# Patient Record
Sex: MALE | Race: WHITE | NOT HISPANIC OR LATINO | Employment: UNEMPLOYED | URBAN - METROPOLITAN AREA
[De-identification: names, ages, dates, MRNs, and addresses within clinical notes are randomized per-mention and may not be internally consistent; named-entity substitution may affect disease eponyms.]

---

## 2018-11-09 ENCOUNTER — TRANSCRIBE ORDERS (OUTPATIENT)
Dept: LAB | Facility: HOSPITAL | Age: 43
End: 2018-11-09

## 2018-11-09 ENCOUNTER — APPOINTMENT (OUTPATIENT)
Dept: LAB | Facility: HOSPITAL | Age: 43
End: 2018-11-09
Payer: MEDICARE

## 2018-11-09 DIAGNOSIS — F63.81 INTERMITTENT EXPLOSIVE DISORDER: ICD-10-CM

## 2018-11-09 DIAGNOSIS — F41.1 GENERALIZED ANXIETY DISORDER: ICD-10-CM

## 2018-11-09 DIAGNOSIS — F70 MILD INTELLECTUAL DISABILITIES: ICD-10-CM

## 2018-11-09 DIAGNOSIS — E66.9 OBESITY, UNSPECIFIED CLASSIFICATION, UNSPECIFIED OBESITY TYPE, UNSPECIFIED WHETHER SERIOUS COMORBIDITY PRESENT: ICD-10-CM

## 2018-11-09 DIAGNOSIS — Z79.899 NEED FOR PROPHYLACTIC CHEMOTHERAPY: ICD-10-CM

## 2018-11-09 DIAGNOSIS — Z79.899 NEED FOR PROPHYLACTIC CHEMOTHERAPY: Primary | ICD-10-CM

## 2018-11-09 LAB
25(OH)D3 SERPL-MCNC: 18.3 NG/ML (ref 30–100)
ALBUMIN SERPL BCP-MCNC: 4 G/DL (ref 3.5–5)
ALP SERPL-CCNC: 96 U/L (ref 46–116)
ALT SERPL W P-5'-P-CCNC: 59 U/L (ref 12–78)
ANION GAP SERPL CALCULATED.3IONS-SCNC: 5 MMOL/L (ref 4–13)
AST SERPL W P-5'-P-CCNC: 35 U/L (ref 5–45)
BASOPHILS # BLD AUTO: 0.05 THOUSANDS/ΜL (ref 0–0.1)
BASOPHILS NFR BLD AUTO: 1 % (ref 0–1)
BILIRUB SERPL-MCNC: 0.47 MG/DL (ref 0.2–1)
BUN SERPL-MCNC: 15 MG/DL (ref 5–25)
CALCIUM SERPL-MCNC: 9.4 MG/DL (ref 8.3–10.1)
CHLORIDE SERPL-SCNC: 106 MMOL/L (ref 100–108)
CHOLEST SERPL-MCNC: 262 MG/DL (ref 50–200)
CO2 SERPL-SCNC: 27 MMOL/L (ref 21–32)
CREAT SERPL-MCNC: 1.11 MG/DL (ref 0.6–1.3)
EOSINOPHIL # BLD AUTO: 0.66 THOUSAND/ΜL (ref 0–0.61)
EOSINOPHIL NFR BLD AUTO: 11 % (ref 0–6)
ERYTHROCYTE [DISTWIDTH] IN BLOOD BY AUTOMATED COUNT: 12.7 % (ref 11.6–15.1)
EST. AVERAGE GLUCOSE BLD GHB EST-MCNC: 131 MG/DL
GFR SERPL CREATININE-BSD FRML MDRD: 81 ML/MIN/1.73SQ M
GLUCOSE P FAST SERPL-MCNC: 117 MG/DL (ref 65–99)
HBA1C MFR BLD: 6.2 % (ref 4.2–6.3)
HCT VFR BLD AUTO: 47 % (ref 36.5–49.3)
HDLC SERPL-MCNC: 31 MG/DL (ref 40–60)
HGB BLD-MCNC: 15.5 G/DL (ref 12–17)
IMM GRANULOCYTES # BLD AUTO: 0.01 THOUSAND/UL (ref 0–0.2)
IMM GRANULOCYTES NFR BLD AUTO: 0 % (ref 0–2)
LDLC SERPL CALC-MCNC: 191 MG/DL (ref 0–100)
LYMPHOCYTES # BLD AUTO: 1.32 THOUSANDS/ΜL (ref 0.6–4.47)
LYMPHOCYTES NFR BLD AUTO: 21 % (ref 14–44)
MCH RBC QN AUTO: 29.6 PG (ref 26.8–34.3)
MCHC RBC AUTO-ENTMCNC: 33 G/DL (ref 31.4–37.4)
MCV RBC AUTO: 90 FL (ref 82–98)
MONOCYTES # BLD AUTO: 0.5 THOUSAND/ΜL (ref 0.17–1.22)
MONOCYTES NFR BLD AUTO: 8 % (ref 4–12)
NEUTROPHILS # BLD AUTO: 3.74 THOUSANDS/ΜL (ref 1.85–7.62)
NEUTS SEG NFR BLD AUTO: 59 % (ref 43–75)
NONHDLC SERPL-MCNC: 231 MG/DL
NRBC BLD AUTO-RTO: 0 /100 WBCS
PLATELET # BLD AUTO: 178 THOUSANDS/UL (ref 149–390)
PMV BLD AUTO: 11 FL (ref 8.9–12.7)
POTASSIUM SERPL-SCNC: 4.6 MMOL/L (ref 3.5–5.3)
PROT SERPL-MCNC: 7.8 G/DL (ref 6.4–8.2)
RBC # BLD AUTO: 5.24 MILLION/UL (ref 3.88–5.62)
SODIUM SERPL-SCNC: 138 MMOL/L (ref 136–145)
TRIGL SERPL-MCNC: 201 MG/DL
TSH SERPL DL<=0.05 MIU/L-ACNC: 2.95 UIU/ML (ref 0.36–3.74)
WBC # BLD AUTO: 6.28 THOUSAND/UL (ref 4.31–10.16)

## 2018-11-09 PROCEDURE — 80307 DRUG TEST PRSMV CHEM ANLYZR: CPT

## 2018-11-09 PROCEDURE — 83036 HEMOGLOBIN GLYCOSYLATED A1C: CPT

## 2018-11-09 PROCEDURE — 80053 COMPREHEN METABOLIC PANEL: CPT

## 2018-11-09 PROCEDURE — 80061 LIPID PANEL: CPT

## 2018-11-09 PROCEDURE — 85025 COMPLETE CBC W/AUTO DIFF WBC: CPT

## 2018-11-09 PROCEDURE — 82306 VITAMIN D 25 HYDROXY: CPT

## 2018-11-09 PROCEDURE — 36415 COLL VENOUS BLD VENIPUNCTURE: CPT

## 2018-11-09 PROCEDURE — 84443 ASSAY THYROID STIM HORMONE: CPT

## 2018-11-12 LAB
AMPHETAMINES UR QL SCN: NEGATIVE NG/ML
BARBITURATES UR QL SCN: NEGATIVE NG/ML
BENZODIAZ UR QL SCN: NEGATIVE NG/ML
BZE UR QL: NEGATIVE NG/ML
CANNABINOIDS UR QL SCN: NEGATIVE NG/ML
METHADONE UR QL SCN: NEGATIVE NG/ML
OPIATES UR QL: NEGATIVE NG/ML
PCP UR QL: NEGATIVE NG/ML
PROPOXYPH UR QL: NEGATIVE NG/ML

## 2019-12-18 ENCOUNTER — TRANSCRIBE ORDERS (OUTPATIENT)
Dept: LAB | Facility: HOSPITAL | Age: 44
End: 2019-12-18

## 2019-12-18 ENCOUNTER — LAB (OUTPATIENT)
Dept: LAB | Facility: HOSPITAL | Age: 44
End: 2019-12-18
Payer: MEDICARE

## 2019-12-18 DIAGNOSIS — E78.5 HYPERLIPIDEMIA, UNSPECIFIED HYPERLIPIDEMIA TYPE: ICD-10-CM

## 2019-12-18 DIAGNOSIS — Z79.899 ENCOUNTER FOR LONG-TERM (CURRENT) USE OF OTHER MEDICATIONS: ICD-10-CM

## 2019-12-18 DIAGNOSIS — R73.09 IMPAIRED GLUCOSE TOLERANCE TEST: ICD-10-CM

## 2019-12-18 DIAGNOSIS — E55.9 AVITAMINOSIS D: ICD-10-CM

## 2019-12-18 DIAGNOSIS — E66.9 OBESITY, UNSPECIFIED CLASSIFICATION, UNSPECIFIED OBESITY TYPE, UNSPECIFIED WHETHER SERIOUS COMORBIDITY PRESENT: ICD-10-CM

## 2019-12-18 DIAGNOSIS — E55.9 AVITAMINOSIS D: Primary | ICD-10-CM

## 2019-12-18 LAB
25(OH)D3 SERPL-MCNC: 21.3 NG/ML (ref 30–100)
ALBUMIN SERPL BCP-MCNC: 3.9 G/DL (ref 3.5–5)
ALP SERPL-CCNC: 85 U/L (ref 46–116)
ALT SERPL W P-5'-P-CCNC: 74 U/L (ref 12–78)
ANION GAP SERPL CALCULATED.3IONS-SCNC: 5 MMOL/L (ref 4–13)
AST SERPL W P-5'-P-CCNC: 42 U/L (ref 5–45)
BASOPHILS # BLD AUTO: 0.06 THOUSANDS/ΜL (ref 0–0.1)
BASOPHILS NFR BLD AUTO: 1 % (ref 0–1)
BILIRUB SERPL-MCNC: 0.56 MG/DL (ref 0.2–1)
BUN SERPL-MCNC: 16 MG/DL (ref 5–25)
CALCIUM SERPL-MCNC: 9.4 MG/DL (ref 8.3–10.1)
CHLORIDE SERPL-SCNC: 109 MMOL/L (ref 100–108)
CHOLEST SERPL-MCNC: 246 MG/DL (ref 50–200)
CO2 SERPL-SCNC: 27 MMOL/L (ref 21–32)
CREAT SERPL-MCNC: 1.08 MG/DL (ref 0.6–1.3)
EOSINOPHIL # BLD AUTO: 0.44 THOUSAND/ΜL (ref 0–0.61)
EOSINOPHIL NFR BLD AUTO: 6 % (ref 0–6)
ERYTHROCYTE [DISTWIDTH] IN BLOOD BY AUTOMATED COUNT: 12.9 % (ref 11.6–15.1)
EST. AVERAGE GLUCOSE BLD GHB EST-MCNC: 126 MG/DL
GFR SERPL CREATININE-BSD FRML MDRD: 84 ML/MIN/1.73SQ M
GLUCOSE P FAST SERPL-MCNC: 119 MG/DL (ref 65–99)
HBA1C MFR BLD: 6 % (ref 4.2–6.3)
HCT VFR BLD AUTO: 49.5 % (ref 36.5–49.3)
HDLC SERPL-MCNC: 33 MG/DL
HGB BLD-MCNC: 16.2 G/DL (ref 12–17)
IMM GRANULOCYTES # BLD AUTO: 0.03 THOUSAND/UL (ref 0–0.2)
IMM GRANULOCYTES NFR BLD AUTO: 0 % (ref 0–2)
LDLC SERPL CALC-MCNC: 172 MG/DL (ref 0–100)
LYMPHOCYTES # BLD AUTO: 1.35 THOUSANDS/ΜL (ref 0.6–4.47)
LYMPHOCYTES NFR BLD AUTO: 18 % (ref 14–44)
MCH RBC QN AUTO: 29.5 PG (ref 26.8–34.3)
MCHC RBC AUTO-ENTMCNC: 32.7 G/DL (ref 31.4–37.4)
MCV RBC AUTO: 90 FL (ref 82–98)
MONOCYTES # BLD AUTO: 0.67 THOUSAND/ΜL (ref 0.17–1.22)
MONOCYTES NFR BLD AUTO: 9 % (ref 4–12)
NEUTROPHILS # BLD AUTO: 4.86 THOUSANDS/ΜL (ref 1.85–7.62)
NEUTS SEG NFR BLD AUTO: 66 % (ref 43–75)
NONHDLC SERPL-MCNC: 213 MG/DL
NRBC BLD AUTO-RTO: 0 /100 WBCS
PLATELET # BLD AUTO: 198 THOUSANDS/UL (ref 149–390)
PMV BLD AUTO: 11 FL (ref 8.9–12.7)
POTASSIUM SERPL-SCNC: 4.4 MMOL/L (ref 3.5–5.3)
PROT SERPL-MCNC: 7.7 G/DL (ref 6.4–8.2)
RBC # BLD AUTO: 5.5 MILLION/UL (ref 3.88–5.62)
SODIUM SERPL-SCNC: 141 MMOL/L (ref 136–145)
TRIGL SERPL-MCNC: 207 MG/DL
TSH SERPL DL<=0.05 MIU/L-ACNC: 3.96 UIU/ML (ref 0.36–3.74)
WBC # BLD AUTO: 7.41 THOUSAND/UL (ref 4.31–10.16)

## 2019-12-18 PROCEDURE — 85025 COMPLETE CBC W/AUTO DIFF WBC: CPT

## 2019-12-18 PROCEDURE — 36415 COLL VENOUS BLD VENIPUNCTURE: CPT

## 2019-12-18 PROCEDURE — 84443 ASSAY THYROID STIM HORMONE: CPT

## 2019-12-18 PROCEDURE — 80053 COMPREHEN METABOLIC PANEL: CPT

## 2019-12-18 PROCEDURE — 80061 LIPID PANEL: CPT

## 2019-12-18 PROCEDURE — 83036 HEMOGLOBIN GLYCOSYLATED A1C: CPT

## 2019-12-18 PROCEDURE — 82306 VITAMIN D 25 HYDROXY: CPT

## 2022-02-24 ENCOUNTER — HOSPITAL ENCOUNTER (INPATIENT)
Facility: HOSPITAL | Age: 47
LOS: 1 days | Discharge: HOME | DRG: 309 | End: 2022-02-25
Attending: EMERGENCY MEDICINE | Admitting: INTERNAL MEDICINE
Payer: MEDICARE

## 2022-02-24 ENCOUNTER — APPOINTMENT (EMERGENCY)
Dept: RADIOLOGY | Facility: HOSPITAL | Age: 47
DRG: 309 | End: 2022-02-24
Payer: MEDICARE

## 2022-02-24 DIAGNOSIS — I48.91 A-FIB (HCC): Primary | ICD-10-CM

## 2022-02-24 DIAGNOSIS — J45.909 ASTHMA: ICD-10-CM

## 2022-02-24 DIAGNOSIS — E11.9 TYPE 2 DIABETES MELLITUS, WITHOUT LONG-TERM CURRENT USE OF INSULIN (HCC): ICD-10-CM

## 2022-02-24 LAB
2HR DELTA HS TROPONIN: 0 NG/L
ALBUMIN SERPL BCP-MCNC: 4 G/DL (ref 3.5–5)
ALP SERPL-CCNC: 108 U/L (ref 46–116)
ALT SERPL W P-5'-P-CCNC: 29 U/L (ref 12–78)
ANION GAP SERPL CALCULATED.3IONS-SCNC: 5 MMOL/L (ref 4–13)
AST SERPL W P-5'-P-CCNC: 49 U/L (ref 5–45)
BASOPHILS # BLD AUTO: 0.04 THOUSANDS/ΜL (ref 0–0.1)
BASOPHILS NFR BLD AUTO: 0 % (ref 0–1)
BILIRUB SERPL-MCNC: 1.13 MG/DL (ref 0.2–1)
BUN SERPL-MCNC: 22 MG/DL (ref 5–25)
CALCIUM SERPL-MCNC: 9.3 MG/DL (ref 8.3–10.1)
CARDIAC TROPONIN I PNL SERPL HS: 5 NG/L
CARDIAC TROPONIN I PNL SERPL HS: 5 NG/L
CHLORIDE SERPL-SCNC: 107 MMOL/L (ref 100–108)
CO2 SERPL-SCNC: 27 MMOL/L (ref 21–32)
CREAT SERPL-MCNC: 1.2 MG/DL (ref 0.6–1.3)
D DIMER PPP FEU-MCNC: 2.36 UG/ML FEU
EOSINOPHIL # BLD AUTO: 0.21 THOUSAND/ΜL (ref 0–0.61)
EOSINOPHIL NFR BLD AUTO: 2 % (ref 0–6)
ERYTHROCYTE [DISTWIDTH] IN BLOOD BY AUTOMATED COUNT: 15.5 % (ref 11.6–15.1)
GFR SERPL CREATININE-BSD FRML MDRD: 72 ML/MIN/1.73SQ M
GLUCOSE SERPL-MCNC: 124 MG/DL (ref 65–140)
HCT VFR BLD AUTO: 45.4 % (ref 36.5–49.3)
HGB BLD-MCNC: 14 G/DL (ref 12–17)
IMM GRANULOCYTES # BLD AUTO: 0.03 THOUSAND/UL (ref 0–0.2)
IMM GRANULOCYTES NFR BLD AUTO: 0 % (ref 0–2)
LYMPHOCYTES # BLD AUTO: 1.22 THOUSANDS/ΜL (ref 0.6–4.47)
LYMPHOCYTES NFR BLD AUTO: 14 % (ref 14–44)
MCH RBC QN AUTO: 27.2 PG (ref 26.8–34.3)
MCHC RBC AUTO-ENTMCNC: 30.8 G/DL (ref 31.4–37.4)
MCV RBC AUTO: 88 FL (ref 82–98)
MONOCYTES # BLD AUTO: 0.69 THOUSAND/ΜL (ref 0.17–1.22)
MONOCYTES NFR BLD AUTO: 8 % (ref 4–12)
NEUTROPHILS # BLD AUTO: 6.81 THOUSANDS/ΜL (ref 1.85–7.62)
NEUTS SEG NFR BLD AUTO: 76 % (ref 43–75)
NRBC BLD AUTO-RTO: 0 /100 WBCS
PLATELET # BLD AUTO: 217 THOUSANDS/UL (ref 149–390)
PMV BLD AUTO: 11.7 FL (ref 8.9–12.7)
POTASSIUM SERPL-SCNC: 4.6 MMOL/L (ref 3.5–5.3)
PROT SERPL-MCNC: 8.4 G/DL (ref 6.4–8.2)
RBC # BLD AUTO: 5.15 MILLION/UL (ref 3.88–5.62)
SODIUM SERPL-SCNC: 139 MMOL/L (ref 136–145)
WBC # BLD AUTO: 9 THOUSAND/UL (ref 4.31–10.16)

## 2022-02-24 PROCEDURE — 85025 COMPLETE CBC W/AUTO DIFF WBC: CPT

## 2022-02-24 PROCEDURE — 71275 CT ANGIOGRAPHY CHEST: CPT

## 2022-02-24 PROCEDURE — 36415 COLL VENOUS BLD VENIPUNCTURE: CPT

## 2022-02-24 PROCEDURE — 99291 CRITICAL CARE FIRST HOUR: CPT | Performed by: EMERGENCY MEDICINE

## 2022-02-24 PROCEDURE — 85379 FIBRIN DEGRADATION QUANT: CPT

## 2022-02-24 PROCEDURE — 74177 CT ABD & PELVIS W/CONTRAST: CPT

## 2022-02-24 PROCEDURE — 93005 ELECTROCARDIOGRAM TRACING: CPT

## 2022-02-24 PROCEDURE — 83036 HEMOGLOBIN GLYCOSYLATED A1C: CPT

## 2022-02-24 PROCEDURE — 99285 EMERGENCY DEPT VISIT HI MDM: CPT

## 2022-02-24 PROCEDURE — 80053 COMPREHEN METABOLIC PANEL: CPT

## 2022-02-24 PROCEDURE — 96376 TX/PRO/DX INJ SAME DRUG ADON: CPT

## 2022-02-24 PROCEDURE — 96374 THER/PROPH/DIAG INJ IV PUSH: CPT

## 2022-02-24 PROCEDURE — G1004 CDSM NDSC: HCPCS

## 2022-02-24 PROCEDURE — 84484 ASSAY OF TROPONIN QUANT: CPT

## 2022-02-24 RX ORDER — METOPROLOL TARTRATE 5 MG/5ML
10 INJECTION INTRAVENOUS ONCE
Status: COMPLETED | OUTPATIENT
Start: 2022-02-24 | End: 2022-02-24

## 2022-02-24 RX ORDER — METOPROLOL TARTRATE 5 MG/5ML
5 INJECTION INTRAVENOUS ONCE
Status: COMPLETED | OUTPATIENT
Start: 2022-02-24 | End: 2022-02-24

## 2022-02-24 RX ORDER — METOPROLOL TARTRATE 5 MG/5ML
5 INJECTION INTRAVENOUS ONCE
Status: CANCELLED | OUTPATIENT
Start: 2022-02-24 | End: 2022-02-24

## 2022-02-24 RX ADMIN — METOROPROLOL TARTRATE 10 MG: 5 INJECTION, SOLUTION INTRAVENOUS at 22:49

## 2022-02-24 RX ADMIN — METOROPROLOL TARTRATE 5 MG: 5 INJECTION, SOLUTION INTRAVENOUS at 20:37

## 2022-02-24 RX ADMIN — METOROPROLOL TARTRATE 5 MG: 5 INJECTION, SOLUTION INTRAVENOUS at 21:22

## 2022-02-24 RX ADMIN — IOHEXOL 100 ML: 350 INJECTION, SOLUTION INTRAVENOUS at 22:25

## 2022-02-25 ENCOUNTER — APPOINTMENT (OUTPATIENT)
Dept: NON INVASIVE DIAGNOSTICS | Facility: HOSPITAL | Age: 47
DRG: 309 | End: 2022-02-25
Payer: MEDICARE

## 2022-02-25 ENCOUNTER — EPISODE CHANGES (OUTPATIENT)
Dept: CASE MANAGEMENT | Facility: OTHER | Age: 47
End: 2022-02-25

## 2022-02-25 VITALS
TEMPERATURE: 97.8 F | OXYGEN SATURATION: 92 % | HEART RATE: 102 BPM | BODY MASS INDEX: 36.51 KG/M2 | HEIGHT: 70 IN | SYSTOLIC BLOOD PRESSURE: 127 MMHG | RESPIRATION RATE: 22 BRPM | DIASTOLIC BLOOD PRESSURE: 76 MMHG | WEIGHT: 255 LBS

## 2022-02-25 PROBLEM — R41.89 COGNITIVE DEFICITS: Status: ACTIVE | Noted: 2022-02-25

## 2022-02-25 PROBLEM — K57.30 DIVERTICULOSIS OF COLON WITHOUT DIVERTICULITIS: Status: ACTIVE | Noted: 2022-02-25

## 2022-02-25 PROBLEM — I48.91 ATRIAL FIBRILLATION WITH RAPID VENTRICULAR RESPONSE (HCC): Status: ACTIVE | Noted: 2022-02-25

## 2022-02-25 PROBLEM — R17 TOTAL BILIRUBIN, ELEVATED: Status: ACTIVE | Noted: 2022-02-25

## 2022-02-25 PROBLEM — R06.00 DYSPNEA ON MINIMAL EXERTION: Status: ACTIVE | Noted: 2022-02-25

## 2022-02-25 PROBLEM — R79.89 ELEVATED D-DIMER: Status: ACTIVE | Noted: 2022-02-25

## 2022-02-25 PROBLEM — I10 HYPERTENSION: Status: ACTIVE | Noted: 2022-02-25

## 2022-02-25 PROBLEM — J90 BILATERAL PLEURAL EFFUSION: Status: ACTIVE | Noted: 2022-02-25

## 2022-02-25 PROBLEM — K44.9 HIATAL HERNIA: Status: ACTIVE | Noted: 2022-02-25

## 2022-02-25 PROBLEM — E66.9 OBESITY: Status: ACTIVE | Noted: 2022-02-25

## 2022-02-25 PROBLEM — E11.9 TYPE 2 DIABETES MELLITUS, WITHOUT LONG-TERM CURRENT USE OF INSULIN (HCC): Status: ACTIVE | Noted: 2022-02-25

## 2022-02-25 PROBLEM — R74.8 ELEVATED LIVER ENZYMES: Status: ACTIVE | Noted: 2022-02-25

## 2022-02-25 PROBLEM — J45.909 ASTHMA: Status: ACTIVE | Noted: 2022-02-25

## 2022-02-25 PROBLEM — R06.09 DYSPNEA ON MINIMAL EXERTION: Status: ACTIVE | Noted: 2022-02-25

## 2022-02-25 PROBLEM — I51.7 CARDIOMEGALY: Status: ACTIVE | Noted: 2022-02-25

## 2022-02-25 PROBLEM — T79.2XXA SEROMA DUE TO TRAUMA (HCC): Status: ACTIVE | Noted: 2022-02-25

## 2022-02-25 LAB
ALBUMIN SERPL BCP-MCNC: 4 G/DL (ref 3.5–5)
ALP SERPL-CCNC: 101 U/L (ref 46–116)
ALT SERPL W P-5'-P-CCNC: 24 U/L (ref 12–78)
ANION GAP SERPL CALCULATED.3IONS-SCNC: 7 MMOL/L (ref 4–13)
AORTIC ROOT: 3.5 CM
APICAL FOUR CHAMBER EJECTION FRACTION: 57 %
APTT PPP: 30 SECONDS (ref 23–37)
APTT PPP: 31 SECONDS (ref 23–37)
ASCENDING AORTA: 3.1 CM (ref 2.23–3.35)
AST SERPL W P-5'-P-CCNC: 24 U/L (ref 5–45)
ATRIAL RATE: 441 BPM
BASOPHILS # BLD AUTO: 0.05 THOUSANDS/ΜL (ref 0–0.1)
BASOPHILS NFR BLD AUTO: 1 % (ref 0–1)
BILIRUB DIRECT SERPL-MCNC: 0.33 MG/DL (ref 0–0.2)
BILIRUB SERPL-MCNC: 1.33 MG/DL (ref 0.2–1)
BUN SERPL-MCNC: 19 MG/DL (ref 5–25)
CALCIUM SERPL-MCNC: 9.1 MG/DL (ref 8.3–10.1)
CHLORIDE SERPL-SCNC: 108 MMOL/L (ref 100–108)
CHOLEST SERPL-MCNC: 169 MG/DL
CHOLEST SERPL-MCNC: 175 MG/DL
CO2 SERPL-SCNC: 23 MMOL/L (ref 21–32)
CREAT SERPL-MCNC: 1.17 MG/DL (ref 0.6–1.3)
EOSINOPHIL # BLD AUTO: 0.24 THOUSAND/ΜL (ref 0–0.61)
EOSINOPHIL NFR BLD AUTO: 2 % (ref 0–6)
ERYTHROCYTE [DISTWIDTH] IN BLOOD BY AUTOMATED COUNT: 15.6 % (ref 11.6–15.1)
EST. AVERAGE GLUCOSE BLD GHB EST-MCNC: 143 MG/DL
EST. AVERAGE GLUCOSE BLD GHB EST-MCNC: 146 MG/DL
FLUAV RNA RESP QL NAA+PROBE: NEGATIVE
FLUBV RNA RESP QL NAA+PROBE: NEGATIVE
FRACTIONAL SHORTENING: 33 % (ref 28–44)
GFR SERPL CREATININE-BSD FRML MDRD: 74 ML/MIN/1.73SQ M
GLUCOSE SERPL-MCNC: 147 MG/DL (ref 65–140)
HBA1C MFR BLD: 6.6 %
HBA1C MFR BLD: 6.7 %
HCT VFR BLD AUTO: 44.6 % (ref 36.5–49.3)
HDLC SERPL-MCNC: 21 MG/DL
HDLC SERPL-MCNC: 23 MG/DL
HGB BLD-MCNC: 13.8 G/DL (ref 12–17)
IMM GRANULOCYTES # BLD AUTO: 0.04 THOUSAND/UL (ref 0–0.2)
IMM GRANULOCYTES NFR BLD AUTO: 0 % (ref 0–2)
INR PPP: 1.14 (ref 0.84–1.19)
INTERVENTRICULAR SEPTUM IN DIASTOLE (PARASTERNAL SHORT AXIS VIEW): 0.9 CM (ref 0.58–1.08)
INTERVENTRICULAR SEPTUM: 0.9 CM (ref 0.6–1.1)
LDLC SERPL CALC-MCNC: 123 MG/DL (ref 0–100)
LDLC SERPL CALC-MCNC: 137 MG/DL (ref 0–100)
LEFT ATRIUM SIZE: 3 CM
LEFT INTERNAL DIMENSION IN SYSTOLE: 2.8 CM (ref 5.68–8.61)
LEFT VENTRICULAR INTERNAL DIMENSION IN DIASTOLE: 4.2 CM (ref 9.59–14.3)
LEFT VENTRICULAR POSTERIOR WALL IN END DIASTOLE: 0.9 CM (ref 0.56–1.06)
LEFT VENTRICULAR STROKE VOLUME: 51 ML
LVSV (TEICH): 51 ML
LYMPHOCYTES # BLD AUTO: 1.82 THOUSANDS/ΜL (ref 0.6–4.47)
LYMPHOCYTES NFR BLD AUTO: 18 % (ref 14–44)
MAGNESIUM SERPL-MCNC: 2.3 MG/DL (ref 1.6–2.6)
MCH RBC QN AUTO: 26.8 PG (ref 26.8–34.3)
MCHC RBC AUTO-ENTMCNC: 30.9 G/DL (ref 31.4–37.4)
MCV RBC AUTO: 87 FL (ref 82–98)
MONOCYTES # BLD AUTO: 1.14 THOUSAND/ΜL (ref 0.17–1.22)
MONOCYTES NFR BLD AUTO: 11 % (ref 4–12)
NEUTROPHILS # BLD AUTO: 6.88 THOUSANDS/ΜL (ref 1.85–7.62)
NEUTS SEG NFR BLD AUTO: 68 % (ref 43–75)
NONHDLC SERPL-MCNC: 146 MG/DL
NRBC BLD AUTO-RTO: 0 /100 WBCS
NT-PROBNP SERPL-MCNC: 957 PG/ML
PHOSPHATE SERPL-MCNC: 3.3 MG/DL (ref 2.7–4.5)
PLATELET # BLD AUTO: 207 THOUSANDS/UL (ref 149–390)
PMV BLD AUTO: 11.5 FL (ref 8.9–12.7)
POTASSIUM SERPL-SCNC: 4.5 MMOL/L (ref 3.5–5.3)
PROT SERPL-MCNC: 8.3 G/DL (ref 6.4–8.2)
PROTHROMBIN TIME: 14.2 SECONDS (ref 11.6–14.5)
QRS AXIS: 70 DEGREES
QRSD INTERVAL: 90 MS
QT INTERVAL: 260 MS
QTC INTERVAL: 401 MS
RBC # BLD AUTO: 5.15 MILLION/UL (ref 3.88–5.62)
RSV RNA RESP QL NAA+PROBE: NEGATIVE
SARS-COV-2 RNA RESP QL NAA+PROBE: NEGATIVE
SL CV LV EF: 60
SL CV PED ECHO LEFT VENTRICLE DIASTOLIC VOLUME (MOD BIPLANE) 2D: 79 ML
SL CV PED ECHO LEFT VENTRICLE SYSTOLIC VOLUME (MOD BIPLANE) 2D: 28 ML
SODIUM SERPL-SCNC: 138 MMOL/L (ref 136–145)
T WAVE AXIS: -39 DEGREES
TRIGL SERPL-MCNC: 113 MG/DL
TRIGL SERPL-MCNC: 87 MG/DL
TSH SERPL DL<=0.05 MIU/L-ACNC: 2.8 UIU/ML (ref 0.36–3.74)
VENTRICULAR RATE: 143 BPM
WBC # BLD AUTO: 10.17 THOUSAND/UL (ref 4.31–10.16)
Z-SCORE OF ASCENDING AORTA: 1.1
Z-SCORE OF INTERVENTRICULAR SEPTUM IN END DIASTOLE: 0.57
Z-SCORE OF LEFT VENTRICULAR DIMENSION IN END DIASTOLE: -10.25
Z-SCORE OF LEFT VENTRICULAR DIMENSION IN END SYSTOLE: -7.24
Z-SCORE OF LEFT VENTRICULAR POSTERIOR WALL IN END DIASTOLE: 0.68

## 2022-02-25 PROCEDURE — 80061 LIPID PANEL: CPT

## 2022-02-25 PROCEDURE — 84100 ASSAY OF PHOSPHORUS: CPT

## 2022-02-25 PROCEDURE — 99448 NTRPROF PH1/NTRNET/EHR 21-30: CPT | Performed by: PHYSICIAN ASSISTANT

## 2022-02-25 PROCEDURE — 85730 THROMBOPLASTIN TIME PARTIAL: CPT | Performed by: STUDENT IN AN ORGANIZED HEALTH CARE EDUCATION/TRAINING PROGRAM

## 2022-02-25 PROCEDURE — 80061 LIPID PANEL: CPT | Performed by: INTERNAL MEDICINE

## 2022-02-25 PROCEDURE — 85025 COMPLETE CBC W/AUTO DIFF WBC: CPT

## 2022-02-25 PROCEDURE — 83735 ASSAY OF MAGNESIUM: CPT

## 2022-02-25 PROCEDURE — 0241U HB NFCT DS VIR RESP RNA 4 TRGT: CPT

## 2022-02-25 PROCEDURE — NC001 PR NO CHARGE: Performed by: INTERNAL MEDICINE

## 2022-02-25 PROCEDURE — 99219 PR INITIAL OBSERVATION CARE/DAY 50 MINUTES: CPT | Performed by: INTERNAL MEDICINE

## 2022-02-25 PROCEDURE — 36415 COLL VENOUS BLD VENIPUNCTURE: CPT

## 2022-02-25 PROCEDURE — 84443 ASSAY THYROID STIM HORMONE: CPT

## 2022-02-25 PROCEDURE — C8929 TTE W OR WO FOL WCON,DOPPLER: HCPCS

## 2022-02-25 PROCEDURE — 99284 EMERGENCY DEPT VISIT MOD MDM: CPT | Performed by: INTERNAL MEDICINE

## 2022-02-25 PROCEDURE — 85610 PROTHROMBIN TIME: CPT | Performed by: STUDENT IN AN ORGANIZED HEALTH CARE EDUCATION/TRAINING PROGRAM

## 2022-02-25 PROCEDURE — 93306 TTE W/DOPPLER COMPLETE: CPT | Performed by: INTERNAL MEDICINE

## 2022-02-25 PROCEDURE — 80053 COMPREHEN METABOLIC PANEL: CPT | Performed by: STUDENT IN AN ORGANIZED HEALTH CARE EDUCATION/TRAINING PROGRAM

## 2022-02-25 PROCEDURE — 83880 ASSAY OF NATRIURETIC PEPTIDE: CPT

## 2022-02-25 PROCEDURE — 82248 BILIRUBIN DIRECT: CPT | Performed by: STUDENT IN AN ORGANIZED HEALTH CARE EDUCATION/TRAINING PROGRAM

## 2022-02-25 PROCEDURE — 93010 ELECTROCARDIOGRAM REPORT: CPT | Performed by: INTERNAL MEDICINE

## 2022-02-25 PROCEDURE — 83036 HEMOGLOBIN GLYCOSYLATED A1C: CPT | Performed by: INTERNAL MEDICINE

## 2022-02-25 RX ORDER — DILTIAZEM HYDROCHLORIDE 5 MG/ML
10 INJECTION INTRAVENOUS ONCE
Status: COMPLETED | OUTPATIENT
Start: 2022-02-25 | End: 2022-02-25

## 2022-02-25 RX ORDER — FUROSEMIDE 10 MG/ML
20 INJECTION INTRAMUSCULAR; INTRAVENOUS ONCE
Status: DISCONTINUED | OUTPATIENT
Start: 2022-02-25 | End: 2022-02-25

## 2022-02-25 RX ORDER — METOPROLOL TARTRATE 5 MG/5ML
5 INJECTION INTRAVENOUS EVERY 6 HOURS PRN
Status: DISCONTINUED | OUTPATIENT
Start: 2022-02-25 | End: 2022-02-25 | Stop reason: HOSPADM

## 2022-02-25 RX ORDER — ROSUVASTATIN CALCIUM 10 MG/1
10 TABLET, COATED ORAL DAILY
Qty: 30 TABLET | Refills: 0 | Status: SHIPPED | OUTPATIENT
Start: 2022-02-25 | End: 2022-07-13

## 2022-02-25 RX ORDER — ALBUTEROL SULFATE 1.25 MG/3ML
1.25 SOLUTION RESPIRATORY (INHALATION) EVERY 6 HOURS PRN
COMMUNITY
End: 2022-02-25 | Stop reason: CLARIF

## 2022-02-25 RX ORDER — ACETAMINOPHEN 325 MG/1
650 TABLET ORAL EVERY 6 HOURS PRN
Status: DISCONTINUED | OUTPATIENT
Start: 2022-02-25 | End: 2022-02-25 | Stop reason: HOSPADM

## 2022-02-25 RX ORDER — DILTIAZEM HYDROCHLORIDE 180 MG/1
180 CAPSULE, COATED, EXTENDED RELEASE ORAL DAILY
Status: DISCONTINUED | OUTPATIENT
Start: 2022-02-25 | End: 2022-02-25

## 2022-02-25 RX ORDER — ALBUTEROL SULFATE 90 UG/1
2 AEROSOL, METERED RESPIRATORY (INHALATION) EVERY 4 HOURS PRN
Qty: 8 G | Refills: 0 | Status: SHIPPED | OUTPATIENT
Start: 2022-02-25 | End: 2022-06-15 | Stop reason: SDUPTHER

## 2022-02-25 RX ORDER — METFORMIN HYDROCHLORIDE 500 MG/1
500 TABLET, EXTENDED RELEASE ORAL
Qty: 30 TABLET | Refills: 0 | Status: SHIPPED | OUTPATIENT
Start: 2022-02-25 | End: 2022-06-15 | Stop reason: SDUPTHER

## 2022-02-25 RX ORDER — MAGNESIUM SULFATE HEPTAHYDRATE 40 MG/ML
2 INJECTION, SOLUTION INTRAVENOUS ONCE
Status: COMPLETED | OUTPATIENT
Start: 2022-02-25 | End: 2022-02-25

## 2022-02-25 RX ORDER — ALBUTEROL SULFATE 90 UG/1
2 AEROSOL, METERED RESPIRATORY (INHALATION) EVERY 4 HOURS PRN
Status: DISCONTINUED | OUTPATIENT
Start: 2022-02-25 | End: 2022-02-25 | Stop reason: HOSPADM

## 2022-02-25 RX ORDER — FUROSEMIDE 10 MG/ML
20 INJECTION INTRAMUSCULAR; INTRAVENOUS ONCE
Status: COMPLETED | OUTPATIENT
Start: 2022-02-25 | End: 2022-02-25

## 2022-02-25 RX ORDER — METOPROLOL TARTRATE 50 MG/1
50 TABLET, FILM COATED ORAL EVERY 12 HOURS SCHEDULED
Qty: 60 TABLET | Refills: 0 | Status: SHIPPED | OUTPATIENT
Start: 2022-02-25 | End: 2022-05-18

## 2022-02-25 RX ADMIN — DILTIAZEM HYDROCHLORIDE 10 MG: 5 INJECTION INTRAVENOUS at 01:19

## 2022-02-25 RX ADMIN — METOPROLOL TARTRATE 25 MG: 25 TABLET, FILM COATED ORAL at 13:54

## 2022-02-25 RX ADMIN — DILTIAZEM HYDROCHLORIDE 12.5 MG/HR: 5 INJECTION INTRAVENOUS at 02:25

## 2022-02-25 RX ADMIN — PERFLUTREN 1.2 ML/MIN: 6.52 INJECTION, SUSPENSION INTRAVENOUS at 09:30

## 2022-02-25 RX ADMIN — DILTIAZEM HYDROCHLORIDE 2.5 MG/HR: 5 INJECTION INTRAVENOUS at 01:25

## 2022-02-25 RX ADMIN — FUROSEMIDE 20 MG: 10 INJECTION, SOLUTION INTRAMUSCULAR; INTRAVENOUS at 13:54

## 2022-02-25 RX ADMIN — HEPARIN SODIUM 11.1 UNITS/KG/HR: 10000 INJECTION, SOLUTION INTRAVENOUS; SUBCUTANEOUS at 03:42

## 2022-02-25 RX ADMIN — MAGNESIUM SULFATE HEPTAHYDRATE 2 G: 2 INJECTION, SOLUTION INTRAVENOUS at 01:30

## 2022-02-25 NOTE — ED ATTENDING ATTESTATION
2/24/2022  Juan Bryson DO, saw and evaluated the patient  I have discussed the patient with the resident/non-physician practitioner and agree with the resident's/non-physician practitioner's findings, Plan of Care, and MDM as documented in the resident's/non-physician practitioner's note, except where noted  All available labs and Radiology studies were reviewed  I was present for key portions of any procedure(s) performed by the resident/non-physician practitioner and I was immediately available to provide assistance  At this point I agree with the current assessment done in the Emergency Department  I have conducted an independent evaluation of this patient a history and physical is as follows:    57 yo male presents for evaluation of dyspnea with exertion for some time now  No other a/e factors  Denies chest pain, leg pain or swelling, back pain, abd pain, focal weakness/numbness/tingling  No other c/o at this time  Imp: dyspnea, ddx broad plan: cardiac eval, reassess         ED Course         Critical Care Time  CriticalCare Time  Performed by: Carole Juarez DO  Authorized by: Carole Juarez DO     Critical care provider statement:     Critical care time (minutes):  32    Critical care time was exclusive of:  Separately billable procedures and treating other patients and teaching time    Critical care was necessary to treat or prevent imminent or life-threatening deterioration of the following conditions:  Cardiac failure    Critical care was time spent personally by me on the following activities:  Blood draw for specimens, obtaining history from patient or surrogate, development of treatment plan with patient or surrogate, evaluation of patient's response to treatment, examination of patient, re-evaluation of patient's condition, ordering and review of radiographic studies, ordering and review of laboratory studies and ordering and performing treatments and interventions

## 2022-02-25 NOTE — ASSESSMENT & PLAN NOTE
Patient reports history of asthma   Utilizes albuterol inhaler as needed at home    Plan:  · Continue PRN albuterol inhaler
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
## 2022-02-25 NOTE — ASSESSMENT & PLAN NOTE
Patient found to have diverticulosis without evidence of diverticulitis on CT  Patient is asymptomatic
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
## 2022-02-25 NOTE — ASSESSMENT & PLAN NOTE
AST of 49 and total bilirubin of 1 13 on admission      Plan:  · f/u direct bilirubin   · repeat CMP in AM

## 2022-02-25 NOTE — ASSESSMENT & PLAN NOTE
DDimer noted to be 2 36 on presentation  PE study in the ED was negative      Plan:  · heparin gtt for now until Eliquis is price checked

## 2022-02-25 NOTE — ASSESSMENT & PLAN NOTE
BMI 36 2  Diet consists mainly of burgers  Exercise includes walking 1 5 to 2 miles daily       -  patient on heart healthy diet   - f/u Lipid panel   - f/u HgA1C

## 2022-02-25 NOTE — ASSESSMENT & PLAN NOTE
On CT patient found to have cardiomegaly with outpouching of the apex of the heart  Plan:  · Per radiology, further evaluation with echocardiogram is recommended to evaluate for ventricular aneurysm     · Echo ordered

## 2022-02-25 NOTE — ASSESSMENT & PLAN NOTE
Patient presents with worsening SOB/SCHERER the past 2 days  Albuterol inhaler helps improve SOB  Found to be in afib with RVR on admission  D-dimer 2 36  PE study with CT abd and pelvis with contrast showed no evidence of PE, however did reveal small bilateral pleural effusions with compressive atelectasis, mild pulmonary edema, and cardiomegaly   No signs of volume overload on physical exam      Plan:   · Echo ordered   · F/u COVID/flu/RSV  · Continue home albuterol inhaler prn   · Monitor I/Os  · Daily weights  · F/u BNP  · Consider diuresis with IV lasix
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
## 2022-02-25 NOTE — CONSULTS
Consultation - General Cardiology Team 2  [place] 56 y o  male MRN: [id number]  Unit/Bed#: ED 15 Encounter: [id number]      Inpatient consult to Cardiology  Consult performed by: Rogelio Hicks DO  Consult ordered by: Gorge Sanchez MD        PCP: Quin Ford MD (Inactive)   Outpatient Cardiologist: PRISCA    History of Present Illness   Physician Requesting Consult: Jose Antonio Vernon DO  Reason for Consult / Principal Problem: Afib with RVR    HPI: Heather is a 56y o  year old male with a history of hypertension, asthma, and cognitive deficit who presents 2/25/22 for dyspnea on exertion x2 days  Patient notices the shortness of breath only with exertion, and it resolves within a couple minutes of rest  Patient started to become concerned after discussing with his brother, and presented to the ED  Patient does not follow with a doctor regularly  Denies symptoms of CRUZITO  Denies lightheadedness, syncope, vision changes, chest pain with or without exertion, palpitations, orthopnea, PND, nausea/vomiting, edema  Family history:  - Brother (59 y/o) with afib  - Maternal grandfather passed away from MI  Social history:   - Caffeine- minimal  - Alcohol- none   - Tobacco- no smoking or vaping   - Drug use (Amphetamines/cocaine/methamphetamines)- none   - Physical activity- reports 15-30 minutes of physical activity most days    ED course:   - Vitals on arrival: HR 150s, BP 160s/100s, RR 30s, normal ox on room air  - Electrolytes and thyroid function normal  - D-dimer elevated 2 36  - CTA PE study negative for PE, did show small bilateral pleural effusions and mild pulmonary edema, with concern ventricular aneurysm with cardiomegaly and outpouching of the apex  - s/p Lopressor 5 mg IV x2, Lopressor 10 mg IV x1, Cardizem 10 mg IV x1  - Placed on Cardizem gtt  @ 10/hr and Heparin gtt        Cardiac workup:   - EKG shows AFib with rate 143 (prior NSR in 2013)  - negative troponins  - BNP elevated 957   - TTE showed EF 60%, normal echo    Review of Systems  A full review of systems was conducted and is otherwise negative except as otherwise stated above  Historical Information   Past Medical History:   Diagnosis Date    Hypertension      History reviewed  No pertinent surgical history  Social History     Substance and Sexual Activity   Alcohol Use Never     Social History     Substance and Sexual Activity   Drug Use Not Currently     Social History     Tobacco Use   Smoking Status Never Smoker   Smokeless Tobacco Never Used     Family History: See above    Meds/Allergies   Hospital Medications:   Current Facility-Administered Medications   Medication Dose Route Frequency    acetaminophen (TYLENOL) tablet 650 mg  650 mg Oral Q6H PRN    albuterol (PROVENTIL HFA,VENTOLIN HFA) inhaler 2 puff  2 puff Inhalation Q4H PRN    diltiazem (CARDIZEM) 125 mg in sodium chloride 0 9 % 125 mL infusion  1-15 mg/hr Intravenous Titrated    furosemide (LASIX) injection 20 mg  20 mg Intravenous Once    heparin (porcine) 25,000 Units in sodium chloride 0 45 % 250 mL infusion  3-20 Units/kg/hr (Order-Specific) Intravenous Titrated    metoprolol tartrate (LOPRESSOR) tablet 25 mg  25 mg Oral Q12H [address]     Home Medications: (Not in a hospital admission)      Allergies   Allergen Reactions    Penicillins Other (See Comments)     unknown       Objective   Vitals: Blood pressure 128/85, pulse 104, temperature 97 8 °F (36 6 °C), temperature source Oral, resp  rate (!) 30, height 5' 10" (1 778 m), weight 116 kg (255 lb), SpO2 93 %    Orthostatic Blood Pressures      Most Recent Value   Blood Pressure 128/85 filed at 02/25/2022 1100   Patient Position - Orthostatic VS Lying filed at 02/25/2022 0500            Invasive Devices  Report    Peripheral Intravenous Line            Peripheral IV 02/24/22 Right Antecubital <1 day    Peripheral IV 02/25/22 Left Forearm <1 day                Physical Exam  GEN: [employer] appears well, pleasant and cooperative   HEENT:  Normocephalic, atraumatic, anicteric, moist mucous membranes  NECK: No JVD or carotid bruits   HEART: Irregular rhythm, regular rate, normal S1 and S2, no murmurs, clicks, gallops or rubs   LUNGS: Clear to auscultation bilaterally; no wheezes, rales, or rhonchi; respiration nonlabored   ABDOMEN:  Normoactive bowel sounds, soft, no tenderness, no distention  EXTREMITIES: Lower extremity edema, peripheral pulses palpable  NEURO: no gross focal findings; cranial nerves grossly intact   SKIN:  Dry, intact, warm to touch    Lab Results: I have personally reviewed pertinent lab results  Results from last 7 days   Lab Units 02/25/22  0119   NT-PRO BNP pg/mL 957*     Results from last 7 days   Lab Units 02/25/22  0547 02/24/22 2015   POTASSIUM mmol/L 4 5 4 6   CO2 mmol/L 23 27   CHLORIDE mmol/L 108 107   BUN mg/dL 19 22   CREATININE mg/dL 1 17 1 20     Results from last 7 days   Lab Units 02/25/22  0547 02/24/22 2015   HEMOGLOBIN g/dL 13 8 14 0   HEMATOCRIT % 44 6 45 4   PLATELETS Thousands/uL 207 217     Results from last 7 days   Lab Units 02/25/22  0948 02/25/22  0230   PTT seconds 31 30     Results from last 7 days   Lab Units 02/25/22  0119   HDL mg/dL 23*   LDL CALC mg/dL 123*   TRIGLYCERIDES mg/dL 113         Imaging: I have personally reviewed pertinent reports  Holter/Telemetry:   Reviewed    ECHO: No results found for this or any previous visit  CATH/STRESS TEST:   None to review    EKG: Reviewed, see above      VTE Prophylaxis: Heparin gtt  Assessment/Plan     Assessment:  Atrial fibrillation with RVR      Plan:    1  New onset atrial fibrillation with RVR:  Evidenced on EKG and telemetry with initial heart rates 140-150  Status post multiple pushes of IV Lopressor and subsequently started Cardizem bolus to drip  Currently on heparin gtt  Rates now more controlled 's  CHADS-VASc is 2 (hypertension and diabetes)  Normal electrolytes and thyroid function    No PE on CTA  Normal TTE  No clear acute trigger for AFib, however risk factors include hypertension, diabetes, and obesity    - Given patient's relatively young age and symptomatic afib, discussed options for cardioversion including electrical/pharmacolgoic and need for a JET prior due to onset of AFib > 48 hrs  Patient is not interested in cardioversion at this time and would like to be treated with rate controlling medications at this time  - Discontinue Cardizem gtt  - Increase Lopressor to 50 mg b i d   - AC: Discussed the risks and benefits of starting anticoagulation, patient verbalizes understanding  Plan to transition from heparin gtt  to Eliquis 5 mg b i d    - consider outpatient sleep study to evaluate for CRUZITO  - control risk factors for AFib including diabetes, obesity and hypertension  - follow-up with [facility] office in 1 week    Case discussed and reviewed with Dr Dony Issa who agrees with my assessment and plan  Thank you for involving us in the care of your patient  Christine Benites, DO PGY-1  [facility]       ==========================================================================================    Counseling / Coordination of Care  Total floor / unit time spent today 30 minutes minutes  Greater than 50% of total time was spent with the patient and / or family counseling and / or coordination of care  A description of the counseling / coordination of care  [facility]/Care Everywhere records reviewed  ** Please Note: Fluency DirectDictation voice to text software may have been used in the creation of this document   **

## 2022-02-25 NOTE — ASSESSMENT & PLAN NOTE
Patient reports history of HTN but has not seen a doctor in years and does not take any medications       Plan:  · s/p 20 mg IV metoprolol given in ED  · Given Cardizem 10mg bolus followed by Cardizem gtt to target HR <100 with SBP >100  · Monitor BP
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
## 2022-02-25 NOTE — ED PROVIDER NOTES
History  Chief Complaint   Patient presents with    Weakness - Generalized     x3 days when walking increasing SOB, exhaustion, dizziness; does not see dr regularly; brother is w pt states that pt 'downplays symptoms because he's worried about coming to the drs' and has concerns RE cholesterol, heart failure     69-year-old male patient with history of hypertension, asthma presenting with shortness of breath worsening on exertion x2 days  Patient states that when he walks more than usual, he gets short of breath  Patient states that he has uses albuterol inhaler and improves his symptoms  He denies chest pain, fever, cough, abdominal pain, nausea vomiting diarrhea, urinary symptoms  Patient's brother who brought patient to ED states is concerned as patient does not follow up with any doctors and has a family history of cardiac issues  Denies recent travel history, history of cancer, history of clots  None       Past Medical History:   Diagnosis Date    Hypertension        History reviewed  No pertinent surgical history  History reviewed  No pertinent family history  I have reviewed and agree with the history as documented  E-Cigarette/Vaping     E-Cigarette/Vaping Substances     Social History     Tobacco Use    Smoking status: Never Smoker    Smokeless tobacco: Never Used   Substance Use Topics    Alcohol use: Never    Drug use: Not Currently        Review of Systems   Constitutional: Negative for chills and fever  HENT: Negative for congestion, rhinorrhea and sore throat  Eyes: Negative for pain and visual disturbance  Respiratory: Positive for shortness of breath  Negative for cough and chest tightness  Cardiovascular: Negative for chest pain and leg swelling  Gastrointestinal: Negative for abdominal pain, diarrhea, nausea and vomiting  Genitourinary: Negative for difficulty urinating and dysuria  Musculoskeletal: Negative for arthralgias, back pain and myalgias     Skin: Negative for rash and wound  Neurological: Negative for dizziness and headaches  Psychiatric/Behavioral: The patient is nervous/anxious  All other systems reviewed and are negative  Physical Exam  ED Triage Vitals   Temperature Pulse Respirations Blood Pressure SpO2   02/24/22 1859 02/24/22 1859 02/24/22 1859 02/24/22 1859 02/24/22 1859   97 8 °F (36 6 °C) 86 20 (!) 166/117 94 %      Temp Source Heart Rate Source Patient Position - Orthostatic VS BP Location FiO2 (%)   02/24/22 1859 02/24/22 2240 02/24/22 1859 02/24/22 1859 --   Oral Monitor Sitting Right arm       Pain Score       --                    Orthostatic Vital Signs  Vitals:    02/25/22 1354 02/25/22 1445 02/25/22 1449 02/25/22 1600   BP: 129/73 147/83 147/83 127/76   Pulse: (!) 113 (!) 116 105 102   Patient Position - Orthostatic VS:   Lying        Physical Exam  Vitals reviewed  Constitutional:       Appearance: He is obese  HENT:      Head: Normocephalic and atraumatic  Nose: Nose normal       Mouth/Throat:      Mouth: Mucous membranes are moist       Pharynx: Oropharynx is clear  Eyes:      Extraocular Movements: Extraocular movements intact  Conjunctiva/sclera: Conjunctivae normal    Cardiovascular:      Rate and Rhythm: Tachycardia present  Rhythm irregular  Pulses: Normal pulses  Heart sounds: Normal heart sounds  Pulmonary:      Effort: Pulmonary effort is normal       Breath sounds: Normal breath sounds  Abdominal:      General: Bowel sounds are normal       Palpations: Abdomen is soft  Tenderness: There is no abdominal tenderness  Musculoskeletal:         General: Normal range of motion  Cervical back: Normal range of motion  Skin:     General: Skin is warm and dry  Neurological:      General: No focal deficit present  Mental Status: He is alert and oriented to person, place, and time  Mental status is at baseline           ED Medications  Medications   albuterol (PROVENTIL HFA,VENTOLIN HFA) inhaler 2 puff (has no administration in time range)   acetaminophen (TYLENOL) tablet 650 mg (has no administration in time range)   metoprolol (LOPRESSOR) injection 5 mg (has no administration in time range)   apixaban (ELIQUIS) tablet 5 mg (has no administration in time range)   metoprolol tartrate (LOPRESSOR) tablet 50 mg (has no administration in time range)   metoprolol tartrate (LOPRESSOR) tablet 25 mg (has no administration in time range)   metoprolol (LOPRESSOR) injection 5 mg (5 mg Intravenous Given 2/24/22 2037)   metoprolol (LOPRESSOR) injection 5 mg (5 mg Intravenous Given 2/24/22 2122)   iohexol (OMNIPAQUE) 350 MG/ML injection (SINGLE-DOSE) 100 mL (100 mL Intravenous Given 2/24/22 2225)   metoprolol (LOPRESSOR) injection 10 mg (10 mg Intravenous Given 2/24/22 2249)   diltiazem (CARDIZEM) injection 10 mg (10 mg Intravenous Given 2/25/22 0119)   magnesium sulfate 2 g/50 mL IVPB (premix) 2 g (0 g Intravenous Stopped 2/25/22 0150)   perflutren lipid microsphere (DEFINITY) injection (1 2 mL/min Intravenous Given 2/25/22 0930)   furosemide (LASIX) injection 20 mg (20 mg Intravenous Given 2/25/22 1354)       Diagnostic Studies  Results Reviewed     Procedure Component Value Units Date/Time    APTT six (6) hours after Heparin bolus/drip initiation or dosing change [[id number]]     Lab Status: No result Specimen: Blood     Hemoglobin A1C [[id number]]  (Abnormal) Collected: 02/25/22 0547    Lab Status: Final result Specimen: Blood from Arm, Right Updated: 02/25/22 1320     Hemoglobin A1C 6 7 %       mg/dl     Lipid Panel with Direct LDL reflex [[id number]]  (Abnormal) Collected: 02/25/22 0547    Lab Status: Final result Specimen: Blood from Arm, Right Updated: 02/25/22 1313     Cholesterol 175 mg/dL      Triglycerides 87 mg/dL      HDL, Direct 21 mg/dL      LDL Calculated 137 mg/dL     APTT six (6) hours after Heparin bolus/drip initiation or dosing change [[id number]]  (Normal) Collected: 02/25/22 0948    Lab Status: Final result Specimen: Blood from Arm, Left Updated: 02/25/22 1027     PTT 31 seconds     COVID/FLU/RSV [[id number]]  (Normal) Collected: 02/25/22 0800    Lab Status: Final result Specimen: Nares from Nose Updated: 02/25/22 0905     SARS-CoV-2 Negative     INFLUENZA A PCR Negative     INFLUENZA B PCR Negative     RSV PCR Negative    Narrative:      FOR PEDIATRIC PATIENTS - copy/paste COVID Guidelines URL to browser: [email or web address]    SARS-CoV-2 assay is a Nucleic Acid Amplification assay intended for the  qualitative detection of nucleic acid from SARS-CoV-2 in nasopharyngeal  swabs  Results are for the presumptive identification of SARS-CoV-2 RNA  Positive results are indicative of infection with SARS-CoV-2, the virus  causing COVID-19, but do not rule out bacterial infection or co-infection  with other viruses  Laboratories within the [place] and its  territories are required to report all positive results to the appropriate  public health authorities  Negative results do not preclude SARS-CoV-2  infection and should not be used as the sole basis for treatment or other  patient management decisions  Negative results must be combined with  clinical observations, patient history, and epidemiological information  This test has not been FDA cleared or approved  This test has been authorized by FDA under an Emergency Use Authorization  (EUA)  This test is only authorized for the duration of time the  declaration that circumstances exist justifying the authorization of the  emergency use of an in vitro diagnostic tests for detection of SARS-CoV-2  virus and/or diagnosis of COVID-19 infection under section 564(b)(1) of  the Act, 21 U  S C  604GES-5(X)(1), unless the authorization is terminated  or revoked sooner  The test has been validated but independent review by FDA  and CLIA is pending      Test performed using [employer] GeneXpert: This RT-PCR assay targets N2,  a region unique to SARS-CoV-2  A conserved region in the E-gene was chosen  for pan-Sarbecovirus detection which includes SARS-CoV-2      APTT six (6) hours after Heparin bolus/drip initiation or dosing change [[id number]]     Lab Status: No result Specimen: Blood     Comprehensive metabolic panel [[id number]]  (Abnormal) Collected: 02/25/22 0547    Lab Status: Final result Specimen: Blood from Arm, Right Updated: 02/25/22 0624     Sodium 138 mmol/L      Potassium 4 5 mmol/L      Chloride 108 mmol/L      CO2 23 mmol/L      ANION GAP 7 mmol/L      BUN 19 mg/dL      Creatinine 1 17 mg/dL      Glucose 147 mg/dL      Calcium 9 1 mg/dL      AST 24 U/L      ALT 24 U/L      Alkaline Phosphatase 101 U/L      Total Protein 8 3 g/dL      Albumin 4 0 g/dL      Total Bilirubin 1 33 mg/dL      eGFR 74 ml/min/1 73sq m     Narrative:      [facility] guidelines for Chronic Kidney Disease (CKD):     Stage 1 with normal or high GFR (GFR > 90 mL/min/1 73 square meters)    Stage 2 Mild CKD (GFR = 60-89 mL/min/1 73 square meters)    Stage 3A Moderate CKD (GFR = 45-59 mL/min/1 73 square meters)    Stage 3B Moderate CKD (GFR = 30-44 mL/min/1 73 square meters)    Stage 4 Severe CKD (GFR = 15-29 mL/min/1 73 square meters)    Stage 5 End Stage CKD (GFR <15 mL/min/1 73 square meters)  Note: GFR calculation is accurate only with a steady state creatinine    CBC and differential [[id number]]  (Abnormal) Collected: 02/25/22 0547    Lab Status: Final result Specimen: Blood from Arm, Right Updated: 02/25/22 0602     WBC 10 17 Thousand/uL      RBC 5 15 Million/uL      Hemoglobin 13 8 g/dL      Hematocrit 44 6 %      MCV 87 fL      MCH 26 8 pg      MCHC 30 9 g/dL      RDW 15 6 %      MPV 11 5 fL      Platelets 662 Thousands/uL      nRBC 0 /100 WBCs      Neutrophils Relative 68 %      Immat GRANS % 0 %      Lymphocytes Relative 18 %      Monocytes Relative 11 %      Eosinophils Relative 2 %      Basophils Relative 1 %      Neutrophils Absolute 6 88 Thousands/µL      Immature Grans Absolute 0 04 Thousand/uL      Lymphocytes Absolute 1 82 Thousands/µL      Monocytes Absolute 1 14 Thousand/µL      Eosinophils Absolute 0 24 Thousand/µL      Basophils Absolute 0 05 Thousands/µL     Magnesium [[id number]]  (Normal) Collected: 02/25/22 0119    Lab Status: Final result Specimen: Blood from Arm, Right Updated: 02/25/22 0534     Magnesium 2 3 mg/dL     Phosphorus [[id number]]  (Normal) Collected: 02/25/22 0119    Lab Status: Final result Specimen: Blood from Arm, Right Updated: 02/25/22 0534     Phosphorus 3 3 mg/dL     Bilirubin, direct [[id number]]  (Abnormal) Collected: 02/25/22 0119    Lab Status: Final result Specimen: Blood from Arm, Right Updated: 02/25/22 0534     Bilirubin, Direct 0 33 mg/dL     Protime-INR [[id number]]  (Normal) Collected: 02/25/22 0230    Lab Status: Final result Specimen: Blood from Arm, Right Updated: 02/25/22 0259     Protime 14 2 seconds      INR 1 14    APTT [[id number]]  (Normal) Collected: 02/25/22 0230    Lab Status: Final result Specimen: Blood from Arm, Right Updated: 02/25/22 0259     PTT 30 seconds     Hemoglobin A1C [[id number]]  (Abnormal) Collected: 02/24/22 2015    Lab Status: Final result Specimen: Blood from Arm, Left Updated: 02/25/22 0240     Hemoglobin A1C 6 6 %       mg/dl     NT-BNP PRO [[id number]]  (Abnormal) Collected: 02/25/22 0119    Lab Status: Final result Specimen: Blood from Arm, Right Updated: 02/25/22 0152     NT-proBNP 957 pg/mL     Lipid panel [[id number]]  (Abnormal) Collected: 02/25/22 0119    Lab Status: Final result Specimen: Blood from Arm, Right Updated: 02/25/22 0152     Cholesterol 169 mg/dL      Triglycerides 113 mg/dL      HDL, Direct 23 mg/dL      LDL Calculated 123 mg/dL      Non-HDL-Chol (CHOL-HDL) 146 mg/dl     TSH, 3rd generation with Free T4 reflex [[id number]]  (Normal) Collected: 02/25/22 0119    Lab Status: Final result Specimen: Blood from Arm, Right Updated: 02/25/22 0152     TSH 3RD GENERATON 2 800 uIU/mL     Narrative:      Patients undergoing fluorescein dye angiography may retain small amounts of fluorescein in the body for 48-72 hours post procedure  Samples containing fluorescein can produce falsely depressed TSH values  If the patient had this procedure,a specimen should be resubmitted post fluorescein clearance        HS Troponin I 2hr [[id number]]  (Normal) Collected: 02/24/22 2240    Lab Status: Final result Specimen: Blood from Arm, Right Updated: 02/24/22 2324     hs TnI 2hr 5 ng/L      Delta 2hr hsTnI 0 ng/L     HS Troponin 0hr (reflex protocol) [[id number]]  (Normal) Collected: 02/24/22 2015    Lab Status: Final result Specimen: Blood from Arm, Left Updated: 02/24/22 2144     hs TnI 0hr 5 ng/L     D-dimer, quantitative [[id number]]  (Abnormal) Collected: 02/24/22 2036    Lab Status: Final result Specimen: Blood from Arm, Right Updated: 02/24/22 2143     D-Dimer, Quant 2 36 ug/ml FEU     Comprehensive metabolic panel [[id number]]  (Abnormal) Collected: 02/24/22 2015    Lab Status: Final result Specimen: Blood from Arm, Left Updated: 02/24/22 2136     Sodium 139 mmol/L      Potassium 4 6 mmol/L      Chloride 107 mmol/L      CO2 27 mmol/L      ANION GAP 5 mmol/L      BUN 22 mg/dL      Creatinine 1 20 mg/dL      Glucose 124 mg/dL      Calcium 9 3 mg/dL      AST 49 U/L      ALT 29 U/L      Alkaline Phosphatase 108 U/L      Total Protein 8 4 g/dL      Albumin 4 0 g/dL      Total Bilirubin 1 13 mg/dL      eGFR 72 ml/min/1 73sq m     Narrative:      [facility] guidelines for Chronic Kidney Disease (CKD):     Stage 1 with normal or high GFR (GFR > 90 mL/min/1 73 square meters)    Stage 2 Mild CKD (GFR = 60-89 mL/min/1 73 square meters)    Stage 3A Moderate CKD (GFR = 45-59 mL/min/1 73 square meters)    Stage 3B Moderate CKD (GFR = 30-44 mL/min/1 73 square meters)    Stage 4 Severe CKD (GFR = 15-29 mL/min/1 73 square meters)    Stage 5 End Stage CKD (GFR <15 mL/min/1 73 square meters)  Note: GFR calculation is accurate only with a steady state creatinine    CBC and differential [[id number]]  (Abnormal) Collected: 02/24/22 2015    Lab Status: Final result Specimen: Blood from Arm, Left Updated: 02/24/22 2113     WBC 9 00 Thousand/uL      RBC 5 15 Million/uL      Hemoglobin 14 0 g/dL      Hematocrit 45 4 %      MCV 88 fL      MCH 27 2 pg      MCHC 30 8 g/dL      RDW 15 5 %      MPV 11 7 fL      Platelets 190 Thousands/uL      nRBC 0 /100 WBCs      Neutrophils Relative 76 %      Immat GRANS % 0 %      Lymphocytes Relative 14 %      Monocytes Relative 8 %      Eosinophils Relative 2 %      Basophils Relative 0 %      Neutrophils Absolute 6 81 Thousands/µL      Immature Grans Absolute 0 03 Thousand/uL      Lymphocytes Absolute 1 22 Thousands/µL      Monocytes Absolute 0 69 Thousand/µL      Eosinophils Absolute 0 21 Thousand/µL      Basophils Absolute 0 04 Thousands/µL                  PE Study with CT Abdomen and Pelvis with contrast   Final Result by Silvia Bloom MD (02/24 2317)      1  No evidence of pulmonary embolism  2   Small bilateral pleural effusions with adjacent compressive atelectasis  Mild pulmonary edema  Small focal opacity in the left lower lobe which may be due to pneumonia in the appropriate clinical setting  3   Cardiomegaly with outpouching of the apex of the heart  Further evaluation with echocardiogram is recommended to evaluate for ventricular aneurysm  Scattered pericardial calcifications, correlate for prior pericarditis  4   Scattered pleural calcifications, correlate for prior asbestos exposure  5   Small hiatal hernia  Diverticulosis without evidence of diverticulitis  6   Deep subcutaneous fluid collection along the right gluteal fascia measuring up to 8 3 x 2 3 x 19 1 cm with mildly thickened rim    Findings may be due to seroma however superimposed infection cannot be excluded  Workstation performed: [id number]               Procedures  Procedures      ED Course  ED Course as of 02/25/22 1828   Thu Feb 24, 2022 2158 EKG: Afib w/   Twave inversion in III, aVF  2210 HR in 120s  [id number] SCORE      Most Recent Value   [id number]    Age 0 Filed at: 02/24/2022 2152   Sex 0 Filed at: 02/24/2022 2152   CHF History 0 Filed at: 02/24/2022 2152   HTN History 1 Filed at: 02/24/2022 2152   Stroke or TIA Symptoms Previously 0 Filed at: 02/24/2022 2152   Vascular Disease History 0 Filed at: 02/24/2022 2152   Diabetes History 0 Filed at: 02/24/2022 2152   [id number] Score 1 Filed at: 02/24/2022 2152                                Karen Muñoz Criteria for PE      Most Recent Value   Wells' Criteria for PE    Clinical signs and symptoms of DVT 0 Filed at: 02/24/2022 2152   PE is primary diagnosis or equally likely 0 Filed at: 02/24/2022 2152   HR >100 1 5 Filed at: 02/24/2022 2152   Immobilization at least 3 days or Surgery in the previous 4 weeks 0 Filed at: 02/24/2022 2152   Previous, objectively diagnosed PE or DVT 0 Filed at: 02/24/2022 2152   Hemoptysis 0 Filed at: 02/24/2022 2152   Malignancy with treatment within 6 months or palliative 0 Filed at: 02/24/2022 2152   Karen Muñoz Criteria Total 1 5 Filed at: 02/24/2022 2152            MDM  Number of Diagnoses or Management Options  A-fib [facility])  Diagnosis management comments: 57 y/o male patient with hx of asthma presenting with Shortness of breath  Patient found to be in afib w/ RVR  On exam, Patient CTA, tachycardic and irregular  Denies chest pain  Labs positive for elevated ddimer  CT PE study negative for PE  Patient was rate controlled with lopressor  Will admit to medicine for AFIB w/ RVR          Amount and/or Complexity of Data Reviewed  Clinical lab tests: ordered and reviewed        Disposition  Final diagnoses:   A-fib ([address] )     Time reflects when diagnosis was documented in both MDM as applicable and the Disposition within this note     Time User Action Codes Description Comment    2/25/2022 12:08 AM Tomeka GHOTRA [facility] Add [I48 91] A-fib ([address] )     2/25/2022  4:11 PM Shani Woodard Add [E11 9] Type 2 diabetes mellitus, without long-term current use of insulin ([address] )     2/25/2022  4:13 PM Marijason Ramirez Add [K16 947] Asthma       ED Disposition     ED Disposition Condition Date/Time Comment    Admit Stable Fri Feb 25, 2022 12:08 AM Case was discussed with Dr Enzo Davenport and the patient's admission status was agreed to be Admission Status: inpatient status to the service of Dr Amanda Pendleton          Follow-up Information     Follow up With Specialties Details Why Contact Info Additional Information    [address] [place] [address] Cardiology Follow up on 3/3/2022 11:20am with Dev Mcnair  [address], [address] Internal Medicine Follow up  [address] [address]  [place] [address], [address], [address] [phone]    [facility]  Follow up  [phone]             Discharge Medication List as of 2/25/2022  5:25 PM      START taking these medications    Details   albuterol (PROVENTIL HFA,VENTOLIN HFA) 90 mcg/act inhaler Inhale 2 puffs every 4 (four) hours as needed for wheezing or shortness of breath, Starting Fri 2/25/2022, Normal      metFORMIN (GLUCOPHAGE-XR) 500 mg 24 hr tablet Take 1 tablet (500 mg total) by mouth daily with dinner, Starting Fri 2/25/2022, Until Sun 3/27/2022, Normal      metoprolol tartrate (LOPRESSOR) 50 mg tablet Take 1 tablet (50 mg total) by mouth every 12 (twelve) hours, Starting Fri 2/25/2022, Until Sun 3/27/2022, Normal      rosuvastatin (CRESTOR) 10 MG tablet Take 1 tablet (10 mg total) by mouth daily, Starting Fri 2/25/2022, Until Sun 3/27/2022, Normal         CONTINUE these medications which have CHANGED    Details   apixaban (Eliquis) 5 mg Take 1 tablet (5 mg total) by mouth 2 (two) times a day, Starting Fri 2/25/2022, Until Sun 3/27/2022, Normal           Outpatient Discharge Orders   Ambulatory referral to Cardiology   Standing Status: Future Standing Exp  Date: 02/25/23      Activity as tolerated       PDMP Review     None           ED Provider  Attending physically available and evaluated [employer]  I managed the patient along with the ED Attending      Electronically Signed by         Faye Earl MD  02/25/22 0106

## 2022-02-25 NOTE — CASE MANAGEMENT
Case Management Assessment & Discharge Planning Note    Patient name Joao Painter  Location ED 15/ED 15 MRN [id number]  : 1975 Date 2022       Current Admission Date: 2022  Current Admission Diagnosis:Atrial fibrillation with rapid ventricular response [facility])   Patient Active Problem List    Diagnosis Date Noted    Hypertension 2022    Asthma 2022    Atrial fibrillation with rapid ventricular response ([facility] ) 2022    Elevated liver enzymes 2022    Elevated d-dimer 2022    [facility] to [facility] 20[facility]    Bilateral pleural effusion 2022    Hiatal hernia 2022    Dyspnea on minimal exertion 2022    Obesity 2022    Cognitive deficits 2022    Cardiomegaly 2022    Diverticulosis of colon without diverticulitis 2022    Type 2 diabetes mellitus, without long-term current use of insulin ([facility] ) 2022      LOS (days): 0  Geometric Mean LOS (GMLOS) (days):   Days to GMLOS:     OBJECTIVE:        Current admission status: Observation  Referral Reason: Other (dispo planning)    Preferred Pharmacy:   [address] [place] [address] [id number] [place] [id number] [place] [address]  Phone: [phone] Fax: [phone]    [facility] [address][id number], [address] [facility], [address], [address] [place] [address]  Phone: [phone] Fax: [phone]    Primary Care Provider: Harjinder Landis MD (Inactive)    Primary Insurance: MEDICARE  Secondary Insurance: Carlos Cowan [address]:  [place], [address] [facility] Phone: [phone] (Mobile)                  Readmission Root Cause  30 Day Readmission: No    Patient Information  Admitted from[de-identified] Home  Mental Status: Alert  During Assessment patient was accompanied by: Not accompanied during assessment  Assessment information provided by[de-identified] Patient  Primary Caregiver: Self  Support Systems: Self,Parent,Family members  [place] of Residence: [address] [facility] do you live in?: [facility] entry access options   Select all that apply : No steps to enter home  Type of Current Residence: Apartment  Floor Level: 2  Upon entering residence, is there a bedroom on the main floor (no further steps)?: Yes  Upon entering residence, is there a bathroom on the main floor (no further steps)?: Yes  In the last 12 months, was there a time when you were not able to pay the mortgage or rent on time?: No  In the last 12 months, how many places have you lived?: 1  In the last 12 months, was there a time when you did not have a steady place to sleep or slept in a shelter (including now)?: No  Homeless/housing insecurity resource given?: No  Living Arrangements: Other (Comment) (has roommates)    Activities of Daily Living Prior to Admission  Functional Status: Independent  Completes ADLs independently?: Yes  Ambulates independently?: Yes  Does patient use assisted devices?: No  Does patient currently own DME?: No  Does patient have a history of Outpatient Therapy (PT/OT)?: No  Does the patient have a history of Short-Term Rehab?: No  Does patient have a history of HHC?: No  Does patient currently have EXPOu ?: No    Patient Information Continued  Income Source: SSI/SSD  Does patient have prescription coverage?: Yes  Within the past 12 months, you worried that your food would run out before you got the money to buy more : Never true  Within the past 12 months, the food you bought just didnt last and you didnt have money to get more : Never true  Food insecurity resource given?: No  Does patient receive dialysis treatments?: No  Does patient have a history of substance abuse?: No  Does patient have a history of Mental Health Diagnosis?: No    Means of Transportation  Means of Transport to [employer][de-identified] [employer]  In the past 12 months, has lack of transportation kept you from medical appointments or from getting medications?: No  In the past 12 months, has lack of transportation kept you from meetings, work, or from getting things needed for daily living?: No  Was application for public transport provided?: N/A    DISCHARGE DETAILS:    Discharge planning discussed with[de-identified] patient  Freedom of Choice: Yes     CM contacted family/caregiver?: No- see comments (CM spoke to pt about CM reaching out to family  Pt statest that his parents know he is at the hospital and he does not need CM to call them)     Treatment Team Recommendation: Home  Discharge Destination Plan[de-identified] Home  Transport at Discharge : Family      Additional Comments: CM met [facility] pt at bedside to complete Open  Pt was able to complete Open with CM without difficulty  Pt stats that he lives in an appartment and has roommates  Pt states that he is independent and takes the bus to get to appointments  Pt states that he has support from his parents and his brother, pt's father will be able to transport pt home at d /c

## 2022-02-25 NOTE — ASSESSMENT & PLAN NOTE
Patient presents with worsening SCHERER and found to be in afib with RVR on admission  Has not seen a doctor in years and is not taking any medication      Plan:  · S/p 20 mg IV metoprolol given in ED  · Given Cardizem 10mg bolus followed by Cardizem gtt to target HR <100 with SBP >100  · Heparin gtt until Eliquis is price checked   · Telemetry   · Echo ordered

## 2022-02-25 NOTE — DISCHARGE INSTR - AVS FIRST PAGE
Please follow up with PCP - if you do not have a PCP, call infolink to obtain one  You are also welcome to follow up with our clinic [facility]  Follow up with a PCP in 1-2 weeks   Please follow up with cardiology as well

## 2022-02-25 NOTE — ASSESSMENT & PLAN NOTE
Lab Results   Component Value Date    HGBA1C 6 6 (H) 02/24/2022       No results for input(s): POCGLU in the last 72 hours  Blood Sugar Average: Last 72 hrs:     Newly diagnosed with HgbA1c on admission of 6 6  Insulin naive    · SSI while here in hospital  · Consider metformin on discharge if patient amenable  · PCP outpatient follow up  · Initiate Atorvastatin 40 mg qd

## 2022-02-25 NOTE — ASSESSMENT & PLAN NOTE
Patient presents with worsening SCHERER  Small bilateral effusions with compressive atelectasis found on CT along with mild pulmonary edema      Plan:  · Will check BNP  · Currently patient maintaining saturations on room air  · Consider diuresis with IV lasix

## 2022-02-25 NOTE — ED NOTES
Per Dr Zhang Letters, it is okay to keep pt at 10 mL/hr for Cardizem infusion due to current stable vitals        Kris Castro RN  02/25/22 1021

## 2022-02-25 NOTE — ASSESSMENT & PLAN NOTE
Small hiatal hernia discovered on CT abd pelvis  Patient is asymptomatic and denies any chest pain/hearburn or abdominal discomfort       Plan:  · Monitor for any associated symptoms   · PPI if needed
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
## 2022-03-01 ENCOUNTER — PATIENT OUTREACH (OUTPATIENT)
Dept: CASE MANAGEMENT | Facility: OTHER | Age: 47
End: 2022-03-01

## 2022-03-01 NOTE — PROGRESS NOTES
In basket message received with ED discharge  Chart reviewed  I left a message on patient's voicemail with my contact information requesting a return call

## 2022-03-02 ENCOUNTER — PATIENT OUTREACH (OUTPATIENT)
Dept: CASE MANAGEMENT | Facility: OTHER | Age: 47
End: 2022-03-02

## 2022-03-02 NOTE — PROGRESS NOTES
I spoke with patient's father and caregiver  He reports patient started his medications Metoprolol and Eliquis as directed on his discharge instructions  Patient has been asymptomatic since discharge per father  Patient depends on public transportation to go to his appointments  I advised patient's father he needs to schedule a follow up with cardiology and patient's PCP  He understands and will help Keirra with scheduling  He has my contact information and I advised him to call me if I can be of assistance  I explained if Kierra had symptoms return palpitations, shortness of breath or chest pain it would require a return to the ED  He did consent to another call

## 2022-03-10 ENCOUNTER — PATIENT OUTREACH (OUTPATIENT)
Dept: CASE MANAGEMENT | Facility: OTHER | Age: 47
End: 2022-03-10

## 2022-03-10 NOTE — PHYSICIAN ADVISOR
Current patient class: Inpatient  The patient is currently on Hospital Day: 2 at [address] [place] [address]      The patient was admitted to the hospital  on 2/25/22 at  2:24 PM for the following diagnosis:  Weakness [R53 1]     After review of the relevant documentation, labs, vital signs and test results, this is a PROVIDER LIABLE case  In this particular case the patient was admitted to the hospital as an inpatient  The patient however failed to satisfy the 2 midnight benchmark and closer scrutiny of the case was warranted  After review of the patient presentation and relevant labs the patient was most appropriate for observation or outpatient class at the time of admission  Given that this patient has already been discharged prior to this review they become a provider liable case  Rationale is as follows: The patient presented to the hospital late on February 24, 2022  He is a 71-year-old male who complained of shortness of breath and dyspnea on exertion for two days  He was found to be in atrial fibrillation with RVR  He was admitted as an inpatient  The patient's heart rates improved with an AV zachary blocker and he was then asymptomatic  He was evaluated by Cardiology and found to be appropriate for discharge on metoprolol and Eliquis  He was hospitalized for one midnight, 22 hours  Based on the diagnosis and length of stay, observation class would have been appropriate      The patients vitals on arrival were   ED Triage Vitals   Temperature Pulse Respirations Blood Pressure SpO2   02/24/22 1859 02/24/22 1859 02/24/22 1859 02/24/22 1859 02/24/22 1859   97 8 °F (36 6 °C) 86 20 (!) 166/117 94 %      Temp Source Heart Rate Source Patient Position - Orthostatic VS BP Location FiO2 (%)   02/24/22 1859 02/24/22 2240 02/24/22 1859 02/24/22 1859 --   Oral Monitor Sitting Right arm       Pain Score       --                  Past Medical History:   Diagnosis Date    Hypertension      History reviewed  No pertinent surgical history  Consults have been placed to:   IP CONSULT TO CASE MANAGEMENT  INPATIENT CONSULT TO IR  IP CONSULT TO CARDIOLOGY  IP CONSULT TO CASE MANAGEMENT    Vitals:    02/25/22 1354 02/25/22 1445 02/25/22 1449 02/25/22 1600   BP: 129/73 147/83 147/83 127/76   BP Location:   Left arm    Pulse: (!) 113 (!) 116 105 102   Resp:  (!) 24 (!) 26 22   Temp:       TempSrc:       SpO2:  93% 93% 92%   Weight:       Height:           Most recent labs:    No results for input(s): WBC, HGB, HCT, PLT, K, NA, CALCIUM, BUN, CREATININE, LIPASE, AMYLASE, INR, TROPONINI, CKTOTAL, AST, ALT, ALKPHOS, BILITOT in the last 72 hours  Scheduled Meds:  Continuous Infusions:No current facility-administered medications for this encounter  PRN Meds:      Surgical procedures (if appropriate):

## 2022-03-10 NOTE — PROGRESS NOTES
I called and left a message  on father's voicemail with my contact information requesting a return call

## 2022-03-16 ENCOUNTER — PATIENT OUTREACH (OUTPATIENT)
Dept: CASE MANAGEMENT | Facility: OTHER | Age: 47
End: 2022-03-16

## 2022-03-16 NOTE — PROGRESS NOTES
I spoke with Gonzales Angelatasia patient's father  He reports patient is taking his medication  There is no phone number for Kierra in demographics  Patient's phone number is his father's  I stressed the importance of  a follow up with cardiologist  I explained he cannot run out of medications and needs to be evaluated  Per ED note patient does not follow up as recommended and has a cognitive deficit  I advised patient's father that cardiology mailed a letter to Kierra on 3/15 and it has all the information necessary to schedule an appointment  He stated he will advise Keirra to respond to the letter

## 2022-03-24 ENCOUNTER — PATIENT OUTREACH (OUTPATIENT)
Dept: CASE MANAGEMENT | Facility: OTHER | Age: 47
End: 2022-03-24

## 2022-03-24 NOTE — PROGRESS NOTES
I made multiple attempts to engage patient but was not able to  Patient has not scheduled a hospital follow up  I will follow the episode with chart review

## 2022-04-25 ENCOUNTER — PATIENT OUTREACH (OUTPATIENT)
Dept: CASE MANAGEMENT | Facility: OTHER | Age: 47
End: 2022-04-25

## 2022-05-10 ENCOUNTER — APPOINTMENT (INPATIENT)
Dept: NON INVASIVE DIAGNOSTICS | Facility: HOSPITAL | Age: 47
DRG: 308 | End: 2022-05-10
Payer: MEDICARE

## 2022-05-10 ENCOUNTER — APPOINTMENT (EMERGENCY)
Dept: RADIOLOGY | Facility: HOSPITAL | Age: 47
DRG: 308 | End: 2022-05-10
Payer: MEDICARE

## 2022-05-10 ENCOUNTER — HOSPITAL ENCOUNTER (INPATIENT)
Facility: HOSPITAL | Age: 47
LOS: 8 days | Discharge: HOME HEALTH | DRG: 308 | End: 2022-05-18
Attending: EMERGENCY MEDICINE
Payer: MEDICARE

## 2022-05-10 DIAGNOSIS — R00.2 PALPITATIONS: ICD-10-CM

## 2022-05-10 DIAGNOSIS — G47.33 OBSTRUCTIVE SLEEP APNEA SYNDROME: ICD-10-CM

## 2022-05-10 DIAGNOSIS — Z91.89 AT RISK FOR OBSTRUCTIVE SLEEP APNEA: ICD-10-CM

## 2022-05-10 DIAGNOSIS — R29.818 SUSPECTED SLEEP APNEA: ICD-10-CM

## 2022-05-10 DIAGNOSIS — I50.21 ACUTE HFREF (HEART FAILURE WITH REDUCED EJECTION FRACTION) (HCC): ICD-10-CM

## 2022-05-10 DIAGNOSIS — E11.9 TYPE 2 DIABETES MELLITUS WITHOUT COMPLICATION, WITHOUT LONG-TERM CURRENT USE OF INSULIN (HCC): ICD-10-CM

## 2022-05-10 DIAGNOSIS — I10 HYPERTENSION, UNSPECIFIED TYPE: ICD-10-CM

## 2022-05-10 DIAGNOSIS — N17.9 AKI (ACUTE KIDNEY INJURY) (HCC): ICD-10-CM

## 2022-05-10 DIAGNOSIS — R41.89 COGNITIVE DEFICITS: ICD-10-CM

## 2022-05-10 DIAGNOSIS — E87.1 HYPONATREMIA: ICD-10-CM

## 2022-05-10 DIAGNOSIS — I48.91 A-FIB (HCC): ICD-10-CM

## 2022-05-10 DIAGNOSIS — I48.91 ATRIAL FIBRILLATION WITH RAPID VENTRICULAR RESPONSE (HCC): Primary | ICD-10-CM

## 2022-05-10 PROBLEM — M79.89 LEG SWELLING: Status: ACTIVE | Noted: 2022-05-10

## 2022-05-10 PROBLEM — E78.5 HYPERLIPIDEMIA: Status: ACTIVE | Noted: 2022-05-10

## 2022-05-10 LAB
4HR DELTA HS TROPONIN: -1 NG/L
ALBUMIN SERPL BCP-MCNC: 3.7 G/DL (ref 3.5–5)
ALP SERPL-CCNC: 94 U/L (ref 46–116)
ALT SERPL W P-5'-P-CCNC: 27 U/L (ref 12–78)
ANION GAP SERPL CALCULATED.3IONS-SCNC: 8 MMOL/L (ref 4–13)
APTT PPP: 35 SECONDS (ref 23–37)
APTT PPP: 68 SECONDS (ref 23–37)
AST SERPL W P-5'-P-CCNC: 29 U/L (ref 5–45)
ATRIAL RATE: 187 BPM
BASOPHILS # BLD AUTO: 0.04 THOUSANDS/ΜL (ref 0–0.1)
BASOPHILS NFR BLD AUTO: 1 % (ref 0–1)
BILIRUB SERPL-MCNC: 2.52 MG/DL (ref 0.2–1)
BUN SERPL-MCNC: 17 MG/DL (ref 5–25)
CALCIUM SERPL-MCNC: 9.3 MG/DL (ref 8.3–10.1)
CARDIAC TROPONIN I PNL SERPL HS: 5 NG/L
CARDIAC TROPONIN I PNL SERPL HS: 6 NG/L
CHLORIDE SERPL-SCNC: 103 MMOL/L (ref 100–108)
CO2 SERPL-SCNC: 25 MMOL/L (ref 21–32)
CREAT SERPL-MCNC: 1.2 MG/DL (ref 0.6–1.3)
EOSINOPHIL # BLD AUTO: 0.34 THOUSAND/ΜL (ref 0–0.61)
EOSINOPHIL NFR BLD AUTO: 4 % (ref 0–6)
ERYTHROCYTE [DISTWIDTH] IN BLOOD BY AUTOMATED COUNT: 17.5 % (ref 11.6–15.1)
GFR SERPL CREATININE-BSD FRML MDRD: 72 ML/MIN/1.73SQ M
GLUCOSE SERPL-MCNC: 119 MG/DL (ref 65–140)
GLUCOSE SERPL-MCNC: 129 MG/DL (ref 65–140)
GLUCOSE SERPL-MCNC: 135 MG/DL (ref 65–140)
HCT VFR BLD AUTO: 45.9 % (ref 36.5–49.3)
HGB BLD-MCNC: 13.4 G/DL (ref 12–17)
IMM GRANULOCYTES # BLD AUTO: 0.03 THOUSAND/UL (ref 0–0.2)
IMM GRANULOCYTES NFR BLD AUTO: 0 % (ref 0–2)
INR PPP: 1.39 (ref 0.84–1.19)
LYMPHOCYTES # BLD AUTO: 1.25 THOUSANDS/ΜL (ref 0.6–4.47)
LYMPHOCYTES NFR BLD AUTO: 15 % (ref 14–44)
MCH RBC QN AUTO: 23.5 PG (ref 26.8–34.3)
MCHC RBC AUTO-ENTMCNC: 29.2 G/DL (ref 31.4–37.4)
MCV RBC AUTO: 80 FL (ref 82–98)
MONOCYTES # BLD AUTO: 0.98 THOUSAND/ΜL (ref 0.17–1.22)
MONOCYTES NFR BLD AUTO: 12 % (ref 4–12)
NEUTROPHILS # BLD AUTO: 5.76 THOUSANDS/ΜL (ref 1.85–7.62)
NEUTS SEG NFR BLD AUTO: 68 % (ref 43–75)
NRBC BLD AUTO-RTO: 0 /100 WBCS
PLATELET # BLD AUTO: 220 THOUSANDS/UL (ref 149–390)
PMV BLD AUTO: 11.6 FL (ref 8.9–12.7)
POTASSIUM SERPL-SCNC: 3.7 MMOL/L (ref 3.5–5.3)
PROT SERPL-MCNC: 7.9 G/DL (ref 6.4–8.2)
PROTHROMBIN TIME: 16.5 SECONDS (ref 11.6–14.5)
QRS AXIS: 64 DEGREES
QRSD INTERVAL: 76 MS
QT INTERVAL: 246 MS
QTC INTERVAL: 418 MS
RBC # BLD AUTO: 5.71 MILLION/UL (ref 3.88–5.62)
SODIUM SERPL-SCNC: 136 MMOL/L (ref 136–145)
T WAVE AXIS: -64 DEGREES
VENTRICULAR RATE: 174 BPM
WBC # BLD AUTO: 8.4 THOUSAND/UL (ref 4.31–10.16)

## 2022-05-10 PROCEDURE — 93970 EXTREMITY STUDY: CPT

## 2022-05-10 PROCEDURE — 96365 THER/PROPH/DIAG IV INF INIT: CPT

## 2022-05-10 PROCEDURE — 93970 EXTREMITY STUDY: CPT | Performed by: SURGERY

## 2022-05-10 PROCEDURE — 85730 THROMBOPLASTIN TIME PARTIAL: CPT | Performed by: INTERNAL MEDICINE

## 2022-05-10 PROCEDURE — 96375 TX/PRO/DX INJ NEW DRUG ADDON: CPT

## 2022-05-10 PROCEDURE — 80053 COMPREHEN METABOLIC PANEL: CPT

## 2022-05-10 PROCEDURE — 99285 EMERGENCY DEPT VISIT HI MDM: CPT

## 2022-05-10 PROCEDURE — C8924 2D TTE W OR W/O FOL W/CON,FU: HCPCS

## 2022-05-10 PROCEDURE — 85730 THROMBOPLASTIN TIME PARTIAL: CPT

## 2022-05-10 PROCEDURE — 99285 EMERGENCY DEPT VISIT HI MDM: CPT | Performed by: EMERGENCY MEDICINE

## 2022-05-10 PROCEDURE — 84484 ASSAY OF TROPONIN QUANT: CPT

## 2022-05-10 PROCEDURE — 36415 COLL VENOUS BLD VENIPUNCTURE: CPT

## 2022-05-10 PROCEDURE — 99223 1ST HOSP IP/OBS HIGH 75: CPT | Performed by: INTERNAL MEDICINE

## 2022-05-10 PROCEDURE — 82948 REAGENT STRIP/BLOOD GLUCOSE: CPT

## 2022-05-10 PROCEDURE — 71045 X-RAY EXAM CHEST 1 VIEW: CPT

## 2022-05-10 PROCEDURE — 93005 ELECTROCARDIOGRAM TRACING: CPT

## 2022-05-10 PROCEDURE — 85610 PROTHROMBIN TIME: CPT

## 2022-05-10 PROCEDURE — 93010 ELECTROCARDIOGRAM REPORT: CPT | Performed by: INTERNAL MEDICINE

## 2022-05-10 PROCEDURE — 85025 COMPLETE CBC W/AUTO DIFF WBC: CPT

## 2022-05-10 RX ORDER — INSULIN LISPRO 100 [IU]/ML
2-12 INJECTION, SOLUTION INTRAVENOUS; SUBCUTANEOUS
Status: DISCONTINUED | OUTPATIENT
Start: 2022-05-10 | End: 2022-05-11

## 2022-05-10 RX ORDER — DILTIAZEM HYDROCHLORIDE 5 MG/ML
10 INJECTION INTRAVENOUS ONCE
Status: COMPLETED | OUTPATIENT
Start: 2022-05-10 | End: 2022-05-10

## 2022-05-10 RX ORDER — METOPROLOL TARTRATE 50 MG/1
50 TABLET, FILM COATED ORAL EVERY 12 HOURS SCHEDULED
Status: DISCONTINUED | OUTPATIENT
Start: 2022-05-10 | End: 2022-05-11

## 2022-05-10 RX ORDER — PRAVASTATIN SODIUM 80 MG/1
80 TABLET ORAL
Status: DISCONTINUED | OUTPATIENT
Start: 2022-05-10 | End: 2022-05-18 | Stop reason: HOSPADM

## 2022-05-10 RX ORDER — DILTIAZEM HYDROCHLORIDE 5 MG/ML
INJECTION INTRAVENOUS
Status: COMPLETED
Start: 2022-05-10 | End: 2022-05-10

## 2022-05-10 RX ORDER — HEPARIN SODIUM 10000 [USP'U]/100ML
3-20 INJECTION, SOLUTION INTRAVENOUS
Status: DISCONTINUED | OUTPATIENT
Start: 2022-05-10 | End: 2022-05-12

## 2022-05-10 RX ORDER — DILTIAZEM HYDROCHLORIDE 5 MG/ML
15 INJECTION INTRAVENOUS ONCE
Status: COMPLETED | OUTPATIENT
Start: 2022-05-10 | End: 2022-05-10

## 2022-05-10 RX ORDER — HEPARIN SODIUM 1000 [USP'U]/ML
4000 INJECTION, SOLUTION INTRAVENOUS; SUBCUTANEOUS ONCE
Status: COMPLETED | OUTPATIENT
Start: 2022-05-10 | End: 2022-05-10

## 2022-05-10 RX ORDER — ALBUTEROL SULFATE 90 UG/1
2 AEROSOL, METERED RESPIRATORY (INHALATION) EVERY 4 HOURS PRN
Status: DISCONTINUED | OUTPATIENT
Start: 2022-05-10 | End: 2022-05-18 | Stop reason: HOSPADM

## 2022-05-10 RX ORDER — DILTIAZEM HYDROCHLORIDE 5 MG/ML
30 INJECTION INTRAVENOUS ONCE
Status: COMPLETED | OUTPATIENT
Start: 2022-05-10 | End: 2022-05-10

## 2022-05-10 RX ORDER — HEPARIN SODIUM 1000 [USP'U]/ML
4000 INJECTION, SOLUTION INTRAVENOUS; SUBCUTANEOUS
Status: DISCONTINUED | OUTPATIENT
Start: 2022-05-10 | End: 2022-05-12

## 2022-05-10 RX ORDER — HEPARIN SODIUM 1000 [USP'U]/ML
2000 INJECTION, SOLUTION INTRAVENOUS; SUBCUTANEOUS
Status: DISCONTINUED | OUTPATIENT
Start: 2022-05-10 | End: 2022-05-12

## 2022-05-10 RX ORDER — ACETAMINOPHEN 325 MG/1
650 TABLET ORAL EVERY 6 HOURS PRN
Status: DISCONTINUED | OUTPATIENT
Start: 2022-05-10 | End: 2022-05-18 | Stop reason: HOSPADM

## 2022-05-10 RX ADMIN — PERFLUTREN 0.4 ML/MIN: 6.52 INJECTION, SUSPENSION INTRAVENOUS at 17:56

## 2022-05-10 RX ADMIN — DILTIAZEM HYDROCHLORIDE 25 MG: 5 INJECTION INTRAVENOUS at 13:14

## 2022-05-10 RX ADMIN — DILTIAZEM HYDROCHLORIDE 12.5 MG/HR: 5 INJECTION, SOLUTION INTRAVENOUS at 13:48

## 2022-05-10 RX ADMIN — DILTIAZEM HYDROCHLORIDE 10 MG: 5 INJECTION INTRAVENOUS at 13:35

## 2022-05-10 RX ADMIN — DILTIAZEM HYDROCHLORIDE 15 MG/HR: 5 INJECTION, SOLUTION INTRAVENOUS at 22:52

## 2022-05-10 RX ADMIN — PRAVASTATIN SODIUM 80 MG: 80 TABLET ORAL at 18:04

## 2022-05-10 RX ADMIN — HEPARIN SODIUM 4000 UNITS: 1000 INJECTION INTRAVENOUS; SUBCUTANEOUS at 13:58

## 2022-05-10 RX ADMIN — VANCOMYCIN HYDROCHLORIDE 1500 MG: 1 INJECTION, POWDER, LYOPHILIZED, FOR SOLUTION INTRAVENOUS at 17:25

## 2022-05-10 RX ADMIN — HEPARIN SODIUM 11.1 UNITS/KG/HR: 10000 INJECTION, SOLUTION INTRAVENOUS at 13:59

## 2022-05-10 RX ADMIN — DILTIAZEM HYDROCHLORIDE 15 MG: 5 INJECTION INTRAVENOUS at 14:11

## 2022-05-10 RX ADMIN — METOPROLOL TARTRATE 50 MG: 50 TABLET, FILM COATED ORAL at 21:05

## 2022-05-10 NOTE — ASSESSMENT & PLAN NOTE
· Patient is a 45-year-old male a past medical history of cognitive deficit, atrial fibrillation diagnosed in February 2022, hypertension, hyperlipidemia who presented to the ED with palpitations  He was not taking his medications for about a month, unable to refill it    · AFib with RVR secondary to medication noncompliance  · EKG showed AFib with RVR on admission, started on Cardizem drip and heparin drip  · Restart metoprolol tartrate 50 mg b i d   · Continue heparin drip and consider transitioning to Eliquis tomorrow if rate is under control  · Continue Cardizem drip for now  · Limited echo to evaluate ejection fraction

## 2022-05-10 NOTE — ASSESSMENT & PLAN NOTE
Lab Results   Component Value Date    HGBA1C 6 7 (H) 02/25/2022       No results for input(s): POCGLU in the last 72 hours      Blood Sugar Average: Last 72 hrs:  ·  patient on metformin 500 mg daily   · Start sliding scale  · Hypoglycemia protocol  · Diabetic diet

## 2022-05-10 NOTE — ED NOTES
Cardizem, 5mg/hr, 50mg/50mL, initiated now, Dr Destini Winston bedside     Shin Munoz, MDAHU  05/10/22 8161

## 2022-05-10 NOTE — ASSESSMENT & PLAN NOTE
· Patient complaining of leg swelling L>R, left leg with redness  · On PE: Excoriations, erythema, warmth, edema, 1+ pitting edema on the right leg and 2+ pitting edema on the left leg  · Venous Doppler to evaluate for DVT  · Patient has penicillin allergy of unknown severity  Start vancomycin  · Patient could also have a tachy mediated mild heart failure  His last echo 02/25/2022 showed ejection fraction of 60%, unable to evaluate diastolic function  · Chest x-ray showed mild vascular congestion and small bilateral pleural effusions  Patient states since his heart rate is better, his breathing significantly improved and almost back at baseline    Consider Lasix (Cardizem drip was just increased to 15 mL per hour, monitor blood pressure)

## 2022-05-10 NOTE — PROGRESS NOTES
Vancomycin IV Pharmacy-to-Dose Consultation    Yesi Woods is a 56 y o  male who is currently receiving Vancomycin IV with management by the Pharmacy Consult service  Relevant clinical data and objective history reviewed:  Temp Readings from Last 3 Encounters:   05/10/22 97 5 °F (36 4 °C) (Oral)   02/24/22 97 8 °F (36 6 °C) (Oral)   02/26/15 (!) 95 9 °F (35 5 °C)     /95   Pulse 96   Temp 97 5 °F (36 4 °C) (Oral)   Resp 20   Wt 124 kg (272 lb 14 9 oz)   SpO2 96%   BMI 39 16 kg/m²     No intake/output data recorded  Lab Results   Component Value Date/Time    BUN 17 05/10/2022 01:23 PM    BUN 14 03/04/2015 03:16 PM    WBC 8 40 05/10/2022 01:23 PM    WBC 6 07 03/04/2015 03:16 PM    HGB 13 4 05/10/2022 01:23 PM    HGB 15 2 03/04/2015 03:16 PM    HCT 45 9 05/10/2022 01:23 PM    HCT 44 6 03/04/2015 03:16 PM    MCV 80 (L) 05/10/2022 01:23 PM    MCV 84 03/04/2015 03:16 PM     05/10/2022 01:23 PM     03/04/2015 03:16 PM     Creatinine   Date Value Ref Range Status   05/10/2022 1 20 0 60 - 1 30 mg/dL Final     Comment:     Standardized to IDMS reference method   02/25/2022 1 17 0 60 - 1 30 mg/dL Final     Comment:     Standardized to IDMS reference method   03/04/2015 1 00 0 60 - 1 30 mg/dL Final     Comment:     Standardized to IDMS reference method         Vancomycin Assessment:  Indication: skin-soft tissue infection   Renal Function: Scr 1 2; CrCl > 90 ml/min  Days of Therapy: 1  Goal Trough: 15-20 (appropriate for most indications)   Goal AUC(24h): 400-600      Vancomycin Plan:  Dosing: start 1500 mg q8h (15 mg/kg using adjusted body weight)  Next Level: Trough 5/11 @ 1530      Pharmacy will continue to follow closely for s/sx of nephrotoxicity, infusion reactions and appropriateness of therapy  BMP and CBC will be ordered per protocol  We will continue to follow the patient's culture results and clinical progress daily         Bam Andino, PharmD  Pharmacist

## 2022-05-10 NOTE — ED PROVIDER NOTES
History  Chief Complaint   Patient presents with    Atrial Fibrillation     Increased sob and leg swelling, hx afib     Patient is a 45-year-old male with past medical history significant for diabetes, AFib on Eliquis, hypertension presenting to the emergency department with chief complaint of rapid heart rate and increased shortness of breath  Patient states that he has not been taking his medications due to not being able to get a refill  Patient states that he woke up this morning and felt like his heart was racing  His father brought him to the hospital for further evaluation  Patient states he has not taken his Eliquis in over a month  Patient denies any headache, dizziness, nausea, vomiting, diarrhea, constipation, abdominal pain, chest pain, numbness or tingling in any of his extremities  Patient has not taken anything prior to arrival help with the symptoms  Prior to Admission Medications   Prescriptions Last Dose Informant Patient Reported? Taking? albuterol (PROVENTIL HFA,VENTOLIN HFA) 90 mcg/act inhaler   No No   Sig: Inhale 2 puffs every 4 (four) hours as needed for wheezing or shortness of breath   apixaban (Eliquis) 5 mg   No No   Sig: Take 1 tablet (5 mg total) by mouth 2 (two) times a day   metFORMIN (GLUCOPHAGE-XR) 500 mg 24 hr tablet   No No   Sig: Take 1 tablet (500 mg total) by mouth daily with dinner   metoprolol tartrate (LOPRESSOR) 50 mg tablet   No No   Sig: Take 1 tablet (50 mg total) by mouth every 12 (twelve) hours   rosuvastatin (CRESTOR) 10 MG tablet   No No   Sig: Take 1 tablet (10 mg total) by mouth daily      Facility-Administered Medications: None       Past Medical History:   Diagnosis Date    A-fib ([facility] [address] )     Hypertension        History reviewed  No pertinent surgical history  History reviewed  No pertinent family history  I have reviewed and agree with the history as documented      E-Cigarette/Vaping     E-Cigarette/Vaping Substances     Social History Tobacco Use    Smoking status: Never Smoker    Smokeless tobacco: Never Used   Substance Use Topics    Alcohol use: Never    Drug use: Not Currently        Review of Systems   Constitutional: Positive for fatigue  Negative for appetite change, chills and fever  HENT: Negative for ear pain and sore throat  Eyes: Negative for pain and visual disturbance  Respiratory: Positive for shortness of breath  Negative for cough and chest tightness  Cardiovascular: Positive for palpitations and leg swelling  Negative for chest pain  Gastrointestinal: Negative for abdominal pain, constipation, diarrhea, nausea and vomiting  Genitourinary: Negative for dysuria and hematuria  Musculoskeletal: Negative for arthralgias and back pain  Skin: Negative for color change and rash  Neurological: Negative for dizziness, seizures, syncope, weakness, light-headedness, numbness and headaches  Psychiatric/Behavioral: Negative for agitation and behavioral problems  All other systems reviewed and are negative  Physical Exam  ED Triage Vitals   Temperature Pulse Respirations Blood Pressure SpO2   05/10/22 1311 05/10/22 1311 05/10/22 1311 05/10/22 1311 05/10/22 1315   97 5 °F (36 4 °C) (!) 180 18 160/90 93 %      Temp Source Heart Rate Source Patient Position - Orthostatic VS BP Location FiO2 (%)   05/10/22 1311 05/10/22 1311 -- -- --   Oral Monitor         Pain Score       --                    Orthostatic Vital Signs  Vitals:    05/10/22 1318 05/10/22 1326 05/10/22 1400 05/10/22 1415   BP: 145/79 145/79 121/72    Pulse: (!) 133 (!) 118 (!) 120 (!) 126       Physical Exam  Vitals and nursing note reviewed  Constitutional:       Appearance: Normal appearance  He is well-developed  HENT:      Head: Normocephalic and atraumatic        Right Ear: External ear normal       Left Ear: External ear normal       Nose: Nose normal       Mouth/Throat:      Mouth: Mucous membranes are moist    Eyes:      Extraocular Movements: Extraocular movements intact  Conjunctiva/sclera: Conjunctivae normal       Pupils: Pupils are equal, round, and reactive to light  Cardiovascular:      Rate and Rhythm: Tachycardia present  Rhythm irregular  Heart sounds: No murmur heard  Pulmonary:      Effort: Pulmonary effort is normal  No respiratory distress  Breath sounds: Normal breath sounds  Abdominal:      General: Abdomen is flat  Palpations: Abdomen is soft  Tenderness: There is no abdominal tenderness  There is no guarding or rebound  Musculoskeletal:         General: Normal range of motion  Cervical back: Normal range of motion and neck supple  Right lower leg: Edema present  Left lower leg: Edema present  Skin:     General: Skin is warm and dry  Capillary Refill: Capillary refill takes 2 to 3 seconds  Neurological:      General: No focal deficit present  Mental Status: He is alert and oriented to person, place, and time     Psychiatric:         Mood and Affect: Mood normal          Behavior: Behavior normal          ED Medications  Medications   diltiazem (CARDIZEM) 125 mg in sodium chloride 0 9 % 125 mL infusion (15 mg/hr Intravenous Rate/Dose Change 5/10/22 1409)   heparin (porcine) 25,000 units in 0 45% NaCl 250 mL infusion (premix) (11 1 Units/kg/hr × 90 kg (Order-Specific) Intravenous New Bag 5/10/22 1359)   heparin (porcine) injection 4,000 Units (has no administration in time range)   heparin (porcine) injection 2,000 Units (has no administration in time range)   diltiazem (CARDIZEM) injection 30 mg (25 mg Intravenous Given 5/10/22 1314)   heparin (porcine) injection 4,000 Units (4,000 Units Intravenous Given 5/10/22 1358)   diltiazem (CARDIZEM) injection 10 mg (10 mg Intravenous Given 5/10/22 1335)   diltiazem (CARDIZEM) injection 15 mg (15 mg Intravenous Given 5/10/22 1411)       Diagnostic Studies  Results Reviewed     Procedure Component Value Units Date/Time APTT six (6) hours after Heparin bolus/drip initiation or dosing change [[id number]]  (Normal) Collected: 05/10/22 1325    Lab Status: Final result Specimen: Blood from Arm, Right Updated: 05/10/22 1419     PTT 35 seconds     Protime-INR [[id number]]  (Abnormal) Collected: 05/10/22 1325    Lab Status: Final result Specimen: Blood from Arm, Right Updated: 05/10/22 1419     Protime 16 5 seconds      INR 1 39    HS Troponin I 2hr [[id number]]     Lab Status: No result Specimen: Blood     HS Troponin 0hr (reflex protocol) [[id number]]  (Normal) Collected: 05/10/22 1323    Lab Status: Final result Specimen: Blood from Arm, Right Updated: 05/10/22 1356     hs TnI 0hr 6 ng/L     Comprehensive metabolic panel [[id number]]  (Abnormal) Collected: 05/10/22 1323    Lab Status: Final result Specimen: Blood from Arm, Right Updated: 05/10/22 1350     Sodium 136 mmol/L      Potassium 3 7 mmol/L      Chloride 103 mmol/L      CO2 25 mmol/L      ANION GAP 8 mmol/L      BUN 17 mg/dL      Creatinine 1 20 mg/dL      Glucose 129 mg/dL      Calcium 9 3 mg/dL      AST 29 U/L      ALT 27 U/L      Alkaline Phosphatase 94 U/L      Total Protein 7 9 g/dL      Albumin 3 7 g/dL      Total Bilirubin 2 52 mg/dL      eGFR 72 ml/min/1 73sq m     Narrative:      [place] guidelines for Chronic Kidney Disease (CKD):     Stage 1 with normal or high GFR (GFR > 90 mL/min/1 73 square meters)    Stage 2 Mild CKD (GFR = 60-89 mL/min/1 73 square meters)    Stage 3A Moderate CKD (GFR = 45-59 mL/min/1 73 square meters)    Stage 3B Moderate CKD (GFR = 30-44 mL/min/1 73 square meters)    Stage 4 Severe CKD (GFR = 15-29 mL/min/1 73 square meters)    Stage 5 End Stage CKD (GFR <15 mL/min/1 73 square meters)  Note: GFR calculation is accurate only with a steady state creatinine    CBC and differential [[id number]]  (Abnormal) Collected: 05/10/22 1323    Lab Status: Final result Specimen: Blood from Arm, Right Updated: 05/10/22 1331     WBC 8 40 Thousand/uL      RBC 5 71 Million/uL      Hemoglobin 13 4 g/dL      Hematocrit 45 9 %      MCV 80 fL      MCH 23 5 pg      MCHC 29 2 g/dL      RDW 17 5 %      MPV 11 6 fL      Platelets 842 Thousands/uL      nRBC 0 /100 WBCs      Neutrophils Relative 68 %      Immat GRANS % 0 %      Lymphocytes Relative 15 %      Monocytes Relative 12 %      Eosinophils Relative 4 %      Basophils Relative 1 %      Neutrophils Absolute 5 76 Thousands/µL      Immature Grans Absolute 0 03 Thousand/uL      Lymphocytes Absolute 1 25 Thousands/µL      Monocytes Absolute 0 98 Thousand/µL      Eosinophils Absolute 0 34 Thousand/µL      Basophils Absolute 0 04 Thousands/µL                  XR chest 1 view portable    (Results Pending)         Procedures  Procedures      ED Course  ED Course as of 05/10/22 1429   Tue May 10, 2022   1333 Hemoglobin: 13 4   1333 Blood Pressure: 145/79   1333 Pulse(!): 118   1333 Respirations: 20   1333 SpO2: 92 %   1336 Blood Pressure: 160/90   1336 Temperature: 97 5 °F (36 4 °C)   1336 Temp Source: Oral   1336 Pulse(!): 180   1336 Respirations: 18  Initial presentation to the room the patient was in AFib RVR with rates up to high of 190s  Patient was asymptomatic during the event and blood pressure was 145/76  Patient states that he has paroxysmal AFib  He woke up this morning and felt like his heart was racing  We placed him on cardiac pads and pulsed and 30 mg of Cardizem to try to break his rate  We started him on a Cardizem drip of 5 shortly after opted to 10 with a re-dose of 10 of Cardizem for continued elevated heart rate  Patient states he is feeling much better since getting the medications will admit patient for further workup  Patient has no questions or concerns at this time will frequently re-evaluate  1358 hs TnI 0hr: 6   1358 Comprehensive metabolic panel(!)   2184 Reached out to [facility] for admission  1409 Will Re bolus patient Cardizem     1427 PROTIME(!): 16 5   1427 POCT INR(!): 1 90 2019 Patient is feeling much better  Patient is admitted to [facility]  Patient has no questions or concerns at this time  SBIRT 20yo+      Most Recent Value   SBIRT (24 yo +)    In order to provide better care to our patients, we are screening all of our patients for alcohol and drug use  Would it be okay to ask you these screening questions? Yes Filed at: 05/10/2022 1323   Initial Alcohol Screen: US AUDIT-C     1  How often do you have a drink containing alcohol? 0 Filed at: 05/10/2022 1323   2  How many drinks containing alcohol do you have on a typical day you are drinking? 0 Filed at: 05/10/2022 1323   3a  Male UNDER 65: How often do you have five or more drinks on one occasion? 0 Filed at: 05/10/2022 1323   Audit-C Score 0 Filed at: 05/10/2022 1323   OLIMPIA: How many times in the past year have you    Used an illegal drug or used a prescription medication for non-medical reasons? Never Filed at: 05/10/2022 1323                MDM    Disposition  Final diagnoses:   Atrial fibrillation with rapid ventricular response ([address] )   Palpitations     Time reflects when diagnosis was documented in both MDM as applicable and the Disposition within this note     Time User Action Codes Description Comment    5/10/2022  2:27 PM Catalino Pringle Add [I48 91] Atrial fibrillation with rapid ventricular response ([address] )     5/10/2022  2:27 PM Catalino Pringle Add [R00 2] Palpitations       ED Disposition     ED Disposition Condition Date/Time Comment    Admit Stable Tue May 10, 2022  2:28 PM Case was discussed with Karishma Joiner  106 and the patient's admission status was agreed to be inpatient to the service of Dr Bhupinder Moctezuma    None         Patient's Medications   Discharge Prescriptions    No medications on file     No discharge procedures on file  PDMP Review     None           ED Provider  Attending physically available and evaluated [place]   I managed the patient along with the ED Attending      Electronically Signed by         Kassandra Ruffin DO  05/10/22 4516

## 2022-05-10 NOTE — PLAN OF CARE
Problem: Potential for Falls  Goal: Patient will remain free of falls  Description: INTERVENTIONS:  - Educate patient/family on patient safety including physical limitations  - Instruct patient to call for assistance with activity   - Consult OT/PT to assist with strengthening/mobility   - Keep Call bell within reach  - Keep bed low and locked with side rails adjusted as appropriate  - Keep care items and personal belongings within reach  - Initiate and maintain comfort rounds  - Make Fall Risk Sign visible to staff  - Apply yellow socks and bracelet for high fall risk patients  - Consider moving patient to room near nurses station  Outcome: Progressing     Problem: PAIN - ADULT  Goal: Verbalizes/displays adequate comfort level or baseline comfort level  Description: Interventions:  - Encourage patient to monitor pain and request assistance  - Assess pain using appropriate pain scale  - Administer analgesics based on type and severity of pain and evaluate response  - Implement non-pharmacological measures as appropriate and evaluate response  - Consider cultural and social influences on pain and pain management  - Notify physician/advanced practitioner if interventions unsuccessful or patient reports new pain  Outcome: Progressing     Problem: INFECTION - ADULT  Goal: Absence or prevention of progression during hospitalization  Description: INTERVENTIONS:  - Assess and monitor for signs and symptoms of infection  - Monitor lab/diagnostic results  - Monitor all insertion sites, i e  indwelling lines, tubes, and drains  - Monitor endotracheal if appropriate and nasal secretions for changes in amount and color  - [place] appropriate cooling/warming therapies per order  - Administer medications as ordered  - Instruct and encourage patient and family to use good hand hygiene technique  - Identify and instruct in appropriate isolation precautions for identified infection/condition  Outcome: Progressing  Goal: Absence of fever/infection during neutropenic period  Description: INTERVENTIONS:  - Monitor WBC    Outcome: Progressing     Problem: SAFETY ADULT  Goal: Patient will remain free of falls  Description: INTERVENTIONS:  - Educate patient/family on patient safety including physical limitations  - Instruct patient to call for assistance with activity   - Consult OT/PT to assist with strengthening/mobility   - Keep Call bell within reach  - Keep bed low and locked with side rails adjusted as appropriate  - Keep care items and personal belongings within reach  - Initiate and maintain comfort rounds  - Make Fall Risk Sign visible to staff  - Apply yellow socks and bracelet for high fall risk patients  - Consider moving patient to room near nurses station  Outcome: Progressing  Goal: Maintain or return to baseline ADL function  Description: INTERVENTIONS:  -  Assess patient's ability to carry out ADLs; assess patient's baseline for ADL function and identify physical deficits which impact ability to perform ADLs (bathing, care of mouth/teeth, toileting, grooming, dressing, etc )  - Assess/evaluate cause of self-care deficits   - Assess range of motion  - Assess patient's mobility; develop plan if impaired  - Assess patient's need for assistive devices and provide as appropriate  - Encourage maximum independence but intervene and supervise when necessary  - Involve family in performance of ADLs  - Assess for home care needs following discharge   - Consider OT consult to assist with ADL evaluation and planning for discharge  - Provide patient education as appropriate  Outcome: Progressing  Goal: Maintains/Returns to pre admission functional level  Description: INTERVENTIONS:  - Perform BMAT or MOVE assessment daily    - Set and communicate daily mobility goal to care team and patient/family/caregiver     - Collaborate with rehabilitation services on mobility goals if consulted  - Out of bed for toileting  - Record patient progress and toleration of activity level   Outcome: Progressing     Problem: DISCHARGE PLANNING  Goal: Discharge to home or other facility with appropriate resources  Description: INTERVENTIONS:  - Identify barriers to discharge w/patient and caregiver  - Arrange for needed discharge resources and transportation as appropriate  - Identify discharge learning needs (meds, wound care, etc )  - Arrange for interpretive services to assist at discharge as needed  - Refer to Case Management Department for coordinating discharge planning if the patient needs post-hospital services based on physician/advanced practitioner order or complex needs related to functional status, cognitive ability, or social support system  Outcome: Progressing     Problem: Knowledge Deficit  Goal: Patient/family/caregiver demonstrates understanding of disease process, treatment plan, medications, and discharge instructions  Description: Complete learning assessment and assess knowledge base    Interventions:  - Provide teaching at level of understanding  - Provide teaching via preferred learning methods  Outcome: Progressing

## 2022-05-10 NOTE — ED ATTENDING ATTESTATION
5/10/2022  IRamya MD, saw and evaluated the patient  I have discussed the patient with the resident/non-physician practitioner and agree with the resident's/non-physician practitioner's findings, Plan of Care, and MDM as documented in the resident's/non-physician practitioner's note, except where noted  All available labs and Radiology studies were reviewed  I was present for key portions of any procedure(s) performed by the resident/non-physician practitioner and I was immediately available to provide assistance  At this point I agree with the current assessment done in the Emergency Department  I have conducted an independent evaluation of this patient a history and physical is as follows:    Patient reports he has a history of paroxysmal atrial fibrillation  The patient states he was started on medications including Eliquis and that his symptoms were well controlled but he ran out of his medicines approximately 1 month ago and has not been taking any of his medicines in that time period  This morning at approximately 7:28 a m  The patient noticed that his heart was beating fast and he felt generalized weakness  When the symptoms persisted he decided to come to the emergency department  The patient denies any chest pain, pressure, or discomfort  The patient denies any back, arm, neck pain  The patient denies any syncope or near syncope  The patient denies nausea, vomiting, diaphoresis and shortness of breath  The only symptom the patient endorses right now is weakness when he tries to walk and fast heart rate sensation  The patient was able to ambulate without assistance  Physical exam demonstrates a male no acute distress  HEENT exam is normal   Lungs are clear with equal breath sounds  The heart was tachycardic and irregular  The chest was nontender  The abdomen is soft and nontender  Extremities are symmetric and nontender  There is no focal neurologic deficit    ED Course Critical Care Time  Procedures

## 2022-05-10 NOTE — H&P
Allan Davalos 1975, 56 y o  male MRN: [id number]  [facility] Encounter: [id number]  Primary Care Provider: Lottie Brown MD (Inactive)   Date and time admitted to hospital: 5/10/2022  1:04 PM    * Atrial fibrillation with rapid ventricular response [facility])  Assessment & Plan  · Patient is a 55-year-old male a past medical history of cognitive deficit, atrial fibrillation diagnosed in February 2022, hypertension, hyperlipidemia who presented to the ED with palpitations  He was not taking his medications for about a month, unable to refill it  · AFib with RVR secondary to medication noncompliance  · EKG showed AFib with RVR on admission, started on Cardizem drip and heparin drip  · Restart metoprolol tartrate 50 mg b i d   · Continue heparin drip and consider transitioning to Eliquis tomorrow if rate is under control  · Continue Cardizem drip for now  · Limited echo to evaluate ejection fraction    Leg swelling  Assessment & Plan  · Patient complaining of leg swelling L>R, left leg with redness  · On PE: Excoriations, erythema, warmth, edema, 1+ pitting edema on the right leg and 2+ pitting edema on the left leg  · Venous Doppler to evaluate for DVT  · Patient has penicillin allergy of unknown severity  Start vancomycin  · Patient could also have a tachy mediated mild heart failure  His last echo 02/25/2022 showed ejection fraction of 60%, unable to evaluate diastolic function  · Chest x-ray showed mild vascular congestion and small bilateral pleural effusions  Patient states since his heart rate is better, his breathing significantly improved and almost back at baseline    Consider Lasix (Cardizem drip was just increased to 15 mL per hour, monitor blood pressure)    Type 2 diabetes mellitus, without long-term current use of insulin [facility])  Assessment & Plan  Lab Results   Component Value Date    HGBA1C 6 7 (H) 02/25/2022       No results for input(s): POCGLU in the last 72 hours  Blood Sugar Average: Last 72 hrs:  ·  patient on metformin 500 mg daily   · Start sliding scale  · Hypoglycemia protocol  · Diabetic diet    Hyperlipidemia  Assessment & Plan  · Continue statin    Cognitive deficits  Assessment & Plan  · Supportive care    Asthma  Assessment & Plan  · Not in acute exacerbation  · Continue albuterol inhaler p r n  VTE Pharmacologic Prophylaxis: VTE Score: 5 High Risk (Score >/= 5) - Pharmacological DVT Prophylaxis Ordered: heparin drip  Sequential Compression Devices Ordered  Code Status: Level 1 - Full Code   Discussion with family: Updated  (father) via phone  Anticipated Length of Stay: Patient will be admitted on an inpatient basis with an anticipated length of stay of greater than 2 midnights secondary to AFib with RVR  Total Time for Visit, including Counseling / Coordination of Care: 60 minutes Greater than 50% of this total time spent on direct patient counseling and coordination of care  Chief Complaint:  Palpitation, left leg swelling    History of Present Illness:  Jose Elizabeth is a 56 y o  male with a PMH of type 2 diabetes, atrial fibrillation, hypertension, hyperlipidemia, cognitive deficit who presents with palpitation  Patient lives in [place] [address] due to cognitive deficit  Patient was diagnosed with AFib in February 2022, started on metoprolol tartrate 50 mg b i d  And Eliquis 5 mg b i d   For the past month patient did not refill his medications  He woke up this morning and he felt his heart was racing  He denies any headache, dizziness, lightheadedness, chest pain, nausea, vomiting diarrhea constipation  Denies any weakness or numbness in his arms or legs  He noticed swelling of his left leg  Review of Systems:  Review of Systems   Constitutional: Negative for activity change, appetite change, fatigue and fever  HENT: Negative  Eyes: Negative      Respiratory: Negative for chest tightness, shortness of breath and wheezing  Cardiovascular: Positive for palpitations and leg swelling  Negative for chest pain  Gastrointestinal: Negative for abdominal distention, abdominal pain, constipation, diarrhea, nausea and vomiting  Endocrine: Negative  Genitourinary: Negative  Musculoskeletal: Negative  Skin:        Left leg redness   Neurological: Negative  Hematological: Negative  Psychiatric/Behavioral: Negative  Past Medical and Surgical History:   Past Medical History:   Diagnosis Date    [facility]     Hypertension        History reviewed  No pertinent surgical history  Meds/Allergies:  Prior to Admission medications    Medication Sig Start Date End Date Taking? Authorizing Provider   albuterol (PROVENTIL HFA,VENTOLIN HFA) 90 mcg/act inhaler Inhale 2 puffs every 4 (four) hours as needed for wheezing or shortness of breath 2/25/22   Leone Dakin, MD   apixaban (Eliquis) 5 mg Take 1 tablet (5 mg total) by mouth 2 (two) times a day 2/25/22 3/27/22  Leone Dakin, MD   metFORMIN (GLUCOPHAGE-XR) 500 mg 24 hr tablet Take 1 tablet (500 mg total) by mouth daily with dinner 2/25/22 3/27/22  Leone Dakin, MD   metoprolol tartrate (LOPRESSOR) 50 mg tablet Take 1 tablet (50 mg total) by mouth every 12 (twelve) hours 2/25/22 3/27/22  Leone Dakin, MD   rosuvastatin (CRESTOR) 10 MG tablet Take 1 tablet (10 mg total) by mouth daily 2/25/22 3/27/22  Leone Dakin, MD     I have reviewed home medications with patient personally  Allergies: Allergies   Allergen Reactions    Penicillins Other (See Comments)     unknown       Social History:  Marital Status: Single   Substance Use History:   Social History     Substance and Sexual Activity   Alcohol Use Never     Social History     Tobacco Use   Smoking Status Never Smoker   Smokeless Tobacco Never Used     Social History     Substance and Sexual Activity   Drug Use Not Currently       Family History:  History reviewed  No pertinent family history  Physical Exam:     Vitals:   Blood Pressure: 129/95 (05/10/22 1545)  Pulse: 96 (05/10/22 1545)  Temperature: 97 5 °F (36 4 °C) (05/10/22 1311)  Temp Source: Oral (05/10/22 1311)  Respirations: 20 (05/10/22 1415)  Weight - Scale: 124 kg (272 lb 14 9 oz) (05/10/22 1311)  SpO2: 96 % (05/10/22 1545)    Physical Exam  Constitutional:       General: He is not in acute distress  Appearance: He is obese  HENT:      Head: Atraumatic  Cardiovascular:      Rate and Rhythm: Tachycardia present  Rhythm irregular  Heart sounds: No murmur heard  No friction rub  No gallop  Pulmonary:      Effort: Pulmonary effort is normal  No respiratory distress  Breath sounds: Normal breath sounds  No wheezing  Abdominal:      General: Bowel sounds are normal  There is no distension  Palpations: Abdomen is soft  Tenderness: There is no abdominal tenderness  Musculoskeletal:      Cervical back: Neck supple  Left lower leg: Edema present  Skin:     Comments: Left legs from knees down 2+ pitting edema, excoriations, redness, warmth   Neurological:      General: No focal deficit present  Mental Status: He is alert     Psychiatric:         Mood and Affect: Mood normal           Additional Data:     Lab Results:  Results from last 7 days   Lab Units 05/10/22  1323   WBC Thousand/uL 8 40   HEMOGLOBIN g/dL 13 4   HEMATOCRIT % 45 9   PLATELETS Thousands/uL 220   NEUTROS PCT % 68   LYMPHS PCT % 15   MONOS PCT % 12   EOS PCT % 4     Results from last 7 days   Lab Units 05/10/22  1323   SODIUM mmol/L 136   POTASSIUM mmol/L 3 7   CHLORIDE mmol/L 103   CO2 mmol/L 25   BUN mg/dL 17   CREATININE mg/dL 1 20   ANION GAP mmol/L 8   CALCIUM mg/dL 9 3   ALBUMIN g/dL 3 7   TOTAL BILIRUBIN mg/dL 2 52*   ALK PHOS U/L 94   ALT U/L 27   AST U/L 29   GLUCOSE RANDOM mg/dL 129     Results from last 7 days   Lab Units 05/10/22  1325   INR  1 39*                   Imaging:   XR chest 1 view portable   Final Result by Paul Aden MD (05/10 1526)      Borderline cardiomegaly  Mild vascular congestion  Bilateral pleural effusions  Workstation performed: [id number]         VAS lower limb venous duplex study, complete bilateral    (Results Pending)     XR chest 1 view portable   Final Result by Paul Aden MD (05/10 1526)      Borderline cardiomegaly  Mild vascular congestion  Bilateral pleural effusions  Workstation performed: [id number]         VAS lower limb venous duplex study, complete bilateral    (Results Pending)       EKG and Other Studies Reviewed on Admission:   · EKG: Atrial fibrillation  HR 170s  ** Please Note: This note has been constructed using a voice recognition system   **

## 2022-05-11 ENCOUNTER — PATIENT OUTREACH (OUTPATIENT)
Dept: CASE MANAGEMENT | Facility: OTHER | Age: 47
End: 2022-05-11

## 2022-05-11 LAB
ANION GAP SERPL CALCULATED.3IONS-SCNC: 10 MMOL/L (ref 4–13)
APTT PPP: 70 SECONDS (ref 23–37)
BUN SERPL-MCNC: 19 MG/DL (ref 5–25)
CALCIUM SERPL-MCNC: 8.7 MG/DL (ref 8.3–10.1)
CHLORIDE SERPL-SCNC: 104 MMOL/L (ref 100–108)
CO2 SERPL-SCNC: 20 MMOL/L (ref 21–32)
CREAT SERPL-MCNC: 1.23 MG/DL (ref 0.6–1.3)
GFR SERPL CREATININE-BSD FRML MDRD: 69 ML/MIN/1.73SQ M
GLUCOSE SERPL-MCNC: 155 MG/DL (ref 65–140)
GLUCOSE SERPL-MCNC: 89 MG/DL (ref 65–140)
MAGNESIUM SERPL-MCNC: 2.1 MG/DL (ref 1.6–2.6)
POTASSIUM SERPL-SCNC: 3.9 MMOL/L (ref 3.5–5.3)
SL CV LV EF: 40
SODIUM SERPL-SCNC: 134 MMOL/L (ref 136–145)
TSH SERPL DL<=0.05 MIU/L-ACNC: 5.96 UIU/ML (ref 0.45–4.5)

## 2022-05-11 PROCEDURE — 83735 ASSAY OF MAGNESIUM: CPT | Performed by: HOSPITALIST

## 2022-05-11 PROCEDURE — 80048 BASIC METABOLIC PNL TOTAL CA: CPT | Performed by: HOSPITALIST

## 2022-05-11 PROCEDURE — 99223 1ST HOSP IP/OBS HIGH 75: CPT | Performed by: INTERNAL MEDICINE

## 2022-05-11 PROCEDURE — 93321 DOPPLER ECHO F-UP/LMTD STD: CPT | Performed by: INTERNAL MEDICINE

## 2022-05-11 PROCEDURE — 82948 REAGENT STRIP/BLOOD GLUCOSE: CPT

## 2022-05-11 PROCEDURE — 84443 ASSAY THYROID STIM HORMONE: CPT | Performed by: HOSPITALIST

## 2022-05-11 PROCEDURE — 87081 CULTURE SCREEN ONLY: CPT | Performed by: INTERNAL MEDICINE

## 2022-05-11 PROCEDURE — 85730 THROMBOPLASTIN TIME PARTIAL: CPT | Performed by: HOSPITALIST

## 2022-05-11 PROCEDURE — 99233 SBSQ HOSP IP/OBS HIGH 50: CPT | Performed by: INTERNAL MEDICINE

## 2022-05-11 PROCEDURE — 93325 DOPPLER ECHO COLOR FLOW MAPG: CPT | Performed by: INTERNAL MEDICINE

## 2022-05-11 PROCEDURE — 93308 TTE F-UP OR LMTD: CPT | Performed by: INTERNAL MEDICINE

## 2022-05-11 RX ORDER — DIGOXIN 125 MCG
125 TABLET ORAL DAILY
Status: DISCONTINUED | OUTPATIENT
Start: 2022-05-12 | End: 2022-05-14

## 2022-05-11 RX ORDER — METOPROLOL TARTRATE 50 MG/1
50 TABLET, FILM COATED ORAL EVERY 8 HOURS SCHEDULED
Status: DISCONTINUED | OUTPATIENT
Start: 2022-05-11 | End: 2022-05-13

## 2022-05-11 RX ORDER — FUROSEMIDE 10 MG/ML
20 INJECTION INTRAMUSCULAR; INTRAVENOUS ONCE
Status: COMPLETED | OUTPATIENT
Start: 2022-05-11 | End: 2022-05-11

## 2022-05-11 RX ORDER — DIGOXIN 0.25 MG/ML
250 INJECTION INTRAMUSCULAR; INTRAVENOUS EVERY 6 HOURS
Status: COMPLETED | OUTPATIENT
Start: 2022-05-11 | End: 2022-05-12

## 2022-05-11 RX ORDER — METOPROLOL TARTRATE 5 MG/5ML
2.5 INJECTION INTRAVENOUS ONCE
Status: COMPLETED | OUTPATIENT
Start: 2022-05-11 | End: 2022-05-11

## 2022-05-11 RX ADMIN — DIGOXIN 250 MCG: 0.25 INJECTION INTRAMUSCULAR; INTRAVENOUS at 17:24

## 2022-05-11 RX ADMIN — DIGOXIN 250 MCG: 0.25 INJECTION INTRAMUSCULAR; INTRAVENOUS at 12:26

## 2022-05-11 RX ADMIN — VANCOMYCIN HYDROCHLORIDE 1500 MG: 1 INJECTION, POWDER, LYOPHILIZED, FOR SOLUTION INTRAVENOUS at 00:45

## 2022-05-11 RX ADMIN — DILTIAZEM HYDROCHLORIDE 15 MG/HR: 5 INJECTION, SOLUTION INTRAVENOUS at 08:33

## 2022-05-11 RX ADMIN — ALBUTEROL SULFATE 2 PUFF: 90 AEROSOL, METERED RESPIRATORY (INHALATION) at 02:40

## 2022-05-11 RX ADMIN — METOPROLOL TARTRATE 50 MG: 50 TABLET, FILM COATED ORAL at 22:08

## 2022-05-11 RX ADMIN — METOPROLOL TARTRATE 2.5 MG: 1 INJECTION, SOLUTION INTRAVENOUS at 12:13

## 2022-05-11 RX ADMIN — HEPARIN SODIUM 11.1 UNITS/KG/HR: 10000 INJECTION, SOLUTION INTRAVENOUS at 12:41

## 2022-05-11 RX ADMIN — METOPROLOL TARTRATE 50 MG: 50 TABLET, FILM COATED ORAL at 08:38

## 2022-05-11 RX ADMIN — METOPROLOL TARTRATE 50 MG: 50 TABLET, FILM COATED ORAL at 13:01

## 2022-05-11 RX ADMIN — PRAVASTATIN SODIUM 80 MG: 80 TABLET ORAL at 16:45

## 2022-05-11 RX ADMIN — DILTIAZEM HYDROCHLORIDE 15 MG/HR: 5 INJECTION, SOLUTION INTRAVENOUS at 16:46

## 2022-05-11 RX ADMIN — FUROSEMIDE 20 MG: 10 INJECTION, SOLUTION INTRAMUSCULAR; INTRAVENOUS at 12:12

## 2022-05-11 NOTE — CASE MANAGEMENT
Case Management Assessment    Patient name Manjeet Ernst  [facility] [address] [facility] [id number]  : 1975 Date 2022       Current Admission Date: 5/10/2022  Current Admission Diagnosis:Atrial fibrillation with rapid ventricular response [facility])   Patient Active Problem List    Diagnosis Date Noted    Hyperlipidemia 05/10/2022    Leg swelling 05/10/2022    Hypertension 2022    Asthma 2022    Atrial fibrillation with rapid ventricular response ([facility] ) 2022    Elevated liver enzymes 2022    Elevated d-dimer 2022    [facility] to [facility] 2022    Bilateral pleural effusion 2022    Hiatal hernia 2022    Dyspnea on minimal exertion 2022    Obesity 2022    Cognitive deficits 2022    Cardiomegaly 2022    Diverticulosis of colon without diverticulitis 2022    Type 2 diabetes mellitus, without long-term current use of insulin ([facility] ) 2022      LOS (days): 1  Geometric Mean LOS (GMLOS) (days): 2 40  Days to GMLOS:1 4     OBJECTIVE:    Risk of Unplanned Readmission Score: 10 79         Current admission status: Inpatient       Preferred Pharmacy:   [address], [address] [place] [address]  Phone: [phone] Fax: [phone]    [id number], [address] [place] [address], [address], [address] [place] [address]  Phone: [phone] Fax: [address], [address] [facility] 94 [facility] [address] [facility]  Phone: [phone] Fax: [phone]    Primary Care Provider: Alis Otero MD (Inactive)    Primary Insurance: MEDICARE  Secondary Insurance: [address]:  [facility], [address] [place] [address]   Primary Phone: [phone] (Mobile)               Advance Directives  Does patient have a Health Care POA?: No  Does patient currently have a Health Care decision maker?: Yes, please see Health Care Proxy section  Does patient have Advance Directives?: No  Primary Contact: Alonso Cobb    Readmission Root Cause  30 Day Readmission: No    Patient Information  Admitted from[de-identified] Home  Mental Status: Alert  During Assessment patient was accompanied by: Not accompanied during assessment  Assessment information provided by[de-identified] Patient  Primary Caregiver: Self  Support Systems: Parent, Psychiatrist, [address] [place] [address] of Residence: [address] [place] [address] do you live in?: [facility] entry access options   Select all that apply : No steps to enter home  Type of Current Residence: Apartment  Floor Level: 1  Upon entering residence, is there a bedroom on the main floor (no further steps)?: Yes  Upon entering residence, is there a bathroom on the main floor (no further steps)?: Yes  In the last 12 months, was there a time when you were not able to pay the mortgage or rent on time?: No  In the last 12 months, how many places have you lived?: 1  In the last 12 months, was there a time when you did not have a steady place to sleep or slept in a shelter (including now)?: No  Homeless/housing insecurity resource given?: N/A  Living Arrangements: Other (Comment) (has roommates at [employer])  Is patient a ?: No    Activities of Daily Living Prior to Admission  Functional Status: Independent  Completes ADLs independently?: Yes  Ambulates independently?: Yes  Does patient use assisted devices?: No  Does patient currently own DME?: No  Does patient have a history of Outpatient Therapy (PT/OT)?: No  Does the patient have a history of Short-Term Rehab?: No  Does patient have a history of HHC?: No  Does patient currently have [address]?: No    Patient Information Continued  Income Source: SSI/SSD  Within the past 12 months, you worried that your food would run out before you got the money to buy more : Never true  Within the past 12 months, the food you bought just didn't last and you didn't have money to get more : Never true  Food insecurity resource given?: N/A  Does patient receive dialysis treatments?: No  Does patient have a history of substance abuse?: No  Does patient have a history of Mental Health Diagnosis? :  (pt unsure of diagnosis but says he has psychiatrist and therapist at [facility])      Means of [facility] Insurance of Transport to [facility][de-identified] [employer]  In the past 12 months, has lack of transportation kept you from medical appointments or from getting medications?: No  In the past 12 months, has lack of transportation kept you from meetings, work, or from getting things needed for daily living?: No  Was application for public transport provided?: N/A

## 2022-05-11 NOTE — PLAN OF CARE
Problem: Potential for Falls  Goal: Patient will remain free of falls  Description: INTERVENTIONS:  - Educate patient/family on patient safety including physical limitations  - Instruct patient to call for assistance with activity   - Consult OT/PT to assist with strengthening/mobility   - Keep Call bell within reach  - Keep bed low and locked with side rails adjusted as appropriate  - Keep care items and personal belongings within reach  - Initiate and maintain comfort rounds  - Make Fall Risk Sign visible to staff  - Apply yellow socks and bracelet for high fall risk patients  - Consider moving patient to room near nurses station  Outcome: Progressing     Problem: PAIN - ADULT  Goal: Verbalizes/displays adequate comfort level or baseline comfort level  Description: Interventions:  - Encourage patient to monitor pain and request assistance  - Assess pain using appropriate pain scale  - Administer analgesics based on type and severity of pain and evaluate response  - Implement non-pharmacological measures as appropriate and evaluate response  - Consider cultural and social influences on pain and pain management  - Notify physician/advanced practitioner if interventions unsuccessful or patient reports new pain  Outcome: Progressing     Problem: INFECTION - ADULT  Goal: Absence or prevention of progression during hospitalization  Description: INTERVENTIONS:  - Assess and monitor for signs and symptoms of infection  - Monitor lab/diagnostic results  - Monitor all insertion sites, i e  indwelling lines, tubes, and drains  - Monitor endotracheal if appropriate and nasal secretions for changes in amount and color  - [place] appropriate cooling/warming therapies per order  - Administer medications as ordered  - Instruct and encourage patient and family to use good hand hygiene technique  - Identify and instruct in appropriate isolation precautions for identified infection/condition  Outcome: Progressing  Goal: Absence of fever/infection during neutropenic period  Description: INTERVENTIONS:  - Monitor WBC    Outcome: Progressing     Problem: SAFETY ADULT  Goal: Patient will remain free of falls  Description: INTERVENTIONS:  - Educate patient/family on patient safety including physical limitations  - Instruct patient to call for assistance with activity   - Consult OT/PT to assist with strengthening/mobility   - Keep Call bell within reach  - Keep bed low and locked with side rails adjusted as appropriate  - Keep care items and personal belongings within reach  - Initiate and maintain comfort rounds  - Make Fall Risk Sign visible to staff  - Apply yellow socks and bracelet for high fall risk patients  - Consider moving patient to room near nurses station  Outcome: Progressing  Goal: Maintain or return to baseline ADL function  Description: INTERVENTIONS:  -  Assess patient's ability to carry out ADLs; assess patient's baseline for ADL function and identify physical deficits which impact ability to perform ADLs (bathing, care of mouth/teeth, toileting, grooming, dressing, etc )  - Assess/evaluate cause of self-care deficits   - Assess range of motion  - Assess patient's mobility; develop plan if impaired  - Assess patient's need for assistive devices and provide as appropriate  - Encourage maximum independence but intervene and supervise when necessary  - Involve family in performance of ADLs  - Assess for home care needs following discharge   - Consider OT consult to assist with ADL evaluation and planning for discharge  - Provide patient education as appropriate  Outcome: Progressing  Goal: Maintains/Returns to pre admission functional level  Description: INTERVENTIONS:  - Perform BMAT or MOVE assessment daily    - Set and communicate daily mobility goal to care team and patient/family/caregiver  - Collaborate with rehabilitation services on mobility goals if consulted  - Perform Range of Motion 2 times a day    - Reposition patient every 2 hours  - Dangle patient 2 times a day  - Stand patient 2 times a day  - Ambulate patient 2 times a day  - Out of bed to chair 2 times a day   - Out of bed for meals 2 times a day  - Out of bed for toileting  - Record patient progress and toleration of activity level   Outcome: Progressing     Problem: DISCHARGE PLANNING  Goal: Discharge to home or other facility with appropriate resources  Description: INTERVENTIONS:  - Identify barriers to discharge w/patient and caregiver  - Arrange for needed discharge resources and transportation as appropriate  - Identify discharge learning needs (meds, wound care, etc )  - Arrange for interpretive services to assist at discharge as needed  - Refer to Case Management Department for coordinating discharge planning if the patient needs post-hospital services based on physician/advanced practitioner order or complex needs related to functional status, cognitive ability, or social support system  Outcome: Progressing     Problem: Knowledge Deficit  Goal: Patient/family/caregiver demonstrates understanding of disease process, treatment plan, medications, and discharge instructions  Description: Complete learning assessment and assess knowledge base    Interventions:  - Provide teaching at level of understanding  - Provide teaching via preferred learning methods  Outcome: Progressing

## 2022-05-11 NOTE — ED PROCEDURE NOTE
PROCEDURE  CriticalCare Time  Performed by: Jose Rodríguez MD  Authorized by: Jose Rodríguez MD     Critical care provider statement:     Critical care time (minutes):  35    Critical care time was exclusive of:  Separately billable procedures and treating other patients and teaching time    Critical care was necessary to treat or prevent imminent or life-threatening deterioration of the following conditions:  Cardiac failure    Critical care was time spent personally by me on the following activities:  Obtaining history from patient or surrogate, development of treatment plan with patient or surrogate, evaluation of patient's response to treatment, examination of patient, ordering and review of laboratory studies, ordering and review of radiographic studies and re-evaluation of patient's condition         Jose Rodríguez MD  05/10/22 4624

## 2022-05-11 NOTE — CONSULTS
Vancomycin Pharmacy Consult      Vancomycin has been discontinued; Pharmacy will sign off now  Thank you for involving us in this patient's care  Please do not hesitate to reach back out to Pharmacy  Tai Gutiérrez PharmD  Internal Medicine Clinical Pharmacist Specialist  [phone]  [facility]

## 2022-05-11 NOTE — CASE MANAGEMENT
Case Management Progress Note    Patient name Cintia Duffy  Location [facility] 508/[facility] 048-45 MRN [id number]  : 1975 Date 2022       LOS (days): 1  Geometric Mean LOS (GMLOS) (days): 2 40  Days to GMLOS:1 4        OBJECTIVE:        Current admission status: Inpatient  Preferred Pharmacy:   [address], [address] [facility] [address] [facility]  Phone: [phone] Fax: [phone]    [facility] [id number], [address] [facility], [address] [place] [address] [address] [facility]  Phone: [phone] Fax: En[address], [place] [address] [facility] [address] [address] [facility] [address] [address]  Phone: [phone] Fax: [phone]    Primary Care Provider: Gaudencio Friedman MD (Inactive)    Primary Insurance: MEDICARE  Secondary Insurance: [address]    PROGRESS NOTE: Spoke to patient's father by phone  He lives in the [place] [address] so is not available to transport for followup  Patient had been unable to get to previous PCP and did not know how to get new prescriptions  Father says if patient has appointments written he can use the bus to get there if he is physically able to walk around  Patient has no casemanager or support staff at [facility]

## 2022-05-11 NOTE — ASSESSMENT & PLAN NOTE
 Presented with c/o Palpitations and Dyspnea; currently rate is not controlled   Patient reports he is running out of Eliquis for a few weeks  He did not recall taking lopressor at home   EKG showed Afib with RVR   [id number]= 2 (HTN, DM)   ECHO on Feb showed normal EF and no significant valvular disease  Repeat Echo on this admission EF 40%   TSH 5 9   Lab work: Mg - 2; K - 3 9   Troponin negative    Currently rate is not controlled on cardizem gtt  Titrated metoprolol 50 mg bid to 50 mg tid      Monitor with Telemetry   Anticoagulation with heparin gtt   Cardizem drip on max  Emaline Folds Cardiology consult

## 2022-05-11 NOTE — ASSESSMENT & PLAN NOTE
Chronic  L>R  VAS negative for DVT  No concerns for infections  No fever, leukocytosis  No warm or tender to palpation  Discontinue vancomycin  Likely chronic venous stasis dermatitis  Will give IV lasix 20 mg once  Since patient appears to be volume overloaded

## 2022-05-11 NOTE — PROGRESS NOTES
ADT alert received  Patient admitted to [facility] with Afib with RVR  Will follow up after discharge

## 2022-05-11 NOTE — CONSULTS
Consultation - General Cardiology Team 2  Heather 56 y o  male MRN: [id number]  Unit/Bed#: [facility] 545-04 Encounter: [id number]      Inpatient consult to Cardiology  Consult performed by: Tomeka Dumont MD  Consult ordered by: Reji Ruffin MD        PCP: Alnie Perkins MD (Inactive)   Outpatient Cardiologist: None    History of Present Illness   Physician Requesting Consult: Nithya Christine MD  Reason for Consult / Principal Problem: Atrial fibrillation refractory to ongoing rate control    HPI: Heather is a 56y o  year old male with a history of cognitive deficit, recently diagnosed paroxysmal atrial fibrillation (02/2022), hypertension, hyperlipidemia, diabetes mellitus type 2  Patient presented to hospital with palpitations for the past 1 day  Patient has not been compliant with eliquis and metoprolol tartarate for the past month  He also reported of having increased shortness of breath  Patient woke up this morning with an episode of palpitations and was subsequently brought in by his father into the ED for further evaluation  Patient does allude to having weakness and having fast heart rate sensation during episodes of atrial fibrillation  He has had lower extremity swelling with CXR revealing mild vascular congestion at this admission  Cardiology was consulted for atrial fibrillation that remains refractory to initial rate control regimen  Review of Systems  ROS as noted above  Historical Information   Past Medical History:   Diagnosis Date    A-fib ([address] )     Hypertension      History reviewed  No pertinent surgical history    Social History     Substance and Sexual Activity   Alcohol Use Never     Social History     Substance and Sexual Activity   Drug Use Not Currently     Social History     Tobacco Use   Smoking Status Never Smoker   Smokeless Tobacco Never Used     Family History: non-contributory    Meds/Allergies   Hospital Medications:   Current Facility-Administered Medications Medication Dose Route Frequency    acetaminophen (TYLENOL) tablet 650 mg  650 mg Oral Q6H PRN    albuterol (PROVENTIL HFA,VENTOLIN HFA) inhaler 2 puff  2 puff Inhalation Q4H PRN    diltiazem (CARDIZEM) 125 mg in sodium chloride 0 9 % 125 mL infusion  1-15 mg/hr Intravenous Titrated    heparin (porcine) 25,000 units in 0 45% NaCl 250 mL infusion (premix)  3-20 Units/kg/hr (Order-Specific) Intravenous Titrated    heparin (porcine) injection 2,000 Units  2,000 Units Intravenous Q1H PRN    heparin (porcine) injection 4,000 Units  4,000 Units Intravenous Q1H PRN    metoprolol (LOPRESSOR) injection 2 5 mg  2 5 mg Intravenous Once    metoprolol tartrate (LOPRESSOR) tablet 50 mg  50 mg Oral Q8H [facility] halfway    pravastatin (PRAVACHOL) tablet 80 mg  80 mg Oral Daily With Dinner     Home Medications:   Medications Prior to Admission   Medication    albuterol (PROVENTIL HFA,VENTOLIN HFA) 90 mcg/act inhaler    apixaban (Eliquis) 5 mg    metFORMIN (GLUCOPHAGE-XR) 500 mg 24 hr tablet    metoprolol tartrate (LOPRESSOR) 50 mg tablet    rosuvastatin (CRESTOR) 10 MG tablet       Allergies   Allergen Reactions    Penicillins Other (See Comments)     unknown       Objective   Vitals: Blood pressure 128/92, pulse (!) 129, temperature 98 3 °F (36 8 °C), temperature source Oral, resp  rate 20, height 5' 10" (1 778 m), weight 120 kg (265 lb 6 4 oz), SpO2 93 %  Orthostatic Blood Pressures    Flowsheet Row Most Recent Value   Blood Pressure 128/92 filed at 05/11/2022 1100            Invasive Devices  Report    Peripheral Intravenous Line  Duration           Peripheral IV 05/10/22 Left Antecubital <1 day    Peripheral IV 05/10/22 Right Antecubital <1 day                Physical Exam  Vitals reviewed  Constitutional:       General: He is awake  Appearance: He is obese  HENT:      Head: Normocephalic  Cardiovascular:      Rate and Rhythm: Tachycardia present  Rhythm irregular  Pulses: Normal pulses  Heart sounds:  No murmur heard  Pulmonary:      Effort: Pulmonary effort is normal       Comments: Slightly diminished breath sounds at both lung bases  Abdominal:      General: Bowel sounds are normal       Palpations: Abdomen is soft  Musculoskeletal:      Right lower le+ Edema present  Left lower le+ Edema present  Skin:     General: Skin is warm and dry  Capillary Refill: Capillary refill takes less than 2 seconds  Neurological:      Mental Status: He is oriented to person, place, and time  Lab Results: I have personally reviewed pertinent lab results  Results from last 7 days   Lab Units 22  0000 05/10/22  1323   POTASSIUM mmol/L 3 9 3 7   CO2 mmol/L 20* 25   CHLORIDE mmol/L 104 103   BUN mg/dL 19 17   CREATININE mg/dL 1 23 1 20     Results from last 7 days   Lab Units 05/10/22  1323   HEMOGLOBIN g/dL 13 4   HEMATOCRIT % 45 9   PLATELETS Thousands/uL 220     Results from last 7 days   Lab Units 22  0236 05/10/22  2021 05/10/22  1325   PTT seconds 70* 68* 35             Imaging: CXR, VAS lower limb I have personally reviewed pertinent reports  ECHO: No results found for this or any previous visit  EKG:   Date: 2022  Interpretation: atrial fibrillation with rvr, normal axis          VTE Prophylaxis: Heparin SCDs      Assessment/Plan     # Paroxysmal atrial fibrillation: Patient with history of recently diagnosed non-valvular atrial fibrillation, noted to have palpitations and weakness, remains symptomatic with atrial fibrillation episodes, in context of medication noncompliance for past 1 month   CHADSVASc = 3 points, HAS-BLED = 2     - Currently on cardizem gtt, consider discontinuing/weaning if patient tolerating digoxin well and atrial fibrillation in control  - Start digoxin load 250 mcg followed by digoxin 125 mcg daily  - Maintain potassium > 4  - Continue with Lopressor 50 mg PO Q8H  - On heparin drip with eventual plans to switch to eliquis    # LE swelling: Maybe related to recent echo findings showing EF of 40% with slightly reduced systolic function (previous echo at 60% in 02/2022)  This maybe related to ongoing atrial fibrillation and therefore may improve with treatment of atrial fibrillation  May also be radiographically correlated to CXR findings of vascular congestion     - VAS negative for DVT   - Consider diuresis if needed or edema worsens  - Monitor potassium levels especially if patient will be on lasix and digoxin    Case discussed and reviewed with Dr Justa Gibson  Please wait for final recommendations from Dr Justa Gibson  Thank you for involving us in the care of your patient  Morelia Casanova MD  Internal Medicine Residency PGY-1  [place]  Available on [employer]  [email or web address]    ==========================================================================================    Counseling / Coordination of Care  Total floor / unit time spent today 30 minutes  Greater than 50% of total time was spent with the patient and / or family counseling and / or coordination of care  Epic/ Allscripts/Care Everywhere records reviewed: Yes    ** Please Note: Fluency DirectDictation voice to text software may have been used in the creation of this document   **

## 2022-05-11 NOTE — PROGRESS NOTES
INTERNAL MEDICINE RESIDENCY PROGRESS NOTE     Name: Sandrine   Age & Sex: 56 y o  male   MRN: [id number]  Unit/Bed#: [facility] 508-01   Encounter: [id number]  Team: [facility] Team B     PATIENT INFORMATION     Name: Sandrine   Age & Sex: 56 y o  male   MRN: [id number]  Hospital Stay Days: 1    ASSESSMENT/PLAN     Principal Problem:    Atrial fibrillation with rapid ventricular response ([address] )  Active Problems:    Asthma    Cognitive deficits    Type 2 diabetes mellitus, without long-term current use of insulin ([facility])    Hyperlipidemia    Leg swelling      * Atrial fibrillation with rapid ventricular response ([address] )  Assessment & Plan   Presented with c/o Palpitations and Dyspnea; currently rate is not controlled   Patient reports he is running out of Eliquis for a few weeks  He did not recall taking lopressor at home   EKG showed Afib with RVR   [id number]= 2 (HTN, DM)   ECHO on Feb showed normal EF and no significant valvular disease  Repeat Echo on this admission EF 40%   TSH 5 9   Lab work: Mg - 2; K - 3 9   Troponin negative    Currently rate is not controlled on cardizem gtt  Titrated metoprolol 50 mg bid to 50 mg tid   Monitor with Telemetry   Anticoagulation with heparin gtt   Cardizem drip on max   Cardiology consult      Leg swelling  Assessment & Plan  Chronic  L>R  VAS negative for DVT  No concerns for infections  No fever, leukocytosis  No warm or tender to palpation  Discontinue vancomycin  Likely chronic venous stasis dermatitis  Will give IV lasix 20 mg once  Since patient appears to be volume overloaded       Hyperlipidemia  Assessment & Plan  Continue statin     Type 2 diabetes mellitus, without long-term current use of insulin [facility])  Assessment & Plan  Lab Results   Component Value Date    HGBA1C 6 7 (H) 02/25/2022       Recent Labs     05/10/22  1753 05/10/22  2119 05/11/22  0555   POCGLU 119 135 89       Blood Sugar Average: Last 72 hrs:  (P) 197 [id number]     Monitor BG   Will add SSI if sugars are not at goal   Currently at goal without requiring insulin  Holding home med metformin    Cognitive deficits  Assessment & Plan  Baseline  Able to communicate clearly  Live at group home  Asthma  Assessment & Plan  Stable on albuterol as needed      Disposition: A fib with RVR on cardizem gtt  SUBJECTIVE     Patient seen and examined  No acute events overnight  He reports SOB on minimal exertion  No chest pain, palpitation, lightheadedness or dizziness  He reports of chronic lower extremity swelling L>R  OBJECTIVE     Vitals:    22 0550 22 0712 22 0838 22 1100   BP:  124/98  128/92   BP Location:  Right arm  Right arm   Pulse:  74 (!) 136 (!) 129   Resp:  20  20   Temp:  98 2 °F (36 8 °C)  98 3 °F (36 8 °C)   TempSrc:  Oral  Oral   SpO2:  (!) 87%  93%   Weight: 120 kg (265 lb 6 4 oz)      Height:          Temperature:   Temp (24hrs), Av °F (36 7 °C), Min:97 5 °F (36 4 °C), Max:98 3 °F (36 8 °C)    Temperature: 98 3 °F (36 8 °C)  Intake & Output:  I/O        0701  05/10 0700 05/10 0701   0700  0701   0700    P  O    250    I V  (mL/kg)  40 (0 3) 447 3 (3 7)    IV Piggyback  500     Total Intake(mL/kg)  540 (4 5) 697 3 (5 8)    Urine (mL/kg/hr)  200     Total Output  200     Net  +340 +697 3               Weights:   IBW (Ideal Body Weight): 73 kg    Body mass index is 38 08 kg/m²  Weight (last 2 days)     Date/Time Weight    22 0550 120 (265 4)    05/10/22 15:45:45 123 (272)    05/10/22 1311 124 (272 93)        Physical Exam  Vitals and nursing note reviewed  Constitutional:       General: He is in acute distress (mild)  Appearance: He is well-developed  He is obese  He is not ill-appearing, toxic-appearing or diaphoretic  HENT:      Head: Normocephalic and atraumatic  Eyes:      General: No scleral icterus       Conjunctiva/sclera: Conjunctivae normal    Cardiovascular:      Rate and Rhythm: Tachycardia present  Rhythm irregular  Pulses: Normal pulses  Heart sounds: Normal heart sounds  No murmur heard  No gallop  Pulmonary:      Effort: Pulmonary effort is normal  No respiratory distress  Breath sounds: No stridor  Rales (Bibasilar rales R>L) present  No wheezing or rhonchi  Abdominal:      General: Bowel sounds are normal  There is no distension  Palpations: Abdomen is soft  Tenderness: There is no abdominal tenderness  Musculoskeletal:      Cervical back: Neck supple  Right lower leg: Edema (trace) present  Left lower leg: Edema (trace) present  Skin:     General: Skin is warm and dry  Neurological:      Mental Status: He is alert  Mental status is at baseline  Psychiatric:         Mood and Affect: Mood normal          Behavior: Behavior normal        LABORATORY DATA     Labs: I have personally reviewed pertinent reports  Results from last 7 days   Lab Units 05/10/22  1323   WBC Thousand/uL 8 40   HEMOGLOBIN g/dL 13 4   HEMATOCRIT % 45 9   PLATELETS Thousands/uL 220   NEUTROS PCT % 68   MONOS PCT % 12      Results from last 7 days   Lab Units 05/11/22  0000 05/10/22  1323   POTASSIUM mmol/L 3 9 3 7   CHLORIDE mmol/L 104 103   CO2 mmol/L 20* 25   BUN mg/dL 19 17   CREATININE mg/dL 1 23 1 20   CALCIUM mg/dL 8 7 9 3   ALK PHOS U/L  --  94   ALT U/L  --  27   AST U/L  --  29     Results from last 7 days   Lab Units 05/11/22  0000   MAGNESIUM mg/dL 2 1          Results from last 7 days   Lab Units 05/11/22  0236 05/10/22  2021 05/10/22  1325   INR   --   --  1 39*   PTT seconds 70* 68* 35               IMAGING & DIAGNOSTIC TESTING     Radiology Results: I have personally reviewed pertinent reports  XR chest 1 view portable    Result Date: 5/10/2022  Impression: Borderline cardiomegaly  Mild vascular congestion  Bilateral pleural effusions  Workstation performed: [id number]     Other Diagnostic Testing: I have personally reviewed pertinent reports      ACTIVE MEDICATIONS     Current Facility-Administered Medications   Medication Dose Route Frequency    acetaminophen (TYLENOL) tablet 650 mg  650 mg Oral Q6H PRN    albuterol (PROVENTIL HFA,VENTOLIN HFA) inhaler 2 puff  2 puff Inhalation Q4H PRN    diltiazem (CARDIZEM) 125 mg in sodium chloride 0 9 % 125 mL infusion  1-15 mg/hr Intravenous Titrated    furosemide (LASIX) injection 20 mg  20 mg Intravenous Once    heparin (porcine) 25,000 units in 0 45% NaCl 250 mL infusion (premix)  3-20 Units/kg/hr (Order-Specific) Intravenous Titrated    heparin (porcine) injection 2,000 Units  2,000 Units Intravenous Q1H PRN    heparin (porcine) injection 4,000 Units  4,000 Units Intravenous Q1H PRN    metoprolol (LOPRESSOR) injection 2 5 mg  2 5 mg Intravenous Once    metoprolol tartrate (LOPRESSOR) tablet 50 mg  50 mg Oral Q8H [facility]    pravastatin (PRAVACHOL) tablet 80 mg  80 mg Oral Daily With Dinner       VTE Pharmacologic Prophylaxis: Heparin  VTE Mechanical Prophylaxis: sequential compression device    Portions of the record may have been created with voice recognition software  Occasional wrong word or "sound a like" substitutions may have occurred due to the inherent limitations of voice recognition software    Read the chart carefully and recognize, using context, where substitutions have occurred   ==  Chucky Hogue MD  [address]  Internal Medicine Residency PGY-2

## 2022-05-11 NOTE — QUICK NOTE
Contacted by [facility] overnight, the patient actually does not have a PCP  He was also admitted to [facility] back in Feb 2022  The patient is interested in establishing with a PCP upon discharge  JANINE will take over at 0730 under Dr Sav WANG will follow overnight until then      Rohan Roque DO, [address]  PGY-2, Internal Medicine  [facility]

## 2022-05-11 NOTE — ASSESSMENT & PLAN NOTE
Lab Results   Component Value Date    HGBA1C 6 7 (H) 02/25/2022       Recent Labs     05/10/22  1753 05/10/22  2119 05/11/22  0555   POCGLU 119 135 89       Blood Sugar Average: Last 72 hrs:  (P) 774 [id number]     Monitor BG  Will add SSI if sugars are not at goal   Currently at goal without requiring insulin     Holding home med metformin

## 2022-05-12 ENCOUNTER — APPOINTMENT (INPATIENT)
Dept: RADIOLOGY | Facility: HOSPITAL | Age: 47
DRG: 308 | End: 2022-05-12
Payer: MEDICARE

## 2022-05-12 PROBLEM — N17.9 AKI (ACUTE KIDNEY INJURY) (HCC): Status: ACTIVE | Noted: 2022-05-12

## 2022-05-12 PROBLEM — E87.1 HYPONATREMIA: Status: ACTIVE | Noted: 2022-05-12

## 2022-05-12 LAB
ANION GAP SERPL CALCULATED.3IONS-SCNC: 7 MMOL/L (ref 4–13)
APTT PPP: 40 SECONDS (ref 23–37)
BACTERIA UR QL AUTO: ABNORMAL /HPF
BILIRUB UR QL STRIP: ABNORMAL
BUN SERPL-MCNC: 28 MG/DL (ref 5–25)
CALCIUM SERPL-MCNC: 8.7 MG/DL (ref 8.3–10.1)
CHLORIDE SERPL-SCNC: 102 MMOL/L (ref 100–108)
CLARITY UR: ABNORMAL
CO2 SERPL-SCNC: 22 MMOL/L (ref 21–32)
COLOR UR: YELLOW
CREAT SERPL-MCNC: 2.35 MG/DL (ref 0.6–1.3)
CREAT UR-MCNC: 141 MG/DL
CREAT UR-MCNC: 141 MG/DL
GFR SERPL CREATININE-BSD FRML MDRD: 31 ML/MIN/1.73SQ M
GLUCOSE SERPL-MCNC: 139 MG/DL (ref 65–140)
GLUCOSE UR STRIP-MCNC: ABNORMAL MG/DL
HGB UR QL STRIP.AUTO: NEGATIVE
HYALINE CASTS #/AREA URNS LPF: ABNORMAL /LPF
KETONES UR STRIP-MCNC: NEGATIVE MG/DL
LEUKOCYTE ESTERASE UR QL STRIP: ABNORMAL
MRSA NOSE QL CULT: NORMAL
MUCOUS THREADS UR QL AUTO: ABNORMAL
NITRITE UR QL STRIP: NEGATIVE
NON-SQ EPI CELLS URNS QL MICRO: ABNORMAL /HPF
OSMOLALITY UR/SERPL-RTO: 295 MMOL/KG (ref 282–298)
OSMOLALITY UR: 297 MMOL/KG
PH UR STRIP.AUTO: 5.5 [PH]
POTASSIUM SERPL-SCNC: 4.6 MMOL/L (ref 3.5–5.3)
PROT UR STRIP-MCNC: ABNORMAL MG/DL
PROT UR-MCNC: 106 MG/DL
PROT/CREAT UR: 0.75 MG/G{CREAT} (ref 0–0.1)
RBC #/AREA URNS AUTO: ABNORMAL /HPF
SODIUM 24H UR-SCNC: 19 MOL/L
SODIUM SERPL-SCNC: 131 MMOL/L (ref 136–145)
SP GR UR STRIP.AUTO: 1.01 (ref 1–1.03)
T4 FREE SERPL-MCNC: 1.08 NG/DL (ref 0.76–1.46)
TSH SERPL DL<=0.05 MIU/L-ACNC: 5.39 UIU/ML (ref 0.45–4.5)
UROBILINOGEN UR STRIP-ACNC: 6 MG/DL
UUN 24H UR-MCNC: 168 MG/DL
WBC #/AREA URNS AUTO: ABNORMAL /HPF

## 2022-05-12 PROCEDURE — 83935 ASSAY OF URINE OSMOLALITY: CPT | Performed by: INTERNAL MEDICINE

## 2022-05-12 PROCEDURE — 81001 URINALYSIS AUTO W/SCOPE: CPT | Performed by: INTERNAL MEDICINE

## 2022-05-12 PROCEDURE — 76770 US EXAM ABDO BACK WALL COMP: CPT

## 2022-05-12 PROCEDURE — 84156 ASSAY OF PROTEIN URINE: CPT | Performed by: INTERNAL MEDICINE

## 2022-05-12 PROCEDURE — 84439 ASSAY OF FREE THYROXINE: CPT | Performed by: INTERNAL MEDICINE

## 2022-05-12 PROCEDURE — 80048 BASIC METABOLIC PNL TOTAL CA: CPT | Performed by: INTERNAL MEDICINE

## 2022-05-12 PROCEDURE — 85730 THROMBOPLASTIN TIME PARTIAL: CPT | Performed by: HOSPITALIST

## 2022-05-12 PROCEDURE — 84443 ASSAY THYROID STIM HORMONE: CPT | Performed by: INTERNAL MEDICINE

## 2022-05-12 PROCEDURE — 99223 1ST HOSP IP/OBS HIGH 75: CPT | Performed by: INTERNAL MEDICINE

## 2022-05-12 PROCEDURE — 82570 ASSAY OF URINE CREATININE: CPT | Performed by: INTERNAL MEDICINE

## 2022-05-12 PROCEDURE — 99233 SBSQ HOSP IP/OBS HIGH 50: CPT | Performed by: INTERNAL MEDICINE

## 2022-05-12 PROCEDURE — 84300 ASSAY OF URINE SODIUM: CPT | Performed by: INTERNAL MEDICINE

## 2022-05-12 PROCEDURE — 83930 ASSAY OF BLOOD OSMOLALITY: CPT | Performed by: INTERNAL MEDICINE

## 2022-05-12 PROCEDURE — 84540 ASSAY OF URINE/UREA-N: CPT | Performed by: INTERNAL MEDICINE

## 2022-05-12 PROCEDURE — 99232 SBSQ HOSP IP/OBS MODERATE 35: CPT | Performed by: INTERNAL MEDICINE

## 2022-05-12 RX ORDER — FUROSEMIDE 20 MG/1
20 TABLET ORAL DAILY
Status: DISCONTINUED | OUTPATIENT
Start: 2022-05-12 | End: 2022-05-12

## 2022-05-12 RX ORDER — FUROSEMIDE 10 MG/ML
40 INJECTION INTRAMUSCULAR; INTRAVENOUS ONCE
Status: COMPLETED | OUTPATIENT
Start: 2022-05-12 | End: 2022-05-12

## 2022-05-12 RX ADMIN — HEPARIN SODIUM 4000 UNITS: 1000 INJECTION INTRAVENOUS; SUBCUTANEOUS at 06:33

## 2022-05-12 RX ADMIN — METOPROLOL TARTRATE 50 MG: 50 TABLET, FILM COATED ORAL at 05:25

## 2022-05-12 RX ADMIN — FUROSEMIDE 20 MG: 20 TABLET ORAL at 08:45

## 2022-05-12 RX ADMIN — DIGOXIN 250 MCG: 0.25 INJECTION INTRAMUSCULAR; INTRAVENOUS at 05:25

## 2022-05-12 RX ADMIN — PRAVASTATIN SODIUM 80 MG: 80 TABLET ORAL at 16:55

## 2022-05-12 RX ADMIN — DILTIAZEM HYDROCHLORIDE 12.5 MG/HR: 5 INJECTION, SOLUTION INTRAVENOUS at 02:53

## 2022-05-12 RX ADMIN — FUROSEMIDE 40 MG: 10 INJECTION, SOLUTION INTRAMUSCULAR; INTRAVENOUS at 17:03

## 2022-05-12 RX ADMIN — METOPROLOL TARTRATE 50 MG: 50 TABLET, FILM COATED ORAL at 21:00

## 2022-05-12 RX ADMIN — DIGOXIN 250 MCG: 0.25 INJECTION INTRAMUSCULAR; INTRAVENOUS at 00:51

## 2022-05-12 RX ADMIN — APIXABAN 5 MG: 5 TABLET, FILM COATED ORAL at 08:45

## 2022-05-12 RX ADMIN — DIGOXIN 125 MCG: 125 TABLET ORAL at 13:10

## 2022-05-12 RX ADMIN — APIXABAN 5 MG: 5 TABLET, FILM COATED ORAL at 17:03

## 2022-05-12 RX ADMIN — METOPROLOL TARTRATE 50 MG: 50 TABLET, FILM COATED ORAL at 14:43

## 2022-05-12 NOTE — CONSULTS
Consultation - Nephrology   Jose Griffiths 56 y o  male MRN: [id number]  Unit/Bed#: [facility] 508-01 Encounter: [id number]    ASSESSMENT and PLAN:  Acute kidney injury:  Etiology: Suspect Hemodynamic changes with atrial fibrillation with RVR,  Prerenal component with IV diuretics, +/-  Obstructive uropathy,  Also need to rule out renal infarct in the setting of atrial fibrillation with RVR  Assessment:   After review medical records through [facility] everywhere baseline creatinine 1 1-1 2 dating back 10/2021   Admission creatinine 1 20 5/10/2022   Current creatinine 2 35 > 1 23    status post IV Lasix 20 mg on 05/10 and 5/11    not on Ace/ Arb/ diuretics/ NSAIDs- outpatient   Clinically, patient is not uremic and there is no acute indication for renal replacement therapy  Workup:   Urinalysis with micro reports: ordered    Renal imaging revealed: ordered    bladder scan <25 ml  Plan:   Strict I/Os, daily weights   Avoid nephrotoxins, NSAIDs, IV contrast if possible   Avoid hypotension or Perturbations of blood pressure to  Prevent decreased renal perfusion   Trend BMP   Adjust meds to appropriate GFR   Optimize hemodynamic status to avoid delay in renal recovery    will check urinalysis with micro    Will check renal ultrasound   Consider albumin with IV Lasix- likely intravascularly volume depleted/ 3rd spacing-will discuss with Dr Miesha Amor Consider holguin catheter for accurate I/O    Blood pressure/Hypertension:  Assessment and Plan:   Current blood pressure acceptable   Home medications: metoprolol 50 mg every 12 hours   Current medications: digoxin 125 mcg daily,  Lopressor 50 mg every 8 hours   Maximize hemodynamics to maintain MAP >65   Avoid hypotension or fluctuations in blood pressure   Will continue to trend     Electrolytes:  Assessment and Plan:  Hyponatremia:  Suspect volume mediated in the setting of diuretic use  ·  current sodium 131 <136 ( corrected with elevated glucose)  · status post IV Lasix 20 mg on 05/10 and 5/11  ·  poor urinary output    Atrial fibrillation with RVR:  Recently diagnosed in February of 2022  Assessment and plan:  ·  reported as not compliant with Eliquis and metoprolol over the last month  ·  treated with IV Cardizem,  Digoxin  ·  now on metoprolol and digoxin orally  ·  heart rate more controlled  ·  cardiology following    Cardiomyopathy:  Assessment and plan:  ·  echocardiogram on 05/10/2022 reveals EF of 40%,  Right atrium dilated,  Inferior vena cava normal in size (  This is a decrease from previous echocardiogram in February where EF was 60%)  ·  cardiology following    Volume status:  Assessment  And Plan:  · Chest xray on admission reviewed and revealed mild vascular congestion  · Has generalized edema  · Bladder scan <25 ml  · Concerned for  Intravascular volume depletion  ·  consider albumin and IV Lasix  · As above will discuss with Dr Melisa Marcial:  Requesting Physician: Meredith Maynard MD  Reason for Consult:  Acute kidney injury/ hyponatremia    Shane Alejandra is a 56 y o  male with past medical history of  Hypertension,  hyperlipidemia Diabetes II,  Atrial fibrillation,   Cognitive defects who presented to the emergency room with shortness of breath and palpitations  Workup revealed atrial fibrillation with RVR and was treated with IV drip diltiazem and IV digoxin  Currently on metoprolol and digoxin p o    A m  labs  Revealed acute kidney injury and hyponatremia and a renal consultation is requested today for evaluation and treatment options  On discussion the patient reports not making much urine  Does not have any kidney disease  Feels better but is still swollen  PAST MEDICAL HISTORY:  Past Medical History:   Diagnosis Date    [facility]     Hypertension        PAST SURGICAL HISTORY:  History reviewed  No pertinent surgical history      ALLERGIES:  Allergies   Allergen Reactions    Penicillins Other (See Comments)     unknown       SOCIAL HISTORY:  Social History     Substance and Sexual Activity   Alcohol Use Never     Social History     Substance and Sexual Activity   Drug Use Not Currently     Social History     Tobacco Use   Smoking Status Never Smoker   Smokeless Tobacco Never Used       FAMILY HISTORY:  History reviewed  No pertinent family history  MEDICATIONS:    Current Facility-Administered Medications:     acetaminophen (TYLENOL) tablet 650 mg, 650 mg, Oral, Q6H PRN, Shanae Pendleton MD    albuterol (PROVENTIL HFA,VENTOLIN HFA) inhaler 2 puff, 2 puff, Inhalation, Q4H PRN, Shanae Pendleton MD, 2 puff at 05/11/22 0240    apixaban (ELIQUIS) tablet 5 mg, 5 mg, Oral, BID, Ei Chucky Rodriges MD, 5 mg at 05/12/22 0845    [COMPLETED] digoxin (LANOXIN) injection 250 mcg, 250 mcg, Intravenous, Q6H, 250 mcg at 05/12/22 0525 **FOLLOWED BY** digoxin (LANOXIN) tablet 125 mcg, 125 mcg, Oral, Daily, Chucky Russ MD    metoprolol tartrate (LOPRESSOR) tablet 50 mg, 50 mg, Oral, Q8H [address], Ei Chucky Rodriges MD, 50 mg at 05/12/22 0525    pravastatin (PRAVACHOL) tablet 80 mg, 80 mg, Oral, Daily With Jasper Hess MD, 80 mg at 05/11/22 1645    REVIEW OF SYSTEMS:  Patient seen and examined at bedside  Review of Systems   Constitutional: Negative  HENT: Negative  Eyes: Negative  Respiratory: Negative  Cardiovascular: Positive for leg swelling  Gastrointestinal: Negative  Endocrine: Negative  Genitourinary: Positive for decreased urine volume and difficulty urinating  Musculoskeletal: Negative  Skin: Negative  Neurological: Negative  Psychiatric/Behavioral: Negative              PHYSICAL EXAM:  Current Weight: Weight - Scale: 122 kg (269 lb)  First Weight: Weight - Scale: 124 kg (272 lb 14 9 oz)  Vitals:    05/12/22 0530 05/12/22 0804 05/12/22 0847 05/12/22 1116   BP:  135/82 134/84 133/84   BP Location:  Right arm  Right arm   Pulse:  79 82 76   Resp:  19  19   Temp:  98 3 °F (36 8 °C) 97 8 °F (36 6 °C)   TempSrc:  Oral  Oral   SpO2:  90% 91% 94%   Weight: 122 kg (269 lb)      Height:           Intake/Output Summary (Last 24 hours) at 5/12/2022 1220  Last data filed at 5/12/2022 0843  Gross per 24 hour   Intake 1533 33 ml   Output 250 ml   Net 1283 33 ml       Physical Exam  Constitutional:       Appearance: Normal appearance  He is obese  HENT:      Head: Normocephalic and atraumatic  Nose: Nose normal       Mouth/Throat:      Mouth: Mucous membranes are dry  Eyes:      Extraocular Movements: Extraocular movements intact  Conjunctiva/sclera: Conjunctivae normal       Pupils: Pupils are equal, round, and reactive to light  Cardiovascular:      Rate and Rhythm: Normal rate and regular rhythm  Heart sounds: Normal heart sounds  Pulmonary:      Effort: Pulmonary effort is normal       Breath sounds: Normal breath sounds  Comments: Decreased bases  Abdominal:      Palpations: Abdomen is soft  Musculoskeletal:         General: Swelling present  Normal range of motion  Cervical back: Normal range of motion  Right lower leg: Edema present  Left lower leg: Edema present  Skin:     General: Skin is warm and dry  Neurological:      Mental Status: He is alert and oriented to person, place, and time  Mental status is at baseline     Psychiatric:         Behavior: Behavior normal              Invasive Devices:        Lab Results:   Results from last 7 days   Lab Units 05/12/22  0449 05/11/22  0000 05/10/22  1323   WBC Thousand/uL  --   --  8 40   HEMOGLOBIN g/dL  --   --  13 4   HEMATOCRIT %  --   --  45 9   PLATELETS Thousands/uL  --   --  220   SODIUM mmol/L 131* 134* 136   POTASSIUM mmol/L 4 6 3 9 3 7   CHLORIDE mmol/L 102 104 103   CO2 mmol/L 22 20* 25   BUN mg/dL 28* 19 17   CREATININE mg/dL 2 35* 1 23 1 20   CALCIUM mg/dL 8 7 8 7 9 3   MAGNESIUM mg/dL  --  2 1  --    ALK PHOS U/L  --   --  94   ALT U/L  --   --  27   AST U/L  --   --  29

## 2022-05-12 NOTE — ASSESSMENT & PLAN NOTE
· Patient was noted to have erythematous rash on left thigh  · He reports of worsening lower extremity swelling, left more than right  VAS negative for DVT  · He was given IV vancomycin 2 doses due to penicillin allergy on admission  Vancomycin was discontinued due to low suspicion for cellulitis however patient developed low-grade fever 99 5, spreading rash on the left thigh, warm to touch  Patient also had increase in leukocytosis  CRP, ESR elevated  Procalcitonin 1 75   · Restarted with IV ceftriaxone with clinical improvement  Patient has been afebrile, leukocytosis resolved  Procalcitonin down to 0 41  · Completed total of 5 days course of antibiotics

## 2022-05-12 NOTE — ASSESSMENT & PLAN NOTE
 Presented with A fib with RVR   Patient reports he is running out of Eliquis for a few weeks  He did not recall taking lopressor at home   [id number]= 2 (HTN, DM)   ECHO on Feb showed normal EF and no significant valvular disease  Repeat Echo on this admission EF 40%  [facility] Cardiology consulted   Not started cardizem due to low EF   Discontinue digoxin due to hyperkalemia   IV metoprolol prn for HR >120   Cannot undergo chemical or JET/cardioversion as there is a concern about his medication compliance due to underlying cognitive impairment   Currently rate controlled with metoprolol tartrate 150 mg bid   Eliquis price checked   Continue eliquis 5 mg bid   Follow up with cardiology on 5/25

## 2022-05-12 NOTE — ASSESSMENT & PLAN NOTE
· Baseline  Able to communicate clearly  · Live at group home  · He is relatively independent with ADLs  He uses the bus to go to the appointment  However he could not make an office appointment on his own  Will help to set up an appointment upon discharge

## 2022-05-12 NOTE — ASSESSMENT & PLAN NOTE
Lab Results   Component Value Date    HGBA1C 6 7 (H) 02/25/2022       Recent Labs     05/16/22  1053 05/16/22  1625 05/16/22 2037 05/17/22  0638   POCGLU 128 123 135 113       Blood Sugar Average: Last 72 hrs:  (P) 104 [id number]     · Monitor BG   · Holding home med metformin  · SSI and accucheck  · Hypoglycemic protocol

## 2022-05-12 NOTE — PROGRESS NOTES
INTERNAL MEDICINE RESIDENCY PROGRESS NOTE     Name: Sandrine   Age & Sex: 56 y o  male   MRN: [id number]  Unit/Bed#: [facility] 508-01   Encounter: [id number]  Team: SOD Team B     PATIENT INFORMATION     Name: Sandrine   Age & Sex: 56 y o  male   MRN: [id number]  Hospital Stay Days: 2    ASSESSMENT/PLAN     Principal Problem:    Atrial fibrillation with rapid ventricular response ([facility] )  Active Problems:    GLO (acute kidney injury) ([facility] )    Asthma    Cognitive deficits    Type 2 diabetes mellitus, without long-term current use of insulin (HCC)    Hyperlipidemia    Leg swelling    Hyponatremia      * Atrial fibrillation with rapid ventricular response ([facility] )  Assessment & Plan   Presented with c/o Palpitations and Dyspnea; currently rate is not controlled   Patient reports he is running out of Eliquis for a few weeks  He did not recall taking lopressor at home   EKG showed Afib with RVR   [id number]= 2 (HTN, DM)   ECHO on Feb showed normal EF and no significant valvular disease  Repeat Echo on this admission EF 40%   Cardiology consult   Resume eliquis   Rate is controlled with digoxin loading dose   Continue metoprolol 50 mg tid   Cardizem gtt was discontinued  GLO (acute kidney injury) ([facility] )  Assessment & Plan  · Creatinine on admission 1 2  · Today creatinine 2 35  · He was given IV lasix 20 mg once yesterday and po lasix 20 mg once this am    · Avoid nephrotoxin and hypotension  · Nephrology consult  · Bladder scan 28 cc  · Follow up with UA and renal US  · Follow up with BMP in am    Hyponatremia  Assessment & Plan  · Na 131 dropped from 134  · Patient looks volume overloaded  Given lasix, resulting GLO  · Follow serum osm, urine osm  · Nephrology consult  · TSH 5 9 on admission  Repeat TSH with reflex T4    Leg swelling  Assessment & Plan  · Chronic  L>R  VAS negative for DVT  · No concerns for infections  No fever, leukocytosis  No warm or tender to palpation   Discontinue vancomycin  · Likely chronic venous stasis dermatitis  · Given IV lasix 20 mg once yesterday and po lasix 20 mg once today  Creatinine up to 2 3  · Holding off lasix  · His albumin on admission was 3 7  · TSH was high 5 9  No free T4 reported  · Check TSH with reflex T4    Hyperlipidemia  Assessment & Plan  · Continue statin     Type 2 diabetes mellitus, without long-term current use of insulin [facility])  Assessment & Plan  Lab Results   Component Value Date    HGBA1C 6 7 (H) 2022       Recent Labs     05/10/22  1753 05/10/22  2119 22  0555   POCGLU 119 135 89       Blood Sugar Average: Last 72 hrs:  (P) 719 [id number]     · Monitor BG  · Will add SSI if sugars are not at goal   Currently at goal without requiring insulin  · Holding home med metformin    Cognitive deficits  Assessment & Plan  · Baseline  Able to communicate clearly  · Live at group home  · He is relatively independent with ADLs  He uses the bus to go to the appointment  However he could not make an office appointment on his own  Will help to set up an appointment upon discharge  Asthma  Assessment & Plan  Stable on albuterol as needed      Disposition: Continue med surg  Ongoing management for GLO    SUBJECTIVE     Patient seen and examined  No acute events overnight  He reports he is feeling better  No chest pain, SOB  Denies any abdominal pain or any urinary problems  OBJECTIVE     Vitals:    22 0804 22 0847 22 1116 22 1310   BP: 135/82 134/84 133/84    BP Location: Right arm  Right arm    Pulse: 79 82 76 101   Resp: 19  19    Temp: 98 3 °F (36 8 °C)  97 8 °F (36 6 °C)    TempSrc: Oral  Oral    SpO2: 90% 91% 94%    Weight:       Height:          Temperature:   Temp (24hrs), Av 1 °F (36 7 °C), Min:97 7 °F (36 5 °C), Max:98 6 °F (37 °C)    Temperature: 97 8 °F (36 6 °C)  Intake & Output:  I/O       05/10 07 07 07 07 07 0700    P  O   1230 540    I  V  (mL/kg) 40 (0 3) 700 7 (5 7)     IV Piggyback 500      Total Intake(mL/kg) 540 (4 5) 1930 7 (15 8) 540 (4 4)    Urine (mL/kg/hr) 200 250 (0 1)     Total Output 200 250     Net +340 +1680 7 +540               Weights:   IBW (Ideal Body Weight): 73 kg    Body mass index is 38 6 kg/m²  Weight (last 2 days)     Date/Time Weight    05/12/22 0530 122 (269)    05/11/22 0550 120 (265 4)    05/10/22 15:45:45 123 (272)    05/10/22 1311 124 (272 93)        Physical Exam  Vitals and nursing note reviewed  Constitutional:       General: He is not in acute distress  Appearance: He is well-developed  He is obese  He is not ill-appearing, toxic-appearing or diaphoretic  HENT:      Head: Normocephalic and atraumatic  Eyes:      General: No scleral icterus  Conjunctiva/sclera: Conjunctivae normal    Cardiovascular:      Rate and Rhythm: Normal rate  Rhythm irregular  Pulses: Normal pulses  Heart sounds: Normal heart sounds  No murmur heard  No gallop  Pulmonary:      Effort: Pulmonary effort is normal  No respiratory distress  Breath sounds: No stridor  Rales (Bibasilar rales R>L) present  No wheezing or rhonchi  Abdominal:      General: Bowel sounds are normal  There is no distension  Palpations: Abdomen is soft  Tenderness: There is no abdominal tenderness  Musculoskeletal:      Cervical back: Neck supple  Right lower leg: Edema (trace) present  Left lower leg: Edema (trace) present  Skin:     General: Skin is warm and dry  Neurological:      Mental Status: He is alert  Mental status is at baseline  Psychiatric:         Mood and Affect: Mood normal          Behavior: Behavior normal        LABORATORY DATA     Labs: I have personally reviewed pertinent reports    Results from last 7 days   Lab Units 05/10/22  1323   WBC Thousand/uL 8 40   HEMOGLOBIN g/dL 13 4   HEMATOCRIT % 45 9   PLATELETS Thousands/uL 220   NEUTROS PCT % 68   MONOS PCT % 12      Results from last 7 days   Lab Units 05/12/22  0449 05/11/22  0000 05/10/22  1323   POTASSIUM mmol/L 4 6 3 9 3 7   CHLORIDE mmol/L 102 104 103   CO2 mmol/L 22 20* 25   BUN mg/dL 28* 19 17   CREATININE mg/dL 2 35* 1 23 1 20   CALCIUM mg/dL 8 7 8 7 9 3   ALK PHOS U/L  --   --  94   ALT U/L  --   --  27   AST U/L  --   --  29     Results from last 7 days   Lab Units 05/11/22  0000   MAGNESIUM mg/dL 2 1          Results from last 7 days   Lab Units 05/12/22  0449 05/11/22  0236 05/10/22  2021 05/10/22  1325   INR   --   --   --  1 39*   PTT seconds 40* 70* 68* 35               IMAGING & DIAGNOSTIC TESTING     Radiology Results: I have personally reviewed pertinent reports  XR chest 1 view portable    Result Date: 5/10/2022  Impression: Borderline cardiomegaly  Mild vascular congestion  Bilateral pleural effusions  Workstation performed: [id number]     Other Diagnostic Testing: I have personally reviewed pertinent reports  ACTIVE MEDICATIONS     Current Facility-Administered Medications   Medication Dose Route Frequency    acetaminophen (TYLENOL) tablet 650 mg  650 mg Oral Q6H PRN    albuterol (PROVENTIL HFA,VENTOLIN HFA) inhaler 2 puff  2 puff Inhalation Q4H PRN    apixaban (ELIQUIS) tablet 5 mg  5 mg Oral BID    digoxin (LANOXIN) tablet 125 mcg  125 mcg Oral Daily    metoprolol tartrate (LOPRESSOR) tablet 50 mg  50 mg Oral Q8H [address]    pravastatin (PRAVACHOL) tablet 80 mg  80 mg Oral Daily With Dinner       VTE Pharmacologic Prophylaxis: Reason for no pharmacologic prophylaxis on Eliquis  VTE Mechanical Prophylaxis: sequential compression device    Portions of the record may have been created with voice recognition software  Occasional wrong word or "sound a like" substitutions may have occurred due to the inherent limitations of voice recognition software    Read the chart carefully and recognize, using context, where substitutions have occurred   ==  Chucky Estevez MD  [address] [facility]  Internal Medicine Residency PGY-2

## 2022-05-12 NOTE — ASSESSMENT & PLAN NOTE
· Na 131 >> 127 >>137  · Patient looks volume overloaded  · Serum osm 295  Urine osm 297  Urine Na 19  · Nephrology consult  · TSH 5 4 and free T4 1 08  Lipid panel: LDL 52, HDL 17, TG 99  · Continue to monitor Na  · Hypervolemic isotonic hyponatremia in the setting of acute CHF exacerbation     · Na improved with diuresis  · Fluid restriction 1 2 L per day

## 2022-05-12 NOTE — PROGRESS NOTES
Cardiology Team 2 - Progress Note     Cintia Duffy 56 y o  male MRN: [id number]    [facility] Encounter: [id number]            Subjective     Patient seen and examined  No significant events overnight  He reports that he stays in [address] [place] [address] and is willing to follow up with cardiology in the outpatient setting  Hospital Course:   Cintia Duffy is a 56y o  year old male with a history of cognitive deficit, recently diagnosed paroxysmal atrial fibrillation (02/2022), hypertension, hyperlipidemia, diabetes mellitus type 2  Patient presented to hospital with palpitations for the past 1 day  Patient has not been compliant with eliquis and metoprolol tartarate for the past month  He also reported of having increased shortness of breath  Patient woke up this morning with an episode of palpitations and was subsequently brought in by his father into the ED for further evaluation  Patient does allude to having weakness and having fast heart rate sensation during episodes of atrial fibrillation  He has had lower extremity swelling with CXR revealing mild vascular congestion at this admission  Cardiology was consulted for atrial fibrillation that remains refractory to initial rate control regimen  Overnight, patient was switched to eliquis from heparin, cardizem was discontinued  Stable from cardiology standpoint for discharge  Vitals: Blood pressure 133/84, pulse 76, temperature 97 8 °F (36 6 °C), resp  rate 19, height 5' 10" (1 778 m), weight 122 kg (269 lb), SpO2 94 %  , Body mass index is 38 6 kg/m² ,   Orthostatic Blood Pressures    Flowsheet Row Most Recent Value   Blood Pressure 133/84 filed at 05/12/2022 1116   Patient Position - Orthostatic VS Sitting filed at 05/12/2022 0804            Intake/Output Summary (Last 24 hours) at 5/12/2022 1122  Last data filed at 5/12/2022 0843  Gross per 24 hour   Intake 1533 33 ml   Output 250 ml   Net 1283 33 ml             Objective     Physical Exam  Vitals reviewed  HENT:      Head: Normocephalic  Cardiovascular:      Rate and Rhythm: Normal rate and regular rhythm  Pulses: Normal pulses  Heart sounds: Normal heart sounds  Pulmonary:      Effort: Pulmonary effort is normal       Breath sounds: Normal breath sounds  Abdominal:      General: Bowel sounds are normal       Palpations: Abdomen is soft  Skin:     General: Skin is warm and dry  Capillary Refill: Capillary refill takes less than 2 seconds  Neurological:      Mental Status: He is alert and oriented to person, place, and time              Current Facility-Administered Medications:     acetaminophen (TYLENOL) tablet 650 mg, 650 mg, Oral, Q6H PRN, Marcelo Espinosa MD    albuterol (PROVENTIL HFA,VENTOLIN HFA) inhaler 2 puff, 2 puff, Inhalation, Q4H PRN, Marcelo Espinosa MD, 2 puff at 05/11/22 0240    apixaban (ELIQUIS) tablet 5 mg, 5 mg, Oral, BID, Aundrea Edge MD, 5 mg at 05/12/22 0845    [COMPLETED] digoxin (LANOXIN) injection 250 mcg, 250 mcg, Intravenous, Q6H, 250 mcg at 05/12/22 0525 **FOLLOWED BY** digoxin (LANOXIN) tablet 125 mcg, 125 mcg, Oral, Daily, Ei Landon Fabry, MD    diltiazem (CARDIZEM) 125 mg in sodium chloride 0 9 % 125 mL infusion, 1-15 mg/hr, Intravenous, Titrated, Samantha Knox DO, Held at 05/12/22 0710    metoprolol tartrate (LOPRESSOR) tablet 50 mg, 50 mg, Oral, Q8H [address], Ei Ei Yenni Jimenez MD, 50 mg at 05/12/22 0525    pravastatin (PRAVACHOL) tablet 80 mg, 80 mg, Oral, Daily With Kirby Ureña MD, 80 mg at 05/11/22 1645    Labs & Results:          Results from last 7 days   Lab Units 05/12/22  0449 05/11/22  0000 05/10/22  1323   POTASSIUM mmol/L 4 6 3 9 3 7   CO2 mmol/L 22 20* 25   CHLORIDE mmol/L 102 104 103   BUN mg/dL 28* 19 17   CREATININE mg/dL 2 35* 1 23 1 20     Results from last 7 days   Lab Units 05/10/22  1323   HEMOGLOBIN g/dL 13 4   HEMATOCRIT % 45 9   PLATELETS Thousands/uL 220           Telemetry:   Personally reviewed by Shivani Burgess Daina Gallegos MD: HRs between 75-95    Imaging: CXR, VAS      VTE Prophylaxis: Reason for no pharmacologic prophylaxis On eliquis SCDs         Assessment and Plan      Assessment:  Principal Problem:    Atrial fibrillation with rapid ventricular response (HCC)  Active Problems:    Asthma    Cognitive deficits    Type 2 diabetes mellitus, without long-term current use of insulin (HCC)    Hyperlipidemia    Leg swelling          # Paroxysmal atrial fibrillation: Patient with history of recently diagnosed non-valvular atrial fibrillation, noted to have palpitations and weakness, remains symptomatic with atrial fibrillation episodes, in context of medication noncompliance for past 1 month  CHADSVASc = 3 points, HAS-BLED = 2  HRs better controlled today      - Off of cardizem drip  - Continue eliquis  - Continue with Lopressor 50 mg PO Q8H  - On digoxin  - Maintain potassium > 4  - Outpatient follow up with cardiology highly recommended, follow up provider- cardiology added under discharge tab  - Plan discussed with primary team via TT     # LE swelling: Maybe related to recent echo findings showing EF of 40% with slightly reduced systolic function (previous echo at 60% in 02/2022)  This maybe related to ongoing atrial fibrillation and therefore may improve with treatment of atrial fibrillation  May also be radiographically correlated to CXR findings of vascular congestion      - VAS negative for DVT   - Consider diuresis if needed or edema worsens  - Monitor potassium levels especially if patient will be on lasix and digoxin       Case discussed and reviewed with Dr Debbie Bright  Please wait for final recommendations from Dr Debbie Bright        Thank you for involving us in the care of your patient  Tomeka Dumont MD  Internal Medicine Residency PGY-1  [facility]  Available on [place]  [email or web address]      ** Please Note: Fluency DirectDictation voice to text software may have been used in the creation of this document   **

## 2022-05-12 NOTE — ASSESSMENT & PLAN NOTE
· Creatinine on admission 1 2  Baseline appears to be around 1 2   · Cr 1 2 > 2 35 > 3 > 3 2 > 2 5 > 2 3 > 1 8  · UA showed 1-2 RBC, 3-4 WBC and multiple hyaline casts  Urine eosinophil 0%  · Bladder scan: <30 cc  · Renal US normal, no hydronephrosis, no obstruction  · Patient appears to be volume overloaded  · He received 2 doses of IV vancomycin on admission  Urine eosinophil 0%  · Likely prerenal due to cardiorenal in the setting of severe volume overload vs AIN due to medications (s/p vancomycin 2 doses) Unlikely post renal as no obstruction noted on US  · Nephrology is following  · Avoid nephrotoxin and hypotension  · Kidney function slowly improving with IV diuresis  · Continue to improve with diuresis  · Transition to po torsemide   Continue po torsemide 20 mg qd upon discharge

## 2022-05-13 ENCOUNTER — APPOINTMENT (INPATIENT)
Dept: RADIOLOGY | Facility: HOSPITAL | Age: 47
DRG: 308 | End: 2022-05-13
Payer: MEDICARE

## 2022-05-13 LAB
ANION GAP SERPL CALCULATED.3IONS-SCNC: 7 MMOL/L (ref 4–13)
BUN SERPL-MCNC: 35 MG/DL (ref 5–25)
CALCIUM SERPL-MCNC: 8.8 MG/DL (ref 8.3–10.1)
CHLORIDE SERPL-SCNC: 99 MMOL/L (ref 100–108)
CO2 SERPL-SCNC: 25 MMOL/L (ref 21–32)
CREAT SERPL-MCNC: 3.02 MG/DL (ref 0.6–1.3)
CRP SERPL QL: 42.9 MG/L
EOSINOPHIL NFR URNS MANUAL: 0 %
ERYTHROCYTE [DISTWIDTH] IN BLOOD BY AUTOMATED COUNT: 17.4 % (ref 11.6–15.1)
ERYTHROCYTE [SEDIMENTATION RATE] IN BLOOD: 58 MM/HOUR (ref 0–14)
GFR SERPL CREATININE-BSD FRML MDRD: 23 ML/MIN/1.73SQ M
GLUCOSE SERPL-MCNC: 124 MG/DL (ref 65–140)
HCT VFR BLD AUTO: 44.5 % (ref 36.5–49.3)
HGB BLD-MCNC: 12.7 G/DL (ref 12–17)
MCH RBC QN AUTO: 23.6 PG (ref 26.8–34.3)
MCHC RBC AUTO-ENTMCNC: 28.5 G/DL (ref 31.4–37.4)
MCV RBC AUTO: 83 FL (ref 82–98)
PLATELET # BLD AUTO: 205 THOUSANDS/UL (ref 149–390)
PMV BLD AUTO: 11.3 FL (ref 8.9–12.7)
POTASSIUM SERPL-SCNC: 4.6 MMOL/L (ref 3.5–5.3)
PROCALCITONIN SERPL-MCNC: 1.73 NG/ML
RBC # BLD AUTO: 5.39 MILLION/UL (ref 3.88–5.62)
SODIUM SERPL-SCNC: 131 MMOL/L (ref 136–145)
WBC # BLD AUTO: 13.86 THOUSAND/UL (ref 4.31–10.16)

## 2022-05-13 PROCEDURE — 97163 PT EVAL HIGH COMPLEX 45 MIN: CPT

## 2022-05-13 PROCEDURE — 99233 SBSQ HOSP IP/OBS HIGH 50: CPT | Performed by: INTERNAL MEDICINE

## 2022-05-13 PROCEDURE — 85652 RBC SED RATE AUTOMATED: CPT | Performed by: INTERNAL MEDICINE

## 2022-05-13 PROCEDURE — 97166 OT EVAL MOD COMPLEX 45 MIN: CPT

## 2022-05-13 PROCEDURE — 84145 PROCALCITONIN (PCT): CPT | Performed by: INTERNAL MEDICINE

## 2022-05-13 PROCEDURE — 71046 X-RAY EXAM CHEST 2 VIEWS: CPT

## 2022-05-13 PROCEDURE — 80048 BASIC METABOLIC PNL TOTAL CA: CPT | Performed by: INTERNAL MEDICINE

## 2022-05-13 PROCEDURE — 99231 SBSQ HOSP IP/OBS SF/LOW 25: CPT | Performed by: INTERNAL MEDICINE

## 2022-05-13 PROCEDURE — 87205 SMEAR GRAM STAIN: CPT | Performed by: NURSE PRACTITIONER

## 2022-05-13 PROCEDURE — 85027 COMPLETE CBC AUTOMATED: CPT | Performed by: INTERNAL MEDICINE

## 2022-05-13 PROCEDURE — 86140 C-REACTIVE PROTEIN: CPT | Performed by: INTERNAL MEDICINE

## 2022-05-13 RX ORDER — FUROSEMIDE 10 MG/ML
80 INJECTION INTRAMUSCULAR; INTRAVENOUS ONCE
Status: COMPLETED | OUTPATIENT
Start: 2022-05-13 | End: 2022-05-13

## 2022-05-13 RX ORDER — METOPROLOL TARTRATE 50 MG/1
100 TABLET, FILM COATED ORAL EVERY 12 HOURS SCHEDULED
Status: DISCONTINUED | OUTPATIENT
Start: 2022-05-13 | End: 2022-05-14

## 2022-05-13 RX ORDER — METOPROLOL TARTRATE 5 MG/5ML
5 INJECTION INTRAVENOUS EVERY 6 HOURS PRN
Status: DISCONTINUED | OUTPATIENT
Start: 2022-05-13 | End: 2022-05-18 | Stop reason: HOSPADM

## 2022-05-13 RX ORDER — METOPROLOL TARTRATE 50 MG/1
50 TABLET, FILM COATED ORAL ONCE
Status: COMPLETED | OUTPATIENT
Start: 2022-05-13 | End: 2022-05-13

## 2022-05-13 RX ADMIN — METOPROLOL TARTRATE 50 MG: 50 TABLET, FILM COATED ORAL at 06:10

## 2022-05-13 RX ADMIN — METOPROLOL TARTRATE 100 MG: 50 TABLET, FILM COATED ORAL at 20:26

## 2022-05-13 RX ADMIN — PRAVASTATIN SODIUM 80 MG: 80 TABLET ORAL at 15:51

## 2022-05-13 RX ADMIN — APIXABAN 5 MG: 5 TABLET, FILM COATED ORAL at 17:09

## 2022-05-13 RX ADMIN — METOPROLOL TARTRATE 50 MG: 50 TABLET, FILM COATED ORAL at 15:51

## 2022-05-13 RX ADMIN — APIXABAN 5 MG: 5 TABLET, FILM COATED ORAL at 08:05

## 2022-05-13 RX ADMIN — DIGOXIN 125 MCG: 125 TABLET ORAL at 08:05

## 2022-05-13 RX ADMIN — FUROSEMIDE 80 MG: 10 INJECTION, SOLUTION INTRAMUSCULAR; INTRAVENOUS at 18:17

## 2022-05-13 NOTE — OCCUPATIONAL THERAPY NOTE
Occupational Therapy Evaluation     Patient Name: Charles Graham  Today's Date: 5/13/2022  Problem List  Principal Problem:    Atrial fibrillation with rapid ventricular response ([facility] [address] )  Active Problems:    Asthma    Cognitive deficits    Type 2 diabetes mellitus, without long-term current use of insulin (HCC)    Hyperlipidemia    Leg swelling    GLO (acute kidney injury) ([facility] [address] )    Hyponatremia    Past Medical History  Past Medical History:   Diagnosis Date    A-fib [facility])     Hypertension         05/13/22 0955   OT Last Visit   OT Visit Date 05/13/22   Restrictions/Precautions   Weight Bearing Precautions Per Order No   Other Precautions Cognitive   Pain Assessment   Pain Assessment Tool 0-10   Pain Score No Pain   Home Living   Type of Home Group Home   Home Layout One level   Bathroom Toilet Standard   Home Equipment   (none)   Prior Function   Level of [place] Independent with ADLs and functional mobility   Lives With Alone   Receives Help From Friend(s)   ADL Assistance Independent   IADLs Independent   Falls in the last 6 months 0   Vocational On disability   Lifestyle   Autonomy Pt reports being I w/ ADL, ADLs, and mobility  He reports that his friends sometimes help with IADLs  Reciprocal Relationships Supportive friends   Service to Others Not working   Intrinsic Gratification Enjoys candy crush, Marguerite, and sci fi     Psychosocial   Psychosocial (WDL) WDL   ADL   Where Assessed Chair   Grooming Assistance 5  Supervision/Setup   UB Bathing Assistance 5  Supervision/Setup   LB Bathing Assistance 5  Supervision/Setup   UB Dressing Assistance 5  Supervision/Setup   LB Dressing Assistance 5  Supervision/Setup   Toileting Assistance  5  Supervision/Setup   Functional Assistance 5  Supervision/Setup   Bed Mobility   Additional Comments Pt seated EOB at start and in recliner at end of session   Transfers   Sit to Stand 5  Supervision   Stand to Sit 5  Supervision   Toilet transfer 5  Supervision   Functional Mobility   Functional Mobility 5  Supervision   Balance   Static Sitting Fair +   Dynamic Sitting Fair +   Static Standing Fair   Dynamic Standing Fair   Activity Tolerance   Activity Tolerance Patient tolerated treatment well   Medical Staff Made Aware Co-eval 2* medical complexity   Nurse Made Aware Yes   RUE Assessment   RUE Assessment WFL   LUE Assessment   LUE Assessment WFL   Hand Function   Gross Motor Coordination Functional   Fine Motor Coordination Functional   Cognition   Overall Cognitive Status Impaired   Arousal/Participation Alert; Responsive; Cooperative   Attention Within functional limits   Orientation Level Oriented X4   Memory Decreased recall of precautions   Following Commands Follows one step commands without difficulty   Comments Pt pleasant and cooperative  He has a baseline cog impairment  Assessment   Limitation Decreased self-care trans;Decreased high-level ADLs   Prognosis Good   Assessment Pt is a 56 y o  male who was admitted to [facility] on 5/10/2022 with Atrial fibrillation with rapid ventricular response ([facility] [address] )  Pt  has a past medical history of A-fib ([facility] [address] ) and Hypertension  He also has baseline cog impairment  At baseline pt was I w/ ADLs and mobility  Pt lives in a group home and receives assist as needed from friends  Currently pt requires S for overall ADLS and S for functional mobility/transfers  Pt currently presents with impairments in the following categories -limited home support and difficulty performing IADLS  endurance  These impairments, as well as pt's fatigue  limit pt's ability to 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, includinghouse maintenance, medication management and leisure activities  From OT standpoint, recommend pt return home w/ increased social support as needed  The patient's raw score on the -PAC Daily Activity inpatient short form is 22, standardized score is 47 1, greater than 39 4   Patients at this level are likely to benefit from discharge to home  Please refer to the recommendation of the Occupational Therapist for safe discharge planning  Pt appears to be functioning at or close to his baseline  No further acute OT needs warranted at this time  D/C OT     Goals   Patient Goals to go home   Plan   OT Frequency Eval only   Recommendation   OT Discharge Recommendation No rehabilitation needs   OT - OK to Discharge Yes   AM-PAC Daily Activity Inpatient   Lower Body Dressing 3   Bathing 3   Toileting 4   Upper Body Dressing 4   Grooming 4   Eating 4   Daily Activity Raw Score 22   Daily Activity Standardized Score (Calc for Raw Score >=11) 47 1       Solomon Smith, OTR/L

## 2022-05-13 NOTE — PLAN OF CARE
Problem: PHYSICAL THERAPY ADULT  Goal: Performs mobility at highest level of function for planned discharge setting  See evaluation for individualized goals  Description:            See flowsheet documentation for full assessment, interventions and recommendations  Note: Prognosis: Good  Problem List: Decreased endurance, Impaired balance, Decreased mobility, Impaired judgement, Decreased cognition, Decreased safety awareness, Obesity, Decreased skin integrity  Assessment: Pt is 56 y o  male seen for PT evaluation s/p admit to [facility] on 5/10/2022  Pt presenting w/ afib w/ RVR   - noncompliant w/ medications 2* not being able to obtain (from EMR)- at baseline pt w/ cog impairment- lives in [facility] and ambulates indep and w/o AD community distances unrestricted distances and uses public transport  Reports having no outpt CM/  at baseline  Comorbidities affecting pt's physical performance at time of assessment listed above and significant for:  has a past medical history of A-fib ([facility] [address] ) and Hypertension    Personal factors affecting pt at time of IE include: steps to enter environment, limited home support, cognition impairment; financial and environmental barriers for d/c;  past experience, limited insight into impairments and recent decline in function/ endurance;  Due to acute medical issues, ongoing medical workup for primary dx; pain, fall risk, decreased activity tolerance compared to baseline, o2- tele monitoring for afib;  increased assistance needed from caregiver at current time, continuous telemetry monitoring, multiple lines, decline in overall functional mobility status; health management issues; note unstable clinical picture (high complexity)  Currently pt  is requiring S A for bed skills; S for functional transfers and S for ambulation limited distnances w/o AD w/ inc HR max and drop in Spo2 requiring placement on O2     Pt presents functioning below baseline and currently w/ overall mobility deficits 2* to: obesity;  generalized weakness/ deconditioning; decreased endurance; decreased activity tolerancegait deviations; decreased safety awareness; SOB/SCHERER; fatigue; impaired safety and judgement; limited insight into current deficits; obesity; multiple lines;(Please find additional objective findings from PT assessment regarding body systems outlined above ) Pt will continue to benefit from skilled PT interventions to address stated impairments; to maximize functional potential; for ongoing pt/ family training; and DME needs  Anticipate that with continued progress pt to d/c back to [facility] and w/ home PT when medically cleared  Barriers to Discharge: Inaccessible home environment, Decreased caregiver support                   See flowsheet documentation for full assessment

## 2022-05-13 NOTE — PROGRESS NOTES
Progress Note - Nephrology   [place] 56 y o  male MRN: [id number]  Unit/Bed#: [facility] 508-01 Encounter: [id number]    ASSESSMENT and PLAN:  Acute kidney injury:  Etiology: Suspect Hemodynamic changes with atrial fibrillation with RVR,  Prerenal component with IV diuretics, +/-  Obstructive uropathy,  +/_ AIN given ABX  Leading to ATN  Assessment:  · After review medical records through [facility] everywhere baseline creatinine 1 1-1 2 dating back 10/2021  · Admission creatinine 1 20 5/10/2022  · Current creatinine 3 02> 2 35 > 1 23  · S/p IV lasix  · U/O marginal-640 ml/24 hours  ·  not on Ace/ Arb/ diuretics/ NSAIDs- outpatient  · Clinically, patient is not uremic and there is no acute indication for renal replacement therapy-however if continues to worsen, may need to consider  Workup:  · Urinalysis with micro reports: small leukocytes,+2 protein, 1-2 RBC, 2-4 WBC, hyaline cast 5-10  ·  Renal imaging on U/S revealed normal echogenicity and contour   No mass or hydronephrosis B/L  ·  bladder scan <25 ml  · UPCR 0 75  · Urine Eos-pending  Plan:  · Strict I/Os, daily weights  · Avoid nephrotoxins, NSAIDs, IV contrast if possible  · Avoid hypotension or Perturbations of blood pressure to  Prevent decreased renal perfusion  · Trend BMP  · Adjust meds to appropriate GFR  · Optimize hemodynamic status to avoid delay in renal recovery  · Consider serologies given petechia noted on right lower leg to rule out vasculities  · Consider albumin with IV Lasix- likely intravascularly volume depleted/ 3rd spacing-will discuss with Dr Vanessa Pena  · Consider holguin catheter for accurate I/O     Blood pressure/Hypertension:  Assessment and Plan:  · Current blood pressure acceptable  · Home medications: metoprolol 50 mg every 12 hours  · Current medications: digoxin 125 mcg daily,  Lopressor 50 mg every 8 hours  · Maximize hemodynamics to maintain MAP >65  · Avoid hypotension or fluctuations in blood pressure  · Will continue to trend     Electrolytes:  Assessment and Plan:  Hyponatremia:  Suspect volume mediated in the setting of diuretic use  ·  current sodium 131   ·  status post IV Lasix 2 times 5/12/2022  ·  urinary output marginal- improved by night time     Atrial fibrillation with RVR:  Recently diagnosed in February of 2022  Assessment and plan:  ·  reported as not compliant with Eliquis and metoprolol over the last month  ·  treated with IV Cardizem,  Digoxin  ·  now on metoprolol and digoxin orally  ·  heart rate more controlled  ·  cardiology following     Cardiomyopathy:  Assessment and plan:  ·  echocardiogram on 05/10/2022 reveals EF of 40%,  Right atrium dilated,  Inferior vena cava normal in size (  This is a decrease from previous echocardiogram in February where EF was 60%)  ·  cardiology following     Volume status:  Assessment  And Plan:  · Chest xray on admission reviewed and revealed mild vascular congestion  · Has generalized edema  · Bladder scan <25 ml  · Concerned for  Intravascular volume depletion  ·  consider albumin and IV Lasix  · As above will discuss with Dr Theresa Clay  · UPCR 0 75- not NRP    Now with low grade fever and tachycardia:   · procalcitonin pending per primary team  · Currently not on ABX          Review of systems  Patient seen and examined at bedside:  Review of Systems   Constitutional: Positive for fatigue  HENT: Negative  Eyes: Negative  Respiratory: Negative  Cardiovascular: Positive for leg swelling  Gastrointestinal: Negative  Endocrine: Negative  Genitourinary:        Reports making more urine   Musculoskeletal: Negative  Neurological: Positive for weakness  Psychiatric/Behavioral: Negative             Physical exam:  Current Weight: Weight - Scale: 122 kg (269 lb 12 8 oz)  Vitals:    05/13/22 0600 05/13/22 0610 05/13/22 0725 05/13/22 1208   BP:  156/86 151/90 148/90   BP Location:       Pulse:  (!) 110 (!) 109    Resp:   18 18   Temp:   99 5 °F (37 5 °C)    TempSrc: SpO2:   91%    Weight: 122 kg (269 lb 12 8 oz)      Height:           Intake/Output Summary (Last 24 hours) at 5/13/2022 1226  Last data filed at 5/13/2022 1158  Gross per 24 hour   Intake 1260 89 ml   Output 605 ml   Net 655 89 ml       Physical Exam  Constitutional:       Appearance: Normal appearance  He is obese  HENT:      Head: Normocephalic and atraumatic  Mouth/Throat:      Mouth: Mucous membranes are moist    Eyes:      Extraocular Movements: Extraocular movements intact  Conjunctiva/sclera: Conjunctivae normal       Pupils: Pupils are equal, round, and reactive to light  Cardiovascular:      Rate and Rhythm: Tachycardia present  Rhythm irregular  Heart sounds: Normal heart sounds  Pulmonary:      Breath sounds: Normal breath sounds  Comments: Slightly decreased bases  Abdominal:      General: Bowel sounds are normal       Palpations: Abdomen is soft  Musculoskeletal:         General: Swelling present  Cervical back: Normal range of motion and neck supple  Right lower leg: Edema present  Left lower leg: Edema present  Skin:     General: Skin is warm and dry  Neurological:      Mental Status: He is alert  Mental status is at baseline  Psychiatric:         Mood and Affect: Mood normal          Behavior: Behavior normal          Thought Content:  Thought content normal          Judgment: Judgment normal             Medications:    Current Facility-Administered Medications:     acetaminophen (TYLENOL) tablet 650 mg, 650 mg, Oral, Q6H PRN, Rachel Kapadia MD    albuterol (PROVENTIL HFA,VENTOLIN HFA) inhaler 2 puff, 2 puff, Inhalation, Q4H PRN, Rachel Kapadia MD, 2 puff at 05/11/22 0240    apixaban (ELIQUIS) tablet 5 mg, 5 mg, Oral, BID, Chucky Rodriges MD, 5 mg at 05/13/22 0805    [COMPLETED] digoxin (LANOXIN) injection 250 mcg, 250 mcg, Intravenous, Q6H, 250 mcg at 05/12/22 0525 **FOLLOWED BY** digoxin (LANOXIN) tablet 125 mcg, 125 mcg, Oral, Daily, Alexus Pennington MD, 125 mcg at 05/13/22 0805    metoprolol (LOPRESSOR) injection 5 mg, 5 mg, Intravenous, Q6H PRN, Alexus Pennington MD    metoprolol tartrate (LOPRESSOR) tablet 50 mg, 50 mg, Oral, Q8H [address], Ei Jessica Thompson MD, 50 mg at 05/13/22 0610    pravastatin (PRAVACHOL) tablet 80 mg, 80 mg, Oral, Daily With Stephy Silva MD, 80 mg at 05/12/22 1655    Laboratory Results:  Results from last 7 days   Lab Units 05/13/22  0509 05/12/22  0449 05/11/22  0000 05/10/22  1323   WBC Thousand/uL 13 86*  --   --  8 40   HEMOGLOBIN g/dL 12 7  --   --  13 4   HEMATOCRIT % 44 5  --   --  45 9   PLATELETS Thousands/uL 205  --   --  220   SODIUM mmol/L 131* 131* 134* 136   POTASSIUM mmol/L 4 6 4 6 3 9 3 7   CHLORIDE mmol/L 99* 102 104 103   CO2 mmol/L 25 22 20* 25   BUN mg/dL 35* 28* 19 17   CREATININE mg/dL 3 02* 2 35* 1 23 1 20   CALCIUM mg/dL 8 8 8 7 8 7 9 3   MAGNESIUM mg/dL  --   --  2 1  --

## 2022-05-13 NOTE — PHYSICAL THERAPY NOTE
PHYSICAL THERAPY EVALUATION  NAME:  Liu Shepherd  DATE: 05/13/22    AGE:   56 y o  Mrn:   [id number]  ADMIT DX:  Palpitations [R00 2]  SOB (shortness of breath) [R06 02]  Atrial fibrillation with rapid ventricular response (HCC) [I48 91]    Past Medical History:   Diagnosis Date    A-fib ([address] )     Hypertension      History reviewed  No pertinent surgical history  Length Of Stay: 3  PHYSICAL THERAPY EVALUATION :    05/13/22 0956   PT Last Visit   PT Visit Date 05/13/22   Note Type   Note type Evaluation   Pain Assessment   Pain Assessment Tool 0-10   Pain Score No Pain   Restrictions/Precautions   Weight Bearing Precautions Per Order No   Braces or Orthoses   (none)   Other Precautions Cognitive   Home Living   Type of [address] [facility] One level   Bathroom Toilet Standard   Bathroom Equipment   (denies)   Home Equipment   (none)   Additional Comments [address]   Prior Function   Level of [place] Independent with ADLs and functional mobility   Lives With Alone   Receives Help From Friend(s)   ADL Assistance Independent   IADLs Independent   Falls in the last 6 months 0   Vocational On disability   Comments pt reports being fully indep and able to [place] w/o restrictions; denies falls; uses public tranport; I w/ ADl's and is able to manage medications   General   Additional Pertinent History presenting w/ Afib; per EMR- cognitive impairment at baseline - unable to obtain medications therefore was unable to take for some time   CM made aware   Family/Caregiver Present No   Cognition   Overall Cognitive Status Impaired   Attention Within functional limits   Orientation Level Oriented X4   Memory Decreased recall of precautions   Following Commands Follows one step commands without difficulty   RUE Assessment   RUE Assessment WFL   LUE Assessment   LUE Assessment WFL   RLE Assessment   RLE Assessment WFL   LLE Assessment   LLE Assessment WFL   Vision-Basic Assessment Current Vision No visual deficits   Coordination   Movements are Fluid and Coordinated 1   Sensation WFL   Finger to Nose & Finger to Finger  Intact   Light Touch   RLE Light Touch Grossly intact   LLE Light Touch Grossly intact   Bed Mobility   Supine to Sit 5  Supervision   Additional Comments sits EOB w/o UE support w/o dizziness   Transfers   Sit to Stand 5  Supervision   Stand to Sit 5  Supervision   Additional items Impulsive   Toilet transfer 5  Supervision   Additional items Impulsive   Additional Comments impulsivity appears to be baseline- pt is pleasant and cooperative and talkative throughout- able to make needsknown   Ambulation/Elevation   Gait pattern Improper Weight shift  (inc lateral sway w/o LOB)   Gait Assistance 5  Supervision   Assistive Device None   Distance 120+120 (standing rest) on RA- Spo2 noted to drop to 84% min w/ ambualtino; pt denies dizziness or SOB- however did appear dyspnic - placed on 2Lo2 and recoverd to 92% w/i approx 3 mins  HR max w/ ambualtion 148 on tele - RN also aware   Stair Management Assistance Not tested   Balance   Static Sitting Fair +   Dynamic Sitting Fair   Static Standing Fair   Dynamic Standing Fair   Ambulatory Fair  (w/o AD)   Activity Tolerance   Activity Tolerance Patient tolerated treatment well   Medical Staff Made Aware yes- Co- tx was performed w/ OT today  This pt's participation in skilled therapies requires skilled Ax2 2*medical complexity; decreased activity tolerance; pain; limited endurance and for safety  For these reasons co- tx was indicated to ensure patient could safely and optimally participate in therapy services and maximize their rehabilitation during treatment time  PT and OT disciplines addressed separate goals of patient's individualized care plans throughout session  Nurse Made Aware yes   Assessment   Prognosis Good   Problem List Decreased endurance; Impaired balance;Decreased mobility; Impaired judgement;Decreased cognition;Decreased safety awareness; Obesity; Decreased skin integrity   Assessment Pt is 56 y o  male seen for PT evaluation s/p admit to [facility] on 5/10/2022  Pt presenting w/ afib w/ RVR   - noncompliant w/ medications 2* not being able to obtain (from EMR)- at baseline pt w/ cog impairment- lives in [facility] and ambulates indep and w/o AD community distances unrestricted distances and uses public transport  Reports having no outpt CM/  at baseline  Comorbidities affecting pt's physical performance at time of assessment listed above and significant for:  has a past medical history of A-fib ([address] ) and Hypertension    Personal factors affecting pt at time of IE include: steps to enter environment, limited home support, cognition impairment; financial and environmental barriers for d/c;  past experience, limited insight into impairments and recent decline in function/ endurance;  Due to acute medical issues, ongoing medical workup for primary dx; pain, fall risk, decreased activity tolerance compared to baseline, o2- tele monitoring for afib;  increased assistance needed from caregiver at current time, continuous telemetry monitoring, multiple lines, decline in overall functional mobility status; health management issues; note unstable clinical picture (high complexity)  Currently pt  is requiring S A for bed skills; S for functional transfers and S for ambulation limited distnances w/o AD w/ inc HR max and drop in Spo2 requiring placement on O2     Pt presents functioning below baseline and currently w/ overall mobility deficits 2* to: obesity;  generalized weakness/ deconditioning; decreased endurance; decreased activity tolerancegait deviations; decreased safety awareness; SOB/SCHERER; fatigue; impaired safety and judgement; limited insight into current deficits; obesity; multiple lines;(Please find additional objective findings from PT assessment regarding body systems outlined above ) Pt will continue to benefit from skilled PT interventions to address stated impairments; to maximize functional potential; for ongoing pt/ family training; and DME needs  Anticipate that with continued progress pt to d/c back to [address] and w/ home PT when medically cleared  Barriers to Discharge Inaccessible home environment;Decreased caregiver support   Goals   Patient Goals feel better   STG Expiration Date 05/27/22   Short Term Goal #1 In 14 days pt will complete: 1) Bed mobility skills with MI to facilitate safe return to previous living environment and decrease burden on caregivers  2) Functional transfers with MI/ indep  to facilitate safe return to previous living environment  3) Ambulation with least restrictive AD MI/ indep > 360' without LOB and stable vitals for safe ambulation in home/ community environment  4) Stair training up/ down flight step/s with 1 rail/s  and S for safe access to previous living environment and to increase community access  5)) PT for ongoing pt and family education; DME needs and D/C planning to promote highest level of function in least restrictive environment  PT Treatment Day 0   AM-PAC Basic Mobility Inpatient   Turning in Bed Without Bedrails 4   Lying on Back to Sitting on Edge of Flat Bed 4   Moving Bed to Chair 4   Standing Up From Chair 4   Walk in Room 4   Climb 3-5 Stairs 3   Basic Mobility Inpatient Raw Score 23   Basic Mobility Standardized Score 50 88   Highest Level Of Mobility   JH-HLM Goal 7: Walk 25 feet or more   JH-HLM Achieved 7: Walk 25 feet or more   Modified Morales Scale   Modified [place] Scale 1   End of Consult   Patient Position at End of Consult Bedside chair; All needs within reach     Pt is 56 y o  male seen for PT evaluation s/p admit to [facility] on 5/10/2022  Pt presenting w/ afib w/ RVR   - noncompliant w/ medications 2* not being able to obtain (from EMR)- at baseline pt w/ cog impairment- lives in [facility] and ambulates indep and w/o AD community distances unrestricted distances and uses public transport  Reports having no outpt CM/  at baseline  Comorbidities affecting pt's physical performance at time of assessment listed above and significant for:  has a past medical history of A-fib ([address] ) and Hypertension    Personal factors affecting pt at time of IE include: steps to enter environment, limited home support, cognition impairment; financial and environmental barriers for d/c;  past experience, limited insight into impairments and recent decline in function/ endurance;  Due to acute medical issues, ongoing medical workup for primary dx; pain, fall risk, decreased activity tolerance compared to baseline, o2- tele monitoring for afib;  increased assistance needed from caregiver at current time, continuous telemetry monitoring, multiple lines, decline in overall functional mobility status; health management issues; note unstable clinical picture (high complexity)  Currently pt  is requiring S A for bed skills; S for functional transfers and S for ambulation limited distnances w/o AD w/ inc HR max and drop in Spo2 requiring placement on O2  Pt presents functioning below baseline and currently w/ overall mobility deficits 2* to: obesity;  generalized weakness/ deconditioning; decreased endurance; decreased activity tolerancegait deviations; decreased safety awareness; SOB/SCHERER; fatigue; impaired safety and judgement; limited insight into current deficits; obesity; multiple lines;(Please find additional objective findings from PT assessment regarding body systems outlined above ) Pt will continue to benefit from skilled PT interventions to address stated impairments; to maximize functional potential; for ongoing pt/ family training; and DME needs  Anticipate that with continued progress pt to d/c back to [facility] and w/ home PT when medically cleared  The patient's AM-PAC Basic Mobility Inpatient Short Form Raw Score is 23   A Raw score of greater than 16 suggests the patient may benefit from discharge to home  Please also refer to the recommendation of the Physical Therapist for safe discharge planning    Meredith Davis PT

## 2022-05-13 NOTE — PROGRESS NOTES
INTERNAL MEDICINE RESIDENCY PROGRESS NOTE     Name: Sandrine   Age & Sex: 56 y o  male   MRN: [id number]  Unit/Bed#: [facility] 508-01   Encounter: [id number]  Team: SOD Team B     PATIENT INFORMATION     Name: Sandrine   Age & Sex: 56 y o  male   MRN: [id number]  Hospital Stay Days: 3    ASSESSMENT/PLAN     Principal Problem:    Atrial fibrillation with rapid ventricular response ([facility] )  Active Problems:    GLO [facility]acute kidney [facility]) [facility] )    Asthma    Cognitive deficits    Type 2 diabetes mellitus, without long-term current use of insulin (HCC)    Hyperlipidemia    Leg swelling    Hyponatremia      * Atrial fibrillation with rapid ventricular response ([facility] )  Assessment & Plan   Presented with A fib with RVR   Patient reports he is running out of Eliquis for a few weeks  He did not recall taking lopressor at home   EKG showed Afib with RVR   EFX2NC2-CERT= 2 (HTN, DM)   ECHO on Feb showed normal EF and no significant valvular disease  Repeat Echo on this admission EF 40%  Jey Zambrano Cardiology consult   Continue eliquis and digoxin  Will consider to discontinue digoxin if CrCl <15  Monitor renal function    Increase metoprolol to 100 mg bid as rate is not controlled currently  GLO (acute kidney injury) ([facility] )  Assessment & Plan  · Creatinine on admission 1 2  · Cr 2 35 > 3   · Given lasix due to volume overloaded state  · Avoid nephrotoxin and hypotension  · Nephrology consult  · Bladder scan 28 cc  · Renal US normal  · Patient seems to be volume overloaded  Cr worsening with lasix  · He received 2 doses of IV vancomycin on admission  · Likely prerenal due to low flow, AIN due to medications  Unlikely post renal as no obstruction noted on US  · Follow up with BMP in am    Hyponatremia  Assessment & Plan  · Na 131  · Patient looks volume overloaded  Given lasix, resulting GLO  · Serum osm 295  Urine osm 297     · Nephrology consult  · TSH 5 4 and free T4 1 08  · Continue to monitor Na  · Fluid restriction 1  2 L per day    Leg swelling  Assessment & Plan  · Chronic  L>R  VAS negative for DVT  · He was given IV vancomycin 2 doses for possible cellulitis  · Likely chronic venous stasis dermatitis  · His albumin on admission was 3 7  · TSH high 5 4, Free T 4 normal 1 08  · Lasix was given however creatinine worsen  · WBC up to 13 86 today  No fever  Some redness and petechiae noted on LLE  Warm to touch  · Will start empiric antibiotic with IV ceftriaxone  Penicillin allergy listed however patient and his father do not recall the allergy reaction  · Monitor clinically, fever and WBC    Hyperlipidemia  Assessment & Plan  · Continue statin     Type 2 diabetes mellitus, without long-term current use of insulin [facility])  Assessment & Plan  Lab Results   Component Value Date    HGBA1C 6 7 (H) 02/25/2022       Recent Labs     05/10/22  1753 05/10/22  2119 05/11/22  0555   POCGLU 119 135 89       Blood Sugar Average: Last 72 hrs:  (P) 283 [id number]     · Monitor BG  · Will add SSI if sugars are not at goal   Currently at goal without requiring insulin  · Holding home med metformin    Cognitive deficits  Assessment & Plan  · Baseline  Able to communicate clearly  · Live at group home  · He is relatively independent with ADLs  He uses the bus to go to the appointment  However he could not make an office appointment on his own  Will help to set up an appointment upon discharge  Asthma  Assessment & Plan  Stable on albuterol as needed      Disposition: Continue med surg with tele     SUBJECTIVE     Patient seen and examined  No acute events overnight  He reports he is feeling better  Denies any fever, chills, cough, any pain anywhere       OBJECTIVE     Vitals:    05/13/22 0600 05/13/22 0610 05/13/22 0725 05/13/22 1208   BP:  156/86 151/90 148/90   BP Location:       Pulse:  (!) 110 (!) 109    Resp:   18 18   Temp:   99 5 °F (37 5 °C)    TempSrc:       SpO2:   91%    Weight: 122 kg (269 lb 12 8 oz)      Height: Temperature:   Temp (24hrs), Av 1 °F (37 3 °C), Min:98 2 °F (36 8 °C), Max:99 5 °F (37 5 °C)    Temperature: 99 5 °F (37 5 °C)  Intake & Output:  I/O        07 07 07 07 07 0700    P  O  1230 1140 240    I V  (mL/kg) 700 7 (5 7) 300 9 (2 5)     IV Piggyback       Total Intake(mL/kg) 1930 7 (15 8) 1440 9 (11 8) 240 (2)    Urine (mL/kg/hr) 250 (0 1) 640 (0 2)     Total Output 250 640     Net +1680 7 +800 9 +240               Weights:   IBW (Ideal Body Weight): 73 kg    Body mass index is 38 71 kg/m²  Weight (last 2 days)     Date/Time Weight    22 0600 122 (269 8)    22 0530 122 (269)    22 0550 120 (265 4)        Physical Exam  LABORATORY DATA     Labs: I have personally reviewed pertinent reports  Results from last 7 days   Lab Units 22  0509 05/10/22  1323   WBC Thousand/uL 13 86* 8 40   HEMOGLOBIN g/dL 12 7 13 4   HEMATOCRIT % 44 5 45 9   PLATELETS Thousands/uL 205 220   NEUTROS PCT %  --  68   MONOS PCT %  --  12      Results from last 7 days   Lab Units 22  0509 22  0449 22  0000 05/10/22  1323   POTASSIUM mmol/L 4 6 4 6 3 9 3 7   CHLORIDE mmol/L 99* 102 104 103   CO2 mmol/L 25 22 20* 25   BUN mg/dL 35* 28* 19 17   CREATININE mg/dL 3 02* 2 35* 1 23 1 20   CALCIUM mg/dL 8 8 8 7 8 7 9 3   ALK PHOS U/L  --   --   --  94   ALT U/L  --   --   --  27   AST U/L  --   --   --  29     Results from last 7 days   Lab Units 22  0000   MAGNESIUM mg/dL 2 1          Results from last 7 days   Lab Units 22  0449 22  0236 05/10/22  2021 05/10/22  1325   INR   --   --   --  1 39*   PTT seconds 40* 70* 68* 35               IMAGING & DIAGNOSTIC TESTING     Radiology Results: I have personally reviewed pertinent reports  XR chest 1 view portable    Result Date: 5/10/2022  Impression: Borderline cardiomegaly  Mild vascular congestion  Bilateral pleural effusions   Workstation performed: [id number] kidney and bladder    Result Date: 5/13/2022  Impression: Normal  Workstation performed: [id number]     Other Diagnostic Testing: I have personally reviewed pertinent reports  ACTIVE MEDICATIONS     Current Facility-Administered Medications   Medication Dose Route Frequency    acetaminophen (TYLENOL) tablet 650 mg  650 mg Oral Q6H PRN    albuterol (PROVENTIL HFA,VENTOLIN HFA) inhaler 2 puff  2 puff Inhalation Q4H PRN    apixaban (ELIQUIS) tablet 5 mg  5 mg Oral BID    [START ON 5/14/2022] cefTRIAXone (ROCEPHIN) 2,000 mg in dextrose 5 % 50 mL IVPB  2,000 mg Intravenous Q24H    digoxin (LANOXIN) tablet 125 mcg  125 mcg Oral Daily    metoprolol (LOPRESSOR) injection 5 mg  5 mg Intravenous Q6H PRN    metoprolol tartrate (LOPRESSOR) tablet 100 mg  100 mg Oral Q12H [address]    metoprolol tartrate (LOPRESSOR) tablet 50 mg  50 mg Oral Once    pravastatin (PRAVACHOL) tablet 80 mg  80 mg Oral Daily With Dinner       VTE Pharmacologic Prophylaxis: Reason for no pharmacologic prophylaxis ON eliquis  VTE Mechanical Prophylaxis: sequential compression device    Portions of the record may have been created with voice recognition software  Occasional wrong word or "sound a like" substitutions may have occurred due to the inherent limitations of voice recognition software    Read the chart carefully and recognize, using context, where substitutions have occurred   ==  Chucky Galaviz MD  [facility]  Internal Medicine Residency PGY- 2

## 2022-05-14 PROBLEM — I50.9 ACUTE EXACERBATION OF CHF (CONGESTIVE HEART FAILURE) (HCC): Status: ACTIVE | Noted: 2022-05-14

## 2022-05-14 PROBLEM — E87.5 HYPERKALEMIA: Status: ACTIVE | Noted: 2022-05-14

## 2022-05-14 LAB
ALBUMIN SERPL BCP-MCNC: 3.2 G/DL (ref 3.5–5)
ALP SERPL-CCNC: 90 U/L (ref 46–116)
ALT SERPL W P-5'-P-CCNC: 31 U/L (ref 12–78)
ANION GAP SERPL CALCULATED.3IONS-SCNC: 3 MMOL/L (ref 4–13)
AST SERPL W P-5'-P-CCNC: 109 U/L (ref 5–45)
BASOPHILS # BLD AUTO: 0.03 THOUSANDS/ΜL (ref 0–0.1)
BASOPHILS NFR BLD AUTO: 0 % (ref 0–1)
BILIRUB SERPL-MCNC: 1.91 MG/DL (ref 0.2–1)
BUN SERPL-MCNC: 43 MG/DL (ref 5–25)
CALCIUM ALBUM COR SERPL-MCNC: 9.2 MG/DL (ref 8.3–10.1)
CALCIUM SERPL-MCNC: 8.6 MG/DL (ref 8.3–10.1)
CHLORIDE SERPL-SCNC: 97 MMOL/L (ref 100–108)
CHOLEST SERPL-MCNC: 89 MG/DL
CK MB SERPL-MCNC: <1 % (ref 0–2.5)
CK MB SERPL-MCNC: <1 NG/ML (ref 0–5)
CK SERPL-CCNC: 163 U/L (ref 39–308)
CO2 SERPL-SCNC: 27 MMOL/L (ref 21–32)
CREAT SERPL-MCNC: 3.22 MG/DL (ref 0.6–1.3)
EOSINOPHIL # BLD AUTO: 0.1 THOUSAND/ΜL (ref 0–0.61)
EOSINOPHIL NFR BLD AUTO: 1 % (ref 0–6)
ERYTHROCYTE [DISTWIDTH] IN BLOOD BY AUTOMATED COUNT: 17.3 % (ref 11.6–15.1)
GFR SERPL CREATININE-BSD FRML MDRD: 21 ML/MIN/1.73SQ M
GLUCOSE SERPL-MCNC: 116 MG/DL (ref 65–140)
GLUCOSE SERPL-MCNC: 117 MG/DL (ref 65–140)
GLUCOSE SERPL-MCNC: 119 MG/DL (ref 65–140)
GLUCOSE SERPL-MCNC: 124 MG/DL (ref 65–140)
GLUCOSE SERPL-MCNC: 281 MG/DL (ref 65–140)
HCT VFR BLD AUTO: 44.1 % (ref 36.5–49.3)
HDLC SERPL-MCNC: 17 MG/DL
HGB BLD-MCNC: 12.8 G/DL (ref 12–17)
IMM GRANULOCYTES # BLD AUTO: 0.11 THOUSAND/UL (ref 0–0.2)
IMM GRANULOCYTES NFR BLD AUTO: 1 % (ref 0–2)
LDLC SERPL CALC-MCNC: 52 MG/DL (ref 0–100)
LYMPHOCYTES # BLD AUTO: 0.56 THOUSANDS/ΜL (ref 0.6–4.47)
LYMPHOCYTES NFR BLD AUTO: 5 % (ref 14–44)
MAGNESIUM SERPL-MCNC: 2.2 MG/DL (ref 1.6–2.6)
MCH RBC QN AUTO: 23.5 PG (ref 26.8–34.3)
MCHC RBC AUTO-ENTMCNC: 29 G/DL (ref 31.4–37.4)
MCV RBC AUTO: 81 FL (ref 82–98)
MONOCYTES # BLD AUTO: 1.22 THOUSAND/ΜL (ref 0.17–1.22)
MONOCYTES NFR BLD AUTO: 10 % (ref 4–12)
NEUTROPHILS # BLD AUTO: 10.12 THOUSANDS/ΜL (ref 1.85–7.62)
NEUTS SEG NFR BLD AUTO: 83 % (ref 43–75)
NONHDLC SERPL-MCNC: 72 MG/DL
NRBC BLD AUTO-RTO: 0 /100 WBCS
PHOSPHATE SERPL-MCNC: 4.9 MG/DL (ref 2.7–4.5)
PLATELET # BLD AUTO: 210 THOUSANDS/UL (ref 149–390)
PMV BLD AUTO: 11.5 FL (ref 8.9–12.7)
POTASSIUM SERPL-SCNC: 5.7 MMOL/L (ref 3.5–5.3)
PROT SERPL-MCNC: 8.1 G/DL (ref 6.4–8.2)
RBC # BLD AUTO: 5.44 MILLION/UL (ref 3.88–5.62)
SODIUM SERPL-SCNC: 127 MMOL/L (ref 136–145)
TRIGL SERPL-MCNC: 99 MG/DL
WBC # BLD AUTO: 12.14 THOUSAND/UL (ref 4.31–10.16)

## 2022-05-14 PROCEDURE — 84100 ASSAY OF PHOSPHORUS: CPT | Performed by: INTERNAL MEDICINE

## 2022-05-14 PROCEDURE — 82948 REAGENT STRIP/BLOOD GLUCOSE: CPT

## 2022-05-14 PROCEDURE — 85025 COMPLETE CBC W/AUTO DIFF WBC: CPT | Performed by: INTERNAL MEDICINE

## 2022-05-14 PROCEDURE — 82550 ASSAY OF CK (CPK): CPT | Performed by: INTERNAL MEDICINE

## 2022-05-14 PROCEDURE — 80053 COMPREHEN METABOLIC PANEL: CPT | Performed by: INTERNAL MEDICINE

## 2022-05-14 PROCEDURE — 83735 ASSAY OF MAGNESIUM: CPT | Performed by: INTERNAL MEDICINE

## 2022-05-14 PROCEDURE — 80061 LIPID PANEL: CPT | Performed by: INTERNAL MEDICINE

## 2022-05-14 PROCEDURE — 82553 CREATINE MB FRACTION: CPT | Performed by: INTERNAL MEDICINE

## 2022-05-14 PROCEDURE — 99233 SBSQ HOSP IP/OBS HIGH 50: CPT | Performed by: INTERNAL MEDICINE

## 2022-05-14 PROCEDURE — 99232 SBSQ HOSP IP/OBS MODERATE 35: CPT | Performed by: INTERNAL MEDICINE

## 2022-05-14 RX ORDER — DEXTROSE MONOHYDRATE 25 G/50ML
25 INJECTION, SOLUTION INTRAVENOUS ONCE
Status: COMPLETED | OUTPATIENT
Start: 2022-05-14 | End: 2022-05-14

## 2022-05-14 RX ORDER — INSULIN LISPRO 100 [IU]/ML
1-6 INJECTION, SOLUTION INTRAVENOUS; SUBCUTANEOUS
Status: DISCONTINUED | OUTPATIENT
Start: 2022-05-14 | End: 2022-05-17

## 2022-05-14 RX ORDER — SODIUM POLYSTYRENE SULFONATE 4.1 MEQ/G
15 POWDER, FOR SUSPENSION ORAL; RECTAL ONCE
Status: COMPLETED | OUTPATIENT
Start: 2022-05-14 | End: 2022-05-14

## 2022-05-14 RX ORDER — METOPROLOL TARTRATE 50 MG/1
100 TABLET, FILM COATED ORAL 3 TIMES DAILY
Status: DISCONTINUED | OUTPATIENT
Start: 2022-05-14 | End: 2022-05-17

## 2022-05-14 RX ORDER — FUROSEMIDE 10 MG/ML
80 INJECTION INTRAMUSCULAR; INTRAVENOUS 2 TIMES DAILY
Status: DISCONTINUED | OUTPATIENT
Start: 2022-05-14 | End: 2022-05-16

## 2022-05-14 RX ADMIN — DEXTROSE MONOHYDRATE 25 ML: 25 INJECTION, SOLUTION INTRAVENOUS at 09:10

## 2022-05-14 RX ADMIN — APIXABAN 5 MG: 5 TABLET, FILM COATED ORAL at 18:11

## 2022-05-14 RX ADMIN — METOPROLOL TARTRATE 100 MG: 50 TABLET, FILM COATED ORAL at 18:11

## 2022-05-14 RX ADMIN — DIGOXIN 125 MCG: 125 TABLET ORAL at 09:08

## 2022-05-14 RX ADMIN — INSULIN LISPRO 4 UNITS: 100 INJECTION, SOLUTION INTRAVENOUS; SUBCUTANEOUS at 09:08

## 2022-05-14 RX ADMIN — METOPROLOL TARTRATE 100 MG: 50 TABLET, FILM COATED ORAL at 09:08

## 2022-05-14 RX ADMIN — APIXABAN 5 MG: 5 TABLET, FILM COATED ORAL at 09:08

## 2022-05-14 RX ADMIN — DEXTROSE 2000 MG: 50 INJECTION, SOLUTION INTRAVENOUS at 12:02

## 2022-05-14 RX ADMIN — PRAVASTATIN SODIUM 80 MG: 80 TABLET ORAL at 18:12

## 2022-05-14 RX ADMIN — INSULIN HUMAN 10 UNITS: 100 INJECTION, SOLUTION PARENTERAL at 09:09

## 2022-05-14 RX ADMIN — ACETAMINOPHEN 650 MG: 325 TABLET, FILM COATED ORAL at 02:58

## 2022-05-14 RX ADMIN — METOPROLOL TARTRATE 100 MG: 50 TABLET, FILM COATED ORAL at 21:49

## 2022-05-14 RX ADMIN — ACETAMINOPHEN 650 MG: 325 TABLET, FILM COATED ORAL at 18:12

## 2022-05-14 RX ADMIN — SODIUM POLYSTYRENE SULFONATE 15 G: 4.1 POWDER, FOR SUSPENSION ORAL; RECTAL at 09:10

## 2022-05-14 RX ADMIN — FUROSEMIDE 80 MG: 10 INJECTION, SOLUTION INTRAMUSCULAR; INTRAVENOUS at 21:49

## 2022-05-14 RX ADMIN — FUROSEMIDE 80 MG: 10 INJECTION, SOLUTION INTRAMUSCULAR; INTRAVENOUS at 12:56

## 2022-05-14 NOTE — ASSESSMENT & PLAN NOTE
Wt Readings from Last 3 Encounters:   05/14/22 121 kg (267 lb 10 2 oz)   02/25/22 116 kg (255 lb)   02/26/15 110 kg (242 lb 4 oz)     · ECHO on Feb showed normal EF and no significant valvular disease  Repeat Echo on this admission EF 40%  · Likely tachyarrhythmia mediated in the setting of A fib with RVR  · Patient appears to be volume overloaded  · Cardiology consulted  · Nephrology started IV lasix 80 mg bid on 5/14/22 and transitioned to po torsemide on 5/17/22  · On metoprolol tartrate for A Fib  · Not on ACEI/ARB due to GLO  · Daily weights  · Is & Os    Net -2 9 L over 24 hours  · Weights down to 242 from 265 lb  · Salt and fluid restriction
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
## 2022-05-14 NOTE — ASSESSMENT & PLAN NOTE
· K 3 4 this am  · Hypokalemia due to diuresis  · Replaced potassium and mag   Keep K>4 and Mg >2  · Monitor BMP

## 2022-05-14 NOTE — PROGRESS NOTES
INTERNAL MEDICINE RESIDENCY PROGRESS NOTE     Name: Sandrine   Age & Sex: 56 y o  male   MRN: [id number]  Unit/Bed#: [facility] 508-01   Encounter: [id number]  Team: SOD Team B     PATIENT INFORMATION     Name: Sandrine   Age & Sex: 56 y o  male   MRN: [id number]  Hospital Stay Days: 4    ASSESSMENT/PLAN     Principal Problem:    Atrial fibrillation with rapid ventricular response ([address] )  Active Problems:    GLO (acute kidney injury) ([facility] )    Hyperkalemia    Acute exacerbation of CHF (congestive heart failure) ([facility] )    Asthma    Cognitive deficits    Type 2 diabetes mellitus, without long-term current use of insulin ([facility])    Hyperlipidemia    Leg swelling    Hyponatremia      * Atrial fibrillation with rapid ventricular response ([facility] [address] [address] )  Assessment & Plan   Presented with A fib with RVR   Patient reports he is running out of Eliquis for a few weeks  He did not recall taking lopressor at home   EKG showed Afib with RVR   [id number]= 2 (HTN, DM)   ECHO on Feb showed normal EF and no significant valvular disease  Repeat Echo on this admission EF 40%  [facility] consulted   Rate is not controlled   Increased metoprolol to 100 mg tid   Not started cardizem due to low EF   Discontinue digoxin due to hyperkalemia   IV metoprolol prn for HR >120   Cannot undergo chemical or JET/cardioversion as patient is not compliance with medications, and underlying cognitive disability  Acute exacerbation of CHF (congestive heart failure) ([facility])  Assessment & Plan  Wt Readings from Last 3 Encounters:   05/14/22 121 kg (267 lb 10 2 oz)   02/25/22 116 kg (255 lb)   02/26/15 110 kg (242 lb 4 oz)     · ECHO on Feb showed normal EF and no significant valvular disease  Repeat Echo on this admission EF 40%    · Likely tachyarrhythmia mediated  · Patient appears to be volume overloaded  · Cardiology consulted  · Nephrology started IV lasix 80 mg bid on 5/14/22  · On metoprolol tartrate for A Fib  · Not on ACEI/ARB due to GLO  · Daily weights  · Is & Os  · Salt and fluid restriction         Hyperkalemia  Assessment & Plan  · K 5 7 (specimen was moderately hemolyzed)  · Tele: afib with RVR  · Gave IV insulin 10 unit and dextrose and 15 mg oral kayalexate  · Discontinue digoxin  · Nephrology is following  · Follow up with [facility]    GLO (acute kidney injury) ([facility] [address] )  Assessment & Plan  · Creatinine on admission 1 2  · Cr 2 35 > 3 > 3 2  · Given lasix due to volume overloaded state  · Avoid nephrotoxin and hypotension  · UA showed 1-2 RBC, 3-4 WBC and multiple hyaline casts  Urine eosinophil 0%  · Bladder scan 28 cc  · Renal US normal  · ESR and CRP are elevated  · Patient seems to be volume overloaded  Cr worsening with lasix  · He received 2 doses of IV vancomycin on admission  · Likely prerenal due to cardiorenal vs AIN due to medications (s/p vancomycin 2 doses) vs ?vasculitis  Unlikely post renal as no obstruction noted on US  · Nephrology is following  · Follow up with [facility]     Hyponatremia  Assessment & Plan  · Na 131 >> 127 today  · Patient looks volume overloaded  Given lasix, resulting GLO  · Serum osm 295  Urine osm 297  Urine Na 19  · Nephrology consult  · TSH 5 4 and free T4 1 08  Lipid panel pending  · Continue to monitor Na  · Hypervolemic isotonic hyponatremia in the setting of acute CHF exacerbation  · Fluid restriction 1 2 L per day    Leg swelling  Assessment & Plan  · Chronic  L>R  VAS negative for DVT  · He was given IV vancomycin 2 doses for possible cellulitis  · Likely chronic venous stasis dermatitis  · His albumin on admission was 3 7  · TSH high 5 4, Free T 4 normal 1 08  · Lasix was given however creatinine worsen  · WBC up to 13 86 today  No fever  Some redness and petechiae noted on LLE  Warm to touch  · Monitor clinically, fever and WBC  · Continue IV ceftriaxone, tolerating well, no reaction  Procalcitonin 1 73       Hyperlipidemia  Assessment & Plan  · Continue statin     Type 2 diabetes mellitus, without long-term current use of insulin [facility])  Assessment & Plan  Lab Results   Component Value Date    HGBA1C 6 7 (H) 2022       Recent Labs     22  0905   POCGLU 281*       Blood Sugar Average: Last 72 hrs:  (P) 185     · Monitor BG   · Holding home med metformin  · SSI and accucheck  · Hypoglycemic protocol    Cognitive deficits  Assessment & Plan  · Baseline  Able to communicate clearly  · Live at group home  · He is relatively independent with ADLs  He uses the bus to go to the appointment  However he could not make an office appointment on his own  Will help to set up an appointment upon discharge  Asthma  Assessment & Plan  Stable on albuterol as needed      Disposition: Continue med surg with tele     SUBJECTIVE     Patient seen and examined  No acute events overnight  He reports he is feeling better  Denies any fever, chills, cough, any pain anywhere  OBJECTIVE     Vitals:    22 0349 22 0350 22 0715 22 1041   BP:   140/90 139/89   BP Location:       Pulse: (!) 108 (!) 109 (!) 111 (!) 115   Resp:       Temp:   97 9 °F (36 6 °C) 98 °F (36 7 °C)   TempSrc:       SpO2: 94% 95% 96% 94%   Weight:       Height:          Temperature:   Temp (24hrs), Av 4 °F (36 9 °C), Min:97 9 °F (36 6 °C), Max:99 5 °F (37 5 °C)    Temperature: 98 °F (36 7 °C)  Intake & Output:  I/O        07 0700  0701   0700  0701   0700    P  O  1230 1140 240    I V  (mL/kg) 700 7 (5 7) 300 9 (2 5)     IV Piggyback       Total Intake(mL/kg) 1930 7 (15 8) 1440 9 (11 8) 240 (2)    Urine (mL/kg/hr) 250 (0 1) 640 (0 2)     Total Output 250 640     Net +1680 7 +800 9 +240               Weights:   IBW (Ideal Body Weight): 73 kg    Body mass index is 38 4 kg/m²  Weight (last 2 days)     Date/Time Weight    22 0256 121 (267 64)    22 0600 122 (269 8)    22 0530 122 (269)        Physical Exam  Vitals and nursing note reviewed  Constitutional:       General: He is not in acute distress  Appearance: He is well-developed  He is obese  He is not ill-appearing  HENT:      Head: Normocephalic and atraumatic  Eyes:      Conjunctiva/sclera: Conjunctivae normal    Neck:      Vascular: JVD (up to ear) present  Cardiovascular:      Rate and Rhythm: Tachycardia present  Rhythm irregular  Pulses: Normal pulses  Heart sounds: Normal heart sounds  No murmur heard  No gallop  Pulmonary:      Effort: Pulmonary effort is normal  No respiratory distress  Breath sounds: Rales present  No wheezing  Abdominal:      General: Bowel sounds are normal  There is no distension  Palpations: Abdomen is soft  Tenderness: There is no abdominal tenderness  Musculoskeletal:         General: No tenderness  Cervical back: Neck supple  Right lower le+ Pitting Edema present  Left lower le+ Pitting Edema present  Skin:     General: Skin is warm and dry  Findings: Rash (on left upper thigh, warm to touch, no tenderness) present  Neurological:      General: No focal deficit present  Mental Status: He is alert  Psychiatric:         Mood and Affect: Mood normal          Behavior: Behavior normal        LABORATORY DATA     Labs: I have personally reviewed pertinent reports    Results from last 7 days   Lab Units 22  0242 22  0509 05/10/22  1323   WBC Thousand/uL 12 14* 13 86* 8 40   HEMOGLOBIN g/dL 12 8 12 7 13 4   HEMATOCRIT % 44 1 44 5 45 9   PLATELETS Thousands/uL 210 205 220   NEUTROS PCT % 83*  --  68   MONOS PCT % 10  --  12      Results from last 7 days   Lab Units 22  0242 22  0509 22  0449 22  0000 05/10/22  1323   POTASSIUM mmol/L 5 7* 4 6 4 6   < > 3 7   CHLORIDE mmol/L 97* 99* 102   < > 103   CO2 mmol/L 27 25 22   < > 25   BUN mg/dL 43* 35* 28*   < > 17   CREATININE mg/dL 3 22* 3 02* 2 35*   < > 1 20   CALCIUM mg/dL 8 6 8 8 8 7   < > 9 3   ALK PHOS U/L 90  --   --   --  94   ALT U/L 31  --   --   --  27   AST U/L 109*  --   --   --  29    < > = values in this interval not displayed  Results from last 7 days   Lab Units 05/14/22  0242 05/11/22  0000   MAGNESIUM mg/dL 2 2 2 1     Results from last 7 days   Lab Units 05/14/22  0242   PHOSPHORUS mg/dL 4 9*      Results from last 7 days   Lab Units 05/12/22  0449 05/11/22  0236 05/10/22  2021 05/10/22  1325   INR   --   --   --  1 39*   PTT seconds 40* 70* 68* 35               IMAGING & DIAGNOSTIC TESTING     Radiology Results: I have personally reviewed pertinent reports  XR chest 1 view portable    Result Date: 5/10/2022  Impression: Borderline cardiomegaly  Mild vascular congestion  Bilateral pleural effusions  Workstation performed: [email or web address] kidney and bladder    Result Date: 5/13/2022  Impression: Normal  Workstation performed: [id number]     Other Diagnostic Testing: I have personally reviewed pertinent reports      ACTIVE MEDICATIONS     Current Facility-Administered Medications   Medication Dose Route Frequency    acetaminophen (TYLENOL) tablet 650 mg  650 mg Oral Q6H PRN    albuterol (PROVENTIL HFA,VENTOLIN HFA) inhaler 2 puff  2 puff Inhalation Q4H PRN    apixaban (ELIQUIS) tablet 5 mg  5 mg Oral BID    cefTRIAXone (ROCEPHIN) 2,000 mg in dextrose 5 % 50 mL IVPB  2,000 mg Intravenous Q24H    furosemide (LASIX) injection 80 mg  80 mg Intravenous BID    insulin lispro (HumaLOG) 100 units/mL subcutaneous injection 1-6 Units  1-6 Units Subcutaneous TID AC    insulin lispro (HumaLOG) 100 units/mL subcutaneous injection 1-6 Units  1-6 Units Subcutaneous HS    metoprolol (LOPRESSOR) injection 5 mg  5 mg Intravenous Q6H PRN    metoprolol tartrate (LOPRESSOR) tablet 100 mg  100 mg Oral TID    pravastatin (PRAVACHOL) tablet 80 mg  80 mg Oral Daily With Dinner       VTE Pharmacologic Prophylaxis: Reason for no pharmacologic prophylaxis ON eliquis  VTE Mechanical Prophylaxis: sequential compression device    Portions of the record may have been created with voice recognition software  Occasional wrong word or "sound a like" substitutions may have occurred due to the inherent limitations of voice recognition software    Read the chart carefully and recognize, using context, where substitutions have occurred   ==  Chucky Soriano MD  [address]  Internal Medicine Residency PGY- 2

## 2022-05-14 NOTE — PROGRESS NOTES
NEPHROLOGY PROGRESS NOTE   Sandrine 56 y o  male MRN: [id number]  Unit/Bed#: [facility] 508-01 Encounter: [id number]  Reason for Consult: GLO     ASSESSMENT AND PLAN:  Patient is 27-year-old male with significant medical issues of hypertension, diabetes, AFib, documented cognitive impairment, presented with palpitation and found to have AFib with RVR   We are consulted for GLO management          GLO, baseline creatinine around 1 1    -creatinine 1 2 on admission now significantly worsening up to 3 2 today although rate of rise in creatinine seems to have reduced over last 24 hours  -GLO suspected secondary to AFib with RVR causing hemodynamic insult, component of volume overload   -bladder scan nonsignificant   -Lasix as below   -UA shows 2+ proteinuria, 1 to 2 RBCs, 2 to 4 WBCs, multiple hyaline cast     -urine sodium 19   -good urine output with Lasix challenge over last 24 hours  Bladder scan nonsignificant    -renal ultrasound shows normal size kidneys, normal echogenicity or hydronephrosis  -avoid any nephrotoxins or NSAIDs    -no emergent indication for dialysis yet    -status post initial vancomycin due to concern for cellulitis as per primary team   Now remains off antibiotic  No peripheral eosinophilia  Urine eosinophil 0%        Hypoxic respiratory failure, currently remains on 4 L O2 via nasal cannula    -echo shows EF 40%, normal IVC, technically difficult study    -clinically seems volume overloaded    -chest x-ray concerning for mild pulmonary congestion   -will give Lasix 80 mg IV b i d   -daily weight, accurate intake and output    -upc ratio 750 mg  serum albumin 3 2       Leg edema, venous Doppler negative for any DVT      -Lasix as above         AFib with RVR, management as per Cardiology         Hyponatremia    -serum sodium slowly dropping 127, suspect hypervolemic     -strict fluid restriction 1 2 L per day recommended     -Lasix as above   -serum osmolality 295,, urine osmolality 297, urine sodium as above     Mild hyperkalemia, serum potassium 5 7, moderately hemolyzed specimen, monitor with diuresis  BMP in a m        Discussed above plan in detail with primary team       SUBJECTIVE:  Patient seen and examined at bedside  Denies chest pain, nausea, vomiting, abdominal pain        OBJECTIVE:  Current Weight: Weight - Scale: 121 kg (267 lb 10 2 oz)  Vitals:    05/14/22 1041   BP: 139/89   Pulse: (!) 115   Resp:    Temp: 98 °F (36 7 °C)   SpO2: 94%       Intake/Output Summary (Last 24 hours) at 5/14/2022 1203  Last data filed at 5/14/2022 0900  Gross per 24 hour   Intake 480 ml   Output 1775 ml   Net -1295 ml     Wt Readings from Last 3 Encounters:   05/14/22 121 kg (267 lb 10 2 oz)   02/25/22 116 kg (255 lb)   02/26/15 110 kg (242 lb 4 oz)     Temp Readings from Last 3 Encounters:   05/14/22 98 °F (36 7 °C)   02/24/22 97 8 °F (36 6 °C) (Oral)   02/26/15 (!) 95 9 °F (35 5 °C)     BP Readings from Last 3 Encounters:   05/14/22 139/89   02/25/22 127/76   02/26/15 130/84     Pulse Readings from Last 3 Encounters:   05/14/22 (!) 115   02/25/22 102   02/26/15 88        Physical Examination:  General:  Lying in bed, no acute distress   Eyes:  No conjunctival pallor present  ENT:  External examination of ears and nose unremarkable  Neck:  No obvious lymphadenopathy appreciated  Respiratory:  Decreased breath sound at base  CVS:  S1, S2 present  GI:  Soft, nondistended  CNS:  Active alert oriented x3  Skin:  No new rash  Musculoskeletal:  No obvious new gross deformity noted    Medications:    Current Facility-Administered Medications:     acetaminophen (TYLENOL) tablet 650 mg, 650 mg, Oral, Q6H PRN, Fausto Lopez MD, 650 mg at 05/14/22 0258    albuterol (PROVENTIL HFA,VENTOLIN HFA) inhaler 2 puff, 2 puff, Inhalation, Q4H PRN, Fausto Lopez MD, 2 puff at 05/11/22 0240    apixaban (ELIQUIS) tablet 5 mg, 5 mg, Oral, BID, Salvador Singh MD, 5 mg at 05/14/22 0908    cefTRIAXone (ROCEPHIN) 2,000 mg in dextrose 5 % 50 mL IVPB, 2,000 mg, Intravenous, Q24H, Cony Cid MD, Last Rate: 100 mL/hr at 05/14/22 1202, 2,000 mg at 05/14/22 1202    insulin lispro (HumaLOG) 100 units/mL subcutaneous injection 1-6 Units, 1-6 Units, Subcutaneous, TID AC, 4 Units at 05/14/22 0908 **AND** Fingerstick Glucose (POCT), , , TID AC, Ei Jc Handy MD    insulin lispro (HumaLOG) 100 units/mL subcutaneous injection 1-6 Units, 1-6 Units, Subcutaneous, HS, Ei Jc Handy MD    metoprolol (LOPRESSOR) injection 5 mg, 5 mg, Intravenous, Q6H PRN, Cony Cid MD    metoprolol tartrate (LOPRESSOR) tablet 100 mg, 100 mg, Oral, TID, Cony Cid MD    pravastatin (PRAVACHOL) tablet 80 mg, 80 mg, Oral, Daily With Dora Salguero MD, 80 mg at 05/13/22 1551    Laboratory Results:  Results from last 7 days   Lab Units 05/14/22  0242 05/13/22  0509 05/12/22  0449 05/11/22  0000 05/10/22  1323   WBC Thousand/uL 12 14* 13 86*  --   --  8 40   HEMOGLOBIN g/dL 12 8 12 7  --   --  13 4   HEMATOCRIT % 44 1 44 5  --   --  45 9   PLATELETS Thousands/uL 210 205  --   --  220   SODIUM mmol/L 127* 131* 131* 134* 136   POTASSIUM mmol/L 5 7* 4 6 4 6 3 9 3 7   CHLORIDE mmol/L 97* 99* 102 104 103   CO2 mmol/L 27 25 22 20* 25   BUN mg/dL 43* 35* 28* 19 17   CREATININE mg/dL 3 22* 3 02* 2 35* 1 23 1 20   CALCIUM mg/dL 8 6 8 8 8 7 8 7 9 3   MAGNESIUM mg/dL 2 2  --   --  2 1  --    PHOSPHORUS mg/dL 4 9*  --   --   --   --        XR chest pa & lateral   Final Result by Marco Antonio Norris MD (05/13 8267)   Mild interstitial pulmonary edema and basilar loculated effusions        Workstation performed: [id number]          kidney and bladder   Final Result by Marylene Gala, MD (05/13 9794)      Normal              Workstation performed: [id number]         VAS lower limb venous duplex study, complete bilateral   Final Result by Josefina Partida MD (05/10 2112)      XR chest 1 view portable   Final Result by Vance De León MD (05/10 9036)      Borderline cardiomegaly  Mild vascular congestion  Bilateral pleural effusions  Workstation performed: [id number]             Portions of the record may have been created with voice recognition software  Occasional wrong word or "sound a like" substitutions may have occurred due to the inherent limitations of voice recognition software  Read the chart carefully and recognize, using context, where substitutions have occurred

## 2022-05-14 NOTE — PLAN OF CARE
Problem: Potential for Falls  Goal: Patient will remain free of falls  Description: INTERVENTIONS:  - Educate patient/family on patient safety including physical limitations  - Instruct patient to call for assistance with activity   - Consult OT/PT to assist with strengthening/mobility   - Keep Call bell within reach  - Keep bed low and locked with side rails adjusted as appropriate  - Keep care items and personal belongings within reach  - Initiate and maintain comfort rounds  - Make Fall Risk Sign visible to staff  - Offer Toileting every  Hours, in advance of need  - Initiate/Maintain alarm  - Obtain necessary fall risk management equipment:   - Apply yellow socks and bracelet for high fall risk patients  - Consider moving patient to room near nurses station  Outcome: Progressing     Problem: PAIN - ADULT  Goal: Verbalizes/displays adequate comfort level or baseline comfort level  Description: Interventions:  - Encourage patient to monitor pain and request assistance  - Assess pain using appropriate pain scale  - Administer analgesics based on type and severity of pain and evaluate response  - Implement non-pharmacological measures as appropriate and evaluate response  - Consider cultural and social influences on pain and pain management  - Notify physician/advanced practitioner if interventions unsuccessful or patient reports new pain  Outcome: Progressing     Problem: INFECTION - ADULT  Goal: Absence or prevention of progression during hospitalization  Description: INTERVENTIONS:  - Assess and monitor for signs and symptoms of infection  - Monitor lab/diagnostic results  - Monitor all insertion sites, i e  indwelling lines, tubes, and drains  - Monitor endotracheal if appropriate and nasal secretions for changes in amount and color  - [place] appropriate cooling/warming therapies per order  - Administer medications as ordered  - Instruct and encourage patient and family to use good hand hygiene technique  - Identify and instruct in appropriate isolation precautions for identified infection/condition  Outcome: Progressing  Goal: Absence of fever/infection during neutropenic period  Description: INTERVENTIONS:  - Monitor WBC    Outcome: Progressing     Problem: SAFETY ADULT  Goal: Patient will remain free of falls  Description: INTERVENTIONS:  - Educate patient/family on patient safety including physical limitations  - Instruct patient to call for assistance with activity   - Consult OT/PT to assist with strengthening/mobility   - Keep Call bell within reach  - Keep bed low and locked with side rails adjusted as appropriate  - Keep care items and personal belongings within reach  - Initiate and maintain comfort rounds  - Make Fall Risk Sign visible to staff  - Offer Toileting every  Hours, in advance of need  - Initiate/Maintain alarm  - Obtain necessary fall risk management equipment:   - Apply yellow socks and bracelet for high fall risk patients  - Consider moving patient to room near nurses station  Outcome: Progressing  Goal: Maintain or return to baseline ADL function  Description: INTERVENTIONS:  -  Assess patient's ability to carry out ADLs; assess patient's baseline for ADL function and identify physical deficits which impact ability to perform ADLs (bathing, care of mouth/teeth, toileting, grooming, dressing, etc )  - Assess/evaluate cause of self-care deficits   - Assess range of motion  - Assess patient's mobility; develop plan if impaired  - Assess patient's need for assistive devices and provide as appropriate  - Encourage maximum independence but intervene and supervise when necessary  - Involve family in performance of ADLs  - Assess for home care needs following discharge   - Consider OT consult to assist with ADL evaluation and planning for discharge  - Provide patient education as appropriate  Outcome: Progressing  Goal: Maintains/Returns to pre admission functional level  Description: INTERVENTIONS:  - Perform BMAT or MOVE assessment daily    - Set and communicate daily mobility goal to care team and patient/family/caregiver  - Collaborate with rehabilitation services on mobility goals if consulted  - Perform Range of Motion  times a day  - Reposition patient every  hours  - Dangle patient  times a day  - Stand patient  times a day  - Ambulate patient  times a day  - Out of bed to chair  times a day   - Out of bed for meals  times a day  - Out of bed for toileting  - Record patient progress and toleration of activity level   Outcome: Progressing     Problem: DISCHARGE PLANNING  Goal: Discharge to home or other facility with appropriate resources  Description: INTERVENTIONS:  - Identify barriers to discharge w/patient and caregiver  - Arrange for needed discharge resources and transportation as appropriate  - Identify discharge learning needs (meds, wound care, etc )  - Arrange for interpretive services to assist at discharge as needed  - Refer to Case Management Department for coordinating discharge planning if the patient needs post-hospital services based on physician/advanced practitioner order or complex needs related to functional status, cognitive ability, or social support system  Outcome: Progressing     Problem: Knowledge Deficit  Goal: Patient/family/caregiver demonstrates understanding of disease process, treatment plan, medications, and discharge instructions  Description: Complete learning assessment and assess knowledge base    Interventions:  - Provide teaching at level of understanding  - Provide teaching via preferred learning methods  Outcome: Progressing     Problem: CARDIOVASCULAR - ADULT  Goal: Maintains optimal cardiac output and hemodynamic stability  Description: INTERVENTIONS:  - Monitor I/O, vital signs and rhythm  - Monitor for S/S and trends of decreased cardiac output  - Administer and titrate ordered vasoactive medications to optimize hemodynamic stability  - Assess quality of pulses, skin color and temperature  - Assess for signs of decreased coronary artery perfusion  - Instruct patient to report change in severity of symptoms  Outcome: Progressing  Goal: Absence of cardiac dysrhythmias or at baseline rhythm  Description: INTERVENTIONS:  - Continuous cardiac monitoring, vital signs, obtain 12 lead EKG if ordered  - Administer antiarrhythmic and heart rate control medications as ordered  - Monitor electrolytes and administer replacement therapy as ordered  Outcome: Progressing     Problem: SKIN/TISSUE INTEGRITY - ADULT  Goal: Skin Integrity remains intact(Skin Breakdown Prevention)  Description: Assess:  -Perform Rico assessment every   -Clean and moisturize skin every   -Inspect skin when repositioning, toileting, and assisting with ADLS  -Assess under medical devices such as  every   -Assess extremities for adequate circulation and sensation     Bed Management:  -Have minimal linens on bed & keep smooth, unwrinkled  -Change linens as needed when moist or perspiring  -Avoid sitting or lying in one position for more than  hours while in bed  -Keep HOB at degrees     Toileting:  -Offer bedside commode  -Assess for incontinence every   -Use incontinent care products after each incontinent episode such as     Activity:  -Mobilize patient  times a day  -Encourage activity and walks on unit  -Encourage or provide ROM exercises   -Turn and reposition patient every  Hours  -Use appropriate equipment to lift or move patient in bed  -Instruct/ Assist with weight shifting every  when out of bed in chair  -Consider limitation of chair time  hour intervals    Skin Care:  -Avoid use of baby powder, tape, friction and shearing, hot water or constrictive clothing  -Relieve pressure over bony prominences using   -Do not massage red bony areas    Next Steps:  -Teach patient strategies to minimize risks such as    -Consider consults to  interdisciplinary teams such as   Outcome: Progressing  Goal: Incision(s), wounds(s) or drain site(s) healing without S/S of infection  Description: INTERVENTIONS  - Assess and document dressing, incision, wound bed, drain sites and surrounding tissue  - Provide patient and family education  - Perform skin care/dressing changes every   Outcome: Progressing  Goal: Pressure injury heals and does not worsen  Description: Interventions:  - Implement low air loss mattress or specialty surface (Criteria met)  - Apply silicone foam dressing  - Instruct/assist with weight shifting every  minutes when in chair   - Limit chair time to  hour intervals  - Use special pressure reducing interventions such as  when in chair   - Apply fecal or urinary incontinence containment device   - Perform passive or active ROM every   - Turn and reposition patient & offload bony prominences every  hours   - Utilize friction reducing device or surface for transfers   - Consider consults to  interdisciplinary teams such as   - Use incontinent care products after each incontinent episode such as  - Consider nutrition services referral as needed  Outcome: Progressing

## 2022-05-15 LAB
ANION GAP SERPL CALCULATED.3IONS-SCNC: 10 MMOL/L (ref 4–13)
BASOPHILS # BLD AUTO: 0.03 THOUSANDS/ΜL (ref 0–0.1)
BASOPHILS NFR BLD AUTO: 0 % (ref 0–1)
BUN SERPL-MCNC: 45 MG/DL (ref 5–25)
CALCIUM SERPL-MCNC: 9.2 MG/DL (ref 8.3–10.1)
CHLORIDE SERPL-SCNC: 95 MMOL/L (ref 100–108)
CO2 SERPL-SCNC: 28 MMOL/L (ref 21–32)
CREAT SERPL-MCNC: 3.11 MG/DL (ref 0.6–1.3)
EOSINOPHIL # BLD AUTO: 0.3 THOUSAND/ΜL (ref 0–0.61)
EOSINOPHIL NFR BLD AUTO: 3 % (ref 0–6)
ERYTHROCYTE [DISTWIDTH] IN BLOOD BY AUTOMATED COUNT: 16.9 % (ref 11.6–15.1)
GFR SERPL CREATININE-BSD FRML MDRD: 22 ML/MIN/1.73SQ M
GLUCOSE SERPL-MCNC: 100 MG/DL (ref 65–140)
GLUCOSE SERPL-MCNC: 127 MG/DL (ref 65–140)
GLUCOSE SERPL-MCNC: 133 MG/DL (ref 65–140)
GLUCOSE SERPL-MCNC: 145 MG/DL (ref 65–140)
GLUCOSE SERPL-MCNC: 149 MG/DL (ref 65–140)
HCT VFR BLD AUTO: 42.7 % (ref 36.5–49.3)
HGB BLD-MCNC: 12.7 G/DL (ref 12–17)
IMM GRANULOCYTES # BLD AUTO: 0.07 THOUSAND/UL (ref 0–0.2)
IMM GRANULOCYTES NFR BLD AUTO: 1 % (ref 0–2)
LYMPHOCYTES # BLD AUTO: 0.7 THOUSANDS/ΜL (ref 0.6–4.47)
LYMPHOCYTES NFR BLD AUTO: 7 % (ref 14–44)
MAGNESIUM SERPL-MCNC: 2 MG/DL (ref 1.6–2.6)
MCH RBC QN AUTO: 23.4 PG (ref 26.8–34.3)
MCHC RBC AUTO-ENTMCNC: 29.7 G/DL (ref 31.4–37.4)
MCV RBC AUTO: 79 FL (ref 82–98)
MONOCYTES # BLD AUTO: 1.17 THOUSAND/ΜL (ref 0.17–1.22)
MONOCYTES NFR BLD AUTO: 12 % (ref 4–12)
NEUTROPHILS # BLD AUTO: 7.79 THOUSANDS/ΜL (ref 1.85–7.62)
NEUTS SEG NFR BLD AUTO: 77 % (ref 43–75)
NRBC BLD AUTO-RTO: 0 /100 WBCS
PLATELET # BLD AUTO: 151 THOUSANDS/UL (ref 149–390)
PMV BLD AUTO: 11.3 FL (ref 8.9–12.7)
POTASSIUM SERPL-SCNC: 3.2 MMOL/L (ref 3.5–5.3)
RBC # BLD AUTO: 5.42 MILLION/UL (ref 3.88–5.62)
SODIUM SERPL-SCNC: 133 MMOL/L (ref 136–145)
WBC # BLD AUTO: 10.06 THOUSAND/UL (ref 4.31–10.16)

## 2022-05-15 PROCEDURE — 80048 BASIC METABOLIC PNL TOTAL CA: CPT | Performed by: INTERNAL MEDICINE

## 2022-05-15 PROCEDURE — 82948 REAGENT STRIP/BLOOD GLUCOSE: CPT

## 2022-05-15 PROCEDURE — 99232 SBSQ HOSP IP/OBS MODERATE 35: CPT | Performed by: INTERNAL MEDICINE

## 2022-05-15 PROCEDURE — 83735 ASSAY OF MAGNESIUM: CPT | Performed by: STUDENT IN AN ORGANIZED HEALTH CARE EDUCATION/TRAINING PROGRAM

## 2022-05-15 PROCEDURE — 85025 COMPLETE CBC W/AUTO DIFF WBC: CPT | Performed by: INTERNAL MEDICINE

## 2022-05-15 RX ORDER — POTASSIUM CHLORIDE 20 MEQ/1
40 TABLET, EXTENDED RELEASE ORAL
Status: COMPLETED | OUTPATIENT
Start: 2022-05-15 | End: 2022-05-15

## 2022-05-15 RX ADMIN — METOPROLOL TARTRATE 100 MG: 50 TABLET, FILM COATED ORAL at 17:19

## 2022-05-15 RX ADMIN — POTASSIUM CHLORIDE 40 MEQ: 1500 TABLET, EXTENDED RELEASE ORAL at 11:55

## 2022-05-15 RX ADMIN — POTASSIUM CHLORIDE 40 MEQ: 1500 TABLET, EXTENDED RELEASE ORAL at 09:21

## 2022-05-15 RX ADMIN — APIXABAN 5 MG: 5 TABLET, FILM COATED ORAL at 09:21

## 2022-05-15 RX ADMIN — FUROSEMIDE 80 MG: 10 INJECTION, SOLUTION INTRAMUSCULAR; INTRAVENOUS at 21:51

## 2022-05-15 RX ADMIN — PRAVASTATIN SODIUM 80 MG: 80 TABLET ORAL at 17:19

## 2022-05-15 RX ADMIN — ALBUTEROL SULFATE 2 PUFF: 90 AEROSOL, METERED RESPIRATORY (INHALATION) at 06:09

## 2022-05-15 RX ADMIN — METOPROLOL TARTRATE 100 MG: 50 TABLET, FILM COATED ORAL at 09:21

## 2022-05-15 RX ADMIN — APIXABAN 5 MG: 5 TABLET, FILM COATED ORAL at 17:19

## 2022-05-15 RX ADMIN — DEXTROSE 2000 MG: 50 INJECTION, SOLUTION INTRAVENOUS at 11:56

## 2022-05-15 RX ADMIN — FUROSEMIDE 80 MG: 10 INJECTION, SOLUTION INTRAMUSCULAR; INTRAVENOUS at 09:21

## 2022-05-15 RX ADMIN — METOPROLOL TARTRATE 100 MG: 50 TABLET, FILM COATED ORAL at 21:51

## 2022-05-15 NOTE — PLAN OF CARE
Problem: Potential for Falls  Goal: Patient will remain free of falls  Description: INTERVENTIONS:  - Educate patient/family on patient safety including physical limitations  - Instruct patient to call for assistance with activity   - Consult OT/PT to assist with strengthening/mobility   - Keep Call bell within reach  - Keep bed low and locked with side rails adjusted as appropriate  - Keep care items and personal belongings within reach  - Initiate and maintain comfort rounds  - Make Fall Risk Sign visible to staff  - Apply yellow socks and bracelet for high fall risk patients  - Consider moving patient to room near nurses station  Outcome: Progressing     Problem: PAIN - ADULT  Goal: Verbalizes/displays adequate comfort level or baseline comfort level  Description: Interventions:  - Encourage patient to monitor pain and request assistance  - Assess pain using appropriate pain scale  - Administer analgesics based on type and severity of pain and evaluate response  - Implement non-pharmacological measures as appropriate and evaluate response  - Consider cultural and social influences on pain and pain management  - Notify physician/advanced practitioner if interventions unsuccessful or patient reports new pain  Outcome: Progressing     Problem: INFECTION - ADULT  Goal: Absence or prevention of progression during hospitalization  Description: INTERVENTIONS:  - Assess and monitor for signs and symptoms of infection  - Monitor lab/diagnostic results  - Monitor all insertion sites, i e  indwelling lines, tubes, and drains  - Monitor endotracheal if appropriate and nasal secretions for changes in amount and color  - [place] appropriate cooling/warming therapies per order  - Administer medications as ordered  - Instruct and encourage patient and family to use good hand hygiene technique  - Identify and instruct in appropriate isolation precautions for identified infection/condition  Outcome: Progressing  Goal: Absence of fever/infection during neutropenic period  Description: INTERVENTIONS:  - Monitor WBC    Outcome: Progressing     Problem: SAFETY ADULT  Goal: Patient will remain free of falls  Description: INTERVENTIONS:  - Educate patient/family on patient safety including physical limitations  - Instruct patient to call for assistance with activity   - Consult OT/PT to assist with strengthening/mobility   - Keep Call bell within reach  - Keep bed low and locked with side rails adjusted as appropriate  - Keep care items and personal belongings within reach  - Initiate and maintain comfort rounds  - Make Fall Risk Sign visible to staff  - Apply yellow socks and bracelet for high fall risk patients  - Consider moving patient to room near nurses station  Outcome: Progressing  Goal: Maintain or return to baseline ADL function  Description: INTERVENTIONS:  -  Assess patient's ability to carry out ADLs; assess patient's baseline for ADL function and identify physical deficits which impact ability to perform ADLs (bathing, care of mouth/teeth, toileting, grooming, dressing, etc )  - Assess/evaluate cause of self-care deficits   - Assess range of motion  - Assess patient's mobility; develop plan if impaired  - Assess patient's need for assistive devices and provide as appropriate  - Encourage maximum independence but intervene and supervise when necessary  - Involve family in performance of ADLs  - Assess for home care needs following discharge   - Consider OT consult to assist with ADL evaluation and planning for discharge  - Provide patient education as appropriate  Outcome: Progressing  Goal: Maintains/Returns to pre admission functional level  Description: INTERVENTIONS:  - Perform BMAT or MOVE assessment daily    - Set and communicate daily mobility goal to care team and patient/family/caregiver  - Collaborate with rehabilitation services on mobility goals if consulted  - Perform Range of Motion 2 times a day    - Reposition patient every 2 hours  - Dangle patient 2 times a day  - Stand patient 2 times a day  - Ambulate patient 2 times a day  - Out of bed to chair 2 times a day   - Out of bed for meals 2 times a day  - Out of bed for toileting  - Record patient progress and toleration of activity level   Outcome: Progressing     Problem: DISCHARGE PLANNING  Goal: Discharge to home or other facility with appropriate resources  Description: INTERVENTIONS:  - Identify barriers to discharge w/patient and caregiver  - Arrange for needed discharge resources and transportation as appropriate  - Identify discharge learning needs (meds, wound care, etc )  - Arrange for interpretive services to assist at discharge as needed  - Refer to Case Management Department for coordinating discharge planning if the patient needs post-hospital services based on physician/advanced practitioner order or complex needs related to functional status, cognitive ability, or social support system  Outcome: Progressing     Problem: Knowledge Deficit  Goal: Patient/family/caregiver demonstrates understanding of disease process, treatment plan, medications, and discharge instructions  Description: Complete learning assessment and assess knowledge base    Interventions:  - Provide teaching at level of understanding  - Provide teaching via preferred learning methods  Outcome: Progressing     Problem: CARDIOVASCULAR - ADULT  Goal: Maintains optimal cardiac output and hemodynamic stability  Description: INTERVENTIONS:  - Monitor I/O, vital signs and rhythm  - Monitor for S/S and trends of decreased cardiac output  - Administer and titrate ordered vasoactive medications to optimize hemodynamic stability  - Assess quality of pulses, skin color and temperature  - Assess for signs of decreased coronary artery perfusion  - Instruct patient to report change in severity of symptoms  Outcome: Progressing  Goal: Absence of cardiac dysrhythmias or at baseline rhythm  Description: INTERVENTIONS:  - Continuous cardiac monitoring, vital signs, obtain 12 lead EKG if ordered  - Administer antiarrhythmic and heart rate control medications as ordered  - Monitor electrolytes and administer replacement therapy as ordered  Outcome: Progressing     Problem: SKIN/TISSUE INTEGRITY - ADULT  Goal: Skin Integrity remains intact(Skin Breakdown Prevention)  Description: Assess:  -Perform Rico assessment   -Clean and moisturize skin   -Inspect skin when repositioning, toileting, and assisting with ADLS  -Assess under medical devices   -Assess extremities for adequate circulation and sensation     Bed Management:  -Have minimal linens on bed & keep smooth, unwrinkled  -Change linens as needed when moist or perspiring    Toileting:  -Offer bedside commode  -Assess for incontinence   -Use incontinent care products after each incontinent episode     Activity:  -Encourage activity and walks on unit  -Encourage or provide ROM exercises     Skin Care:  -Avoid use of baby powder, tape, friction and shearing, hot water or constrictive clothing  -Do not massage red bony areas    Next Steps:  -Teach patient strategies to minimize risks  -Consider consults to  interdisciplinary teams  Outcome: Progressing  Goal: Incision(s), wounds(s) or drain site(s) healing without S/S of infection  Description: INTERVENTIONS  - Assess and document dressing, incision, wound bed, drain sites and surrounding tissue  - Provide patient and family education  - Perform skin care/dressing changes   Outcome: Progressing  Goal: Pressure injury heals and does not worsen  Description: Interventions:  - Implement low air loss mattress or specialty surface (Criteria met)  - Apply silicone foam dressing  - Limit chair time to 2 hour intervals   - Apply fecal or urinary incontinence containment device   - Perform passive or active ROM   - Turn and reposition patient & offload bony prominences  - Utilize friction reducing device or surface for transfers   - Consider consults to  interdisciplinary teams   - Use incontinent care products after each incontinent episode   - Consider nutrition services referral as needed  Outcome: Progressing

## 2022-05-15 NOTE — PROGRESS NOTES
NEPHROLOGY PROGRESS NOTE   Sandrine 56 y o  male MRN: [id number]  Unit/Bed#: [facility] 508-01 Encounter: [id number]  Reason for Consult: GLO    ASSESSMENT AND PLAN:  Patient is 15-year-old male with significant medical issues of hypertension, diabetes, AFib, documented cognitive impairment, presented with palpitation and found to have AFib with RVR   We are consulted for GLO management          GLO, baseline creatinine around 1 1    -creatinine 1 2 on admission significantly worsened up to peak level 3 2 now slightly improved 3 1 today  -GLO suspected secondary to AFib with RVR causing hemodynamic insult, component of volume overload   -bladder scan nonsignificant   -Lasix as below   -UA shows 2+ proteinuria, 1 to 2 RBCs, 2 to 4 WBCs, multiple hyaline cast     -urine sodium 19   -good urine output with Lasix challenge over last 24 hours  Bladder scan nonsignificant    -renal ultrasound shows normal size kidneys, normal echogenicity or hydronephrosis    -avoid any nephrotoxins or NSAIDs    -no emergent indication for dialysis yet    -status post initial vancomycin due to concern for cellulitis as per primary team   Now remains off antibiotic   No peripheral eosinophilia  Urine eosinophil 0%     Hypokalemia, serum potassium 3 2 below goal, magnesium stable   -likely secondary to aggressive diuresis  -agree with total 80 mEq potassium chloride replacement today        Hypoxic respiratory failure, O2 requirement slowly improving  Remains on O2 via nasal cannula  -echo shows EF 40%, normal IVC, technically difficult study    -clinically seems volume overloaded but overall improving   -chest x-ray concerning for mild pulmonary congestion   -continue Lasix 80 mg IV b i d  for today, reassess tomorrow for any adjustment  -daily weight, accurate intake and output    -upc ratio 750 mg  serum albumin 3 2       Leg edema, venous Doppler negative for any DVT      -Lasix as above         AFib with RVR, management as per Cardiology         Hyponatremia    -serum sodium  improving 133 with diuresis  -strict fluid restriction 1 2 L per day recommended     -Lasix as above   -serum osmolality 295, urine osmolality 297, urine sodium as above        Discussed above plan in detail with primary team       SUBJECTIVE:  Patient seen and examined at bedside  Denies chest pain, nausea, vomiting, abdominal pain    OBJECTIVE:  Current Weight: Weight - Scale: 117 kg (257 lb 0 9 oz)  Vitals:    05/15/22 1103   BP: 130/91   Pulse: (!) 107   Resp:    Temp: 97 7 °F (36 5 °C)   SpO2: (!) 87%       Intake/Output Summary (Last 24 hours) at 5/15/2022 1138  Last data filed at 5/15/2022 1103  Gross per 24 hour   Intake 554 67 ml   Output 7265 ml   Net -6710 33 ml     Wt Readings from Last 3 Encounters:   05/15/22 117 kg (257 lb 0 9 oz)   02/25/22 116 kg (255 lb)   02/26/15 110 kg (242 lb 4 oz)     Temp Readings from Last 3 Encounters:   05/15/22 97 7 °F (36 5 °C)   02/24/22 97 8 °F (36 6 °C) (Oral)   02/26/15 (!) 95 9 °F (35 5 °C)     BP Readings from Last 3 Encounters:   05/15/22 130/91   02/25/22 127/76   02/26/15 130/84     Pulse Readings from Last 3 Encounters:   05/15/22 (!) 107   02/25/22 102   02/26/15 88        Physical Examination:  General:  Lying in bed, no acute distress   Eyes:  Mild conjunctival pallor present  ENT:  External examination of ears and nose unremarkable  Neck:  No obvious lymphadenopathy appreciated  Respiratory:  Bilateral air entry present  CVS:  S1, S2 present  GI:  Soft, nondistended  CNS:  Active alert oriented x3  Skin:  No new rash  Musculoskeletal:  No obvious new gross deformity noted    Medications:    Current Facility-Administered Medications:     acetaminophen (TYLENOL) tablet 650 mg, 650 mg, Oral, Q6H PRN, Jose Khan MD, 650 mg at 05/14/22 1812    albuterol (PROVENTIL HFA,VENTOLIN HFA) inhaler 2 puff, 2 puff, Inhalation, Q4H PRN, Jose Khan MD, 2 puff at 05/15/22 0609    apixaban (ELIQUIS) tablet 5 mg, 5 mg, Oral, BID, Shelby Landry MD, 5 mg at 05/15/22 0921    cefTRIAXone (ROCEPHIN) 2,000 mg in dextrose 5 % 50 mL IVPB, 2,000 mg, Intravenous, Q24H, Shelby Landry MD, Stopped at 05/14/22 1400    furosemide (LASIX) injection 80 mg, 80 mg, Intravenous, BID, Gary Ohara MD, 80 mg at 05/15/22 0921    insulin lispro (HumaLOG) 100 units/mL subcutaneous injection 1-6 Units, 1-6 Units, Subcutaneous, TID AC, 4 Units at 05/14/22 0908 **AND** Fingerstick Glucose (POCT), , , TID AC, Ei Chucky Rodriges MD    insulin lispro (HumaLOG) 100 units/mL subcutaneous injection 1-6 Units, 1-6 Units, Subcutaneous, HS, Ei Rhys Luu MD    metoprolol (LOPRESSOR) injection 5 mg, 5 mg, Intravenous, Q6H PRN, Shelby Landry MD    metoprolol tartrate (LOPRESSOR) tablet 100 mg, 100 mg, Oral, TID, Shelby Landry MD, 100 mg at 05/15/22 0921    potassium chloride (K-DUR,KLOR-CON) CR tablet 40 mEq, 40 mEq, Oral, Q3H, Joey Zuleta DO, 40 mEq at 05/15/22 4849    pravastatin (PRAVACHOL) tablet 80 mg, 80 mg, Oral, Daily With Lizeth Turner MD, 80 mg at 05/14/22 1812    Laboratory Results:  Results from last 7 days   Lab Units 05/15/22  0605 05/14/22  0242 05/13/22  0509 05/12/22  0449 05/11/22  0000 05/10/22  1323   WBC Thousand/uL 10 06 12 14* 13 86*  --   --  8 40   HEMOGLOBIN g/dL 12 7 12 8 12 7  --   --  13 4   HEMATOCRIT % 42 7 44 1 44 5  --   --  45 9   PLATELETS Thousands/uL 151 210 205  --   --  220   SODIUM mmol/L 133* 127* 131* 131* 134* 136   POTASSIUM mmol/L 3 2* 5 7* 4 6 4 6 3 9 3 7   CHLORIDE mmol/L 95* 97* 99* 102 104 103   CO2 mmol/L 28 27 25 22 20* 25   BUN mg/dL 45* 43* 35* 28* 19 17   CREATININE mg/dL 3 11* 3 22* 3 02* 2 35* 1 23 1 20   CALCIUM mg/dL 9 2 8 6 8 8 8 7 8 7 9 3   MAGNESIUM mg/dL 2 0 2 2  --   --  2 1  --    PHOSPHORUS mg/dL  --  4 9*  --   --   --   --        XR chest pa & lateral   Final Result by Court Osborne MD (05/13 1393)   Mild interstitial pulmonary edema and basilar loculated effusions        Workstation performed: [id number]         US kidney and bladder   Final Result by Donis Glasgow MD (05/13 1434)      Normal              Workstation performed: [id number]         VAS lower limb venous duplex study, complete bilateral   Final Result by Lynnette Carty MD (05/10 1842)      XR chest 1 view portable   Final Result by Paul Aden MD (05/10 1526)      Borderline cardiomegaly  Mild vascular congestion  Bilateral pleural effusions  Workstation performed: [id number]             Portions of the record may have been created with voice recognition software  Occasional wrong word or "sound a like" substitutions may have occurred due to the inherent limitations of voice recognition software  Read the chart carefully and recognize, using context, where substitutions have occurred

## 2022-05-16 PROBLEM — E87.1 HYPONATREMIA: Status: RESOLVED | Noted: 2022-05-12 | Resolved: 2022-05-16

## 2022-05-16 PROBLEM — E87.6 HYPOKALEMIA: Status: ACTIVE | Noted: 2022-05-14

## 2022-05-16 LAB
ANION GAP SERPL CALCULATED.3IONS-SCNC: 5 MMOL/L (ref 4–13)
BASOPHILS # BLD AUTO: 0.06 THOUSANDS/ΜL (ref 0–0.1)
BASOPHILS NFR BLD AUTO: 1 % (ref 0–1)
BUN SERPL-MCNC: 43 MG/DL (ref 5–25)
CALCIUM SERPL-MCNC: 9.6 MG/DL (ref 8.3–10.1)
CHLORIDE SERPL-SCNC: 95 MMOL/L (ref 100–108)
CO2 SERPL-SCNC: 37 MMOL/L (ref 21–32)
CREAT SERPL-MCNC: 2.5 MG/DL (ref 0.6–1.3)
EOSINOPHIL # BLD AUTO: 0.38 THOUSAND/ΜL (ref 0–0.61)
EOSINOPHIL NFR BLD AUTO: 4 % (ref 0–6)
ERYTHROCYTE [DISTWIDTH] IN BLOOD BY AUTOMATED COUNT: 17.7 % (ref 11.6–15.1)
GFR SERPL CREATININE-BSD FRML MDRD: 29 ML/MIN/1.73SQ M
GLUCOSE SERPL-MCNC: 104 MG/DL (ref 65–140)
GLUCOSE SERPL-MCNC: 111 MG/DL (ref 65–140)
GLUCOSE SERPL-MCNC: 123 MG/DL (ref 65–140)
GLUCOSE SERPL-MCNC: 128 MG/DL (ref 65–140)
GLUCOSE SERPL-MCNC: 135 MG/DL (ref 65–140)
HCT VFR BLD AUTO: 44.4 % (ref 36.5–49.3)
HGB BLD-MCNC: 13.4 G/DL (ref 12–17)
IMM GRANULOCYTES # BLD AUTO: 0.04 THOUSAND/UL (ref 0–0.2)
IMM GRANULOCYTES NFR BLD AUTO: 0 % (ref 0–2)
LYMPHOCYTES # BLD AUTO: 0.84 THOUSANDS/ΜL (ref 0.6–4.47)
LYMPHOCYTES NFR BLD AUTO: 9 % (ref 14–44)
MAGNESIUM SERPL-MCNC: 1.8 MG/DL (ref 1.6–2.6)
MCH RBC QN AUTO: 23.4 PG (ref 26.8–34.3)
MCHC RBC AUTO-ENTMCNC: 30.2 G/DL (ref 31.4–37.4)
MCV RBC AUTO: 78 FL (ref 82–98)
MONOCYTES # BLD AUTO: 1.25 THOUSAND/ΜL (ref 0.17–1.22)
MONOCYTES NFR BLD AUTO: 13 % (ref 4–12)
NEUTROPHILS # BLD AUTO: 6.78 THOUSANDS/ΜL (ref 1.85–7.62)
NEUTS SEG NFR BLD AUTO: 73 % (ref 43–75)
NRBC BLD AUTO-RTO: 0 /100 WBCS
PLATELET # BLD AUTO: 143 THOUSANDS/UL (ref 149–390)
PMV BLD AUTO: 11.4 FL (ref 8.9–12.7)
POTASSIUM SERPL-SCNC: 3.1 MMOL/L (ref 3.5–5.3)
PROCALCITONIN SERPL-MCNC: 0.41 NG/ML
RBC # BLD AUTO: 5.72 MILLION/UL (ref 3.88–5.62)
SODIUM SERPL-SCNC: 137 MMOL/L (ref 136–145)
WBC # BLD AUTO: 9.35 THOUSAND/UL (ref 4.31–10.16)

## 2022-05-16 PROCEDURE — 80048 BASIC METABOLIC PNL TOTAL CA: CPT | Performed by: STUDENT IN AN ORGANIZED HEALTH CARE EDUCATION/TRAINING PROGRAM

## 2022-05-16 PROCEDURE — 83735 ASSAY OF MAGNESIUM: CPT | Performed by: STUDENT IN AN ORGANIZED HEALTH CARE EDUCATION/TRAINING PROGRAM

## 2022-05-16 PROCEDURE — 97116 GAIT TRAINING THERAPY: CPT

## 2022-05-16 PROCEDURE — 99232 SBSQ HOSP IP/OBS MODERATE 35: CPT | Performed by: INTERNAL MEDICINE

## 2022-05-16 PROCEDURE — 82948 REAGENT STRIP/BLOOD GLUCOSE: CPT

## 2022-05-16 PROCEDURE — 85025 COMPLETE CBC W/AUTO DIFF WBC: CPT | Performed by: STUDENT IN AN ORGANIZED HEALTH CARE EDUCATION/TRAINING PROGRAM

## 2022-05-16 PROCEDURE — 84145 PROCALCITONIN (PCT): CPT | Performed by: INTERNAL MEDICINE

## 2022-05-16 PROCEDURE — 97530 THERAPEUTIC ACTIVITIES: CPT

## 2022-05-16 RX ORDER — POTASSIUM CHLORIDE 20 MEQ/1
40 TABLET, EXTENDED RELEASE ORAL
Status: ACTIVE | OUTPATIENT
Start: 2022-05-16 | End: 2022-05-16

## 2022-05-16 RX ORDER — MAGNESIUM SULFATE HEPTAHYDRATE 40 MG/ML
2 INJECTION, SOLUTION INTRAVENOUS ONCE
Status: COMPLETED | OUTPATIENT
Start: 2022-05-16 | End: 2022-05-16

## 2022-05-16 RX ORDER — CALCIUM CARBONATE 200(500)MG
500 TABLET,CHEWABLE ORAL DAILY PRN
Status: DISCONTINUED | OUTPATIENT
Start: 2022-05-16 | End: 2022-05-18 | Stop reason: HOSPADM

## 2022-05-16 RX ORDER — TORSEMIDE 20 MG/1
40 TABLET ORAL DAILY
Status: DISCONTINUED | OUTPATIENT
Start: 2022-05-17 | End: 2022-05-18

## 2022-05-16 RX ORDER — POTASSIUM CHLORIDE 20 MEQ/1
40 TABLET, EXTENDED RELEASE ORAL DAILY
Status: DISCONTINUED | OUTPATIENT
Start: 2022-05-16 | End: 2022-05-18 | Stop reason: HOSPADM

## 2022-05-16 RX ADMIN — MAGNESIUM SULFATE HEPTAHYDRATE 2 G: 40 INJECTION, SOLUTION INTRAVENOUS at 09:17

## 2022-05-16 RX ADMIN — METOPROLOL TARTRATE 100 MG: 50 TABLET, FILM COATED ORAL at 09:16

## 2022-05-16 RX ADMIN — METOPROLOL TARTRATE 100 MG: 50 TABLET, FILM COATED ORAL at 20:24

## 2022-05-16 RX ADMIN — POTASSIUM CHLORIDE 40 MEQ: 1500 TABLET, EXTENDED RELEASE ORAL at 09:17

## 2022-05-16 RX ADMIN — CALCIUM CARBONATE (ANTACID) CHEW TAB 500 MG 500 MG: 500 CHEW TAB at 10:17

## 2022-05-16 RX ADMIN — APIXABAN 5 MG: 5 TABLET, FILM COATED ORAL at 17:52

## 2022-05-16 RX ADMIN — APIXABAN 5 MG: 5 TABLET, FILM COATED ORAL at 09:16

## 2022-05-16 RX ADMIN — PRAVASTATIN SODIUM 80 MG: 80 TABLET ORAL at 17:52

## 2022-05-16 RX ADMIN — FUROSEMIDE 80 MG: 10 INJECTION, SOLUTION INTRAMUSCULAR; INTRAVENOUS at 09:16

## 2022-05-16 RX ADMIN — DEXTROSE 2000 MG: 50 INJECTION, SOLUTION INTRAVENOUS at 11:55

## 2022-05-16 RX ADMIN — METOPROLOL TARTRATE 100 MG: 50 TABLET, FILM COATED ORAL at 17:52

## 2022-05-16 RX ADMIN — ACETAMINOPHEN 650 MG: 325 TABLET, FILM COATED ORAL at 05:38

## 2022-05-16 RX ADMIN — POTASSIUM CHLORIDE 40 MEQ: 1500 TABLET, EXTENDED RELEASE ORAL at 09:16

## 2022-05-16 NOTE — PLAN OF CARE
Problem: Potential for Falls  Goal: Patient will remain free of falls  Description: INTERVENTIONS:  - Educate patient/family on patient safety including physical limitations  - Instruct patient to call for assistance with activity   - Consult OT/PT to assist with strengthening/mobility   - Keep Call bell within reach  - Keep bed low and locked with side rails adjusted as appropriate  - Keep care items and personal belongings within reach  - Initiate and maintain comfort rounds  - Make Fall Risk Sign visible to staff  - Apply yellow socks and bracelet for high fall risk patients  - Consider moving patient to room near nurses station  Outcome: Progressing     Problem: PAIN - ADULT  Goal: Verbalizes/displays adequate comfort level or baseline comfort level  Description: Interventions:  - Encourage patient to monitor pain and request assistance  - Assess pain using appropriate pain scale  - Administer analgesics based on type and severity of pain and evaluate response  - Implement non-pharmacological measures as appropriate and evaluate response  - Consider cultural and social influences on pain and pain management  - Notify physician/advanced practitioner if interventions unsuccessful or patient reports new pain  Outcome: Progressing     Problem: INFECTION - ADULT  Goal: Absence or prevention of progression during hospitalization  Description: INTERVENTIONS:  - Assess and monitor for signs and symptoms of infection  - Monitor lab/diagnostic results  - Monitor all insertion sites, i e  indwelling lines, tubes, and drains  - Monitor endotracheal if appropriate and nasal secretions for changes in amount and color  - [place] appropriate cooling/warming therapies per order  - Administer medications as ordered  - Instruct and encourage patient and family to use good hand hygiene technique  - Identify and instruct in appropriate isolation precautions for identified infection/condition  Outcome: Progressing  Goal: Absence of fever/infection during neutropenic period  Description: INTERVENTIONS:  - Monitor WBC    Outcome: Progressing     Problem: SAFETY ADULT  Goal: Patient will remain free of falls  Description: INTERVENTIONS:  - Educate patient/family on patient safety including physical limitations  - Instruct patient to call for assistance with activity   - Consult OT/PT to assist with strengthening/mobility   - Keep Call bell within reach  - Keep bed low and locked with side rails adjusted as appropriate  - Keep care items and personal belongings within reach  - Initiate and maintain comfort rounds  - Make Fall Risk Sign visible to staff  - Apply yellow socks and bracelet for high fall risk patients  - Consider moving patient to room near nurses station  Outcome: Progressing  Goal: Maintain or return to baseline ADL function  Description: INTERVENTIONS:  -  Assess patient's ability to carry out ADLs; assess patient's baseline for ADL function and identify physical deficits which impact ability to perform ADLs (bathing, care of mouth/teeth, toileting, grooming, dressing, etc )  - Assess/evaluate cause of self-care deficits   - Assess range of motion  - Assess patient's mobility; develop plan if impaired  - Assess patient's need for assistive devices and provide as appropriate  - Encourage maximum independence but intervene and supervise when necessary  - Involve family in performance of ADLs  - Assess for home care needs following discharge   - Consider OT consult to assist with ADL evaluation and planning for discharge  - Provide patient education as appropriate  Outcome: Progressing  Goal: Maintains/Returns to pre admission functional level  Description: INTERVENTIONS:  - Perform BMAT or MOVE assessment daily    - Set and communicate daily mobility goal to care team and patient/family/caregiver  - Collaborate with rehabilitation services on mobility goals if consulted  - Perform Range of Motion 2 times a day    - Reposition patient every 2 hours  - Dangle patient 2 times a day  - Stand patient 2 times a day  - Ambulate patient 2 times a day  - Out of bed to chair 2 times a day   - Out of bed for meals 2 times a day  - Out of bed for toileting  - Record patient progress and toleration of activity level   Outcome: Progressing     Problem: DISCHARGE PLANNING  Goal: Discharge to home or other facility with appropriate resources  Description: INTERVENTIONS:  - Identify barriers to discharge w/patient and caregiver  - Arrange for needed discharge resources and transportation as appropriate  - Identify discharge learning needs (meds, wound care, etc )  - Arrange for interpretive services to assist at discharge as needed  - Refer to Case Management Department for coordinating discharge planning if the patient needs post-hospital services based on physician/advanced practitioner order or complex needs related to functional status, cognitive ability, or social support system  Outcome: Progressing     Problem: Knowledge Deficit  Goal: Patient/family/caregiver demonstrates understanding of disease process, treatment plan, medications, and discharge instructions  Description: Complete learning assessment and assess knowledge base    Interventions:  - Provide teaching at level of understanding  - Provide teaching via preferred learning methods  Outcome: Progressing     Problem: SKIN/TISSUE INTEGRITY - ADULT  Goal: Skin Integrity remains intact(Skin Breakdown Prevention)  Description: Assess:  -Perform Rico assessment   -Clean and moisturize skin   -Inspect skin when repositioning, toileting, and assisting with ADLS  -Assess under medical devices   -Assess extremities for adequate circulation and sensation     Bed Management:  -Have minimal linens on bed & keep smooth, unwrinkled  -Change linens as needed when moist or perspiring    Toileting:  -Offer bedside commode  -Assess for incontinence  -Use incontinent care products after each incontinent episode    Skin Care:  -Avoid use of baby powder, tape, friction and shearing, hot water or constrictive clothing  -Relieve pressure over bony prominences  -Do not massage red bony areas    Next Steps:  -Teach patient strategies to minimize risks   -Consider consults to  interdisciplinary teams  Outcome: Progressing  Goal: Incision(s), wounds(s) or drain site(s) healing without S/S of infection  Description: INTERVENTIONS  - Assess and document dressing, incision, wound bed, drain sites and surrounding tissue  - Provide patient and family education  - Perform skin care/dressing changes   Outcome: Progressing  Goal: Pressure injury heals and does not worsen  Description: Interventions:  - Implement low air loss mattress or specialty surface (Criteria met)  - Apply silicone foam dressing  - Instruct/assist with weight shifting in chair   - Use special pressure reducing interventions  - Apply fecal or urinary incontinence containment device   - Perform passive or active ROM   - Turn and reposition patient & offload bony prominences   - Utilize friction reducing device or surface for transfers   - Consider consults to  interdisciplinary teams   - Use incontinent care products after each incontinent episode  - Consider nutrition services referral as needed  Outcome: Progressing     Problem: CARDIOVASCULAR - ADULT  Goal: Maintains optimal cardiac output and hemodynamic stability  Description: INTERVENTIONS:  - Monitor I/O, vital signs and rhythm  - Monitor for S/S and trends of decreased cardiac output  - Administer and titrate ordered vasoactive medications to optimize hemodynamic stability  - Assess quality of pulses, skin color and temperature  - Assess for signs of decreased coronary artery perfusion  - Instruct patient to report change in severity of symptoms  5/16/2022 0328 by Pablo Blake RN  Outcome: Not Progressing  5/16/2022 0328 by Pablo Blake RN  Outcome: Progressing  Goal: Absence of cardiac dysrhythmias or at baseline rhythm  Description: INTERVENTIONS:  - Continuous cardiac monitoring, vital signs, obtain 12 lead EKG if ordered  - Administer antiarrhythmic and heart rate control medications as ordered  - Monitor electrolytes and administer replacement therapy as ordered  5/16/2022 0328 by Suad Mcrae RN  Outcome: Not Progressing  5/16/2022 0328 by Suad Mcrae RN  Outcome: Progressing

## 2022-05-16 NOTE — PLAN OF CARE
Problem: PHYSICAL THERAPY ADULT  Goal: Performs mobility at highest level of function for planned discharge setting  See evaluation for individualized goals  Description:            See flowsheet documentation for full assessment, interventions and recommendations  Outcome: Adequate for Discharge  Note: Prognosis: Good  Problem List: Decreased endurance, Impaired balance, Decreased mobility, Impaired judgement, Decreased cognition, Decreased safety awareness, Obesity, Decreased skin integrity  Assessment: Pt seen for PT treatment session with focus on bed mobility, functional transfers, functional mobility  Pt making good progress toward goals this session  Pt able to ambulate household+ distances without hands on assist and no LOB at this time  Pt demonstrates adequate force production for STS and bed mobility without assist   Pt left supine in bed with all needs in reach  Pt denies any concerns regarding mobility upon DC  PT to DC pt as pt has no further acute skilled PT needs and recommending HHPT  The patient's AM-PAC Basic Mobility Inpatient Short Form Raw Score is 20  A Raw score of greater than 16 suggests the patient may benefit from discharge to home  Please also refer to the recommendation of the Physical Therapist for safe discharge planning  Barriers to Discharge: None        PT Discharge Recommendation: Home with home health rehabilitation          See flowsheet documentation for full assessment

## 2022-05-16 NOTE — PLAN OF CARE
Problem: Potential for Falls  Goal: Patient will remain free of falls  Description: INTERVENTIONS:  - Educate patient/family on patient safety including physical limitations  - Instruct patient to call for assistance with activity   - Consult OT/PT to assist with strengthening/mobility   - Keep Call bell within reach  - Keep bed low and locked with side rails adjusted as appropriate  - Keep care items and personal belongings within reach  - Initiate and maintain comfort rounds  - Make Fall Risk Sign visible to staff  Problem: INFECTION - ADULT  Goal: Absence or prevention of progression during hospitalization  Description: INTERVENTIONS:  - Assess and monitor for signs and symptoms of infection  - Monitor lab/diagnostic results  - Monitor all insertion sites, i e  indwelling lines, tubes, and drains  - Monitor endotracheal if appropriate and nasal secretions for changes in amount and color  - [place] appropriate cooling/warming therapies per order  - Administer medications as ordered  - Instruct and encourage patient and family to use good hand hygiene technique  - Identify and instruct in appropriate isolation precautions for identified infection/condition  Outcome: Progressing    - Apply yellow socks and bracelet for high fall risk patients  - Consider moving patient to room near nurses station  Outcome: Progressing

## 2022-05-16 NOTE — PROGRESS NOTES
=    INTERNAL MEDICINE RESIDENCY PROGRESS NOTE     Name: Sandrine   Age & Sex: 56 y o  male   MRN: [id number]  Unit/Bed#: [facility] 508-01   Encounter: [id number]  Team: SOD Team B     PATIENT INFORMATION     Name: Sandrine   Age & Sex: 56 y o  male   MRN: [id number]  Hospital Stay Days: 6    ASSESSMENT/PLAN     Principal Problem:    Atrial fibrillation with rapid ventricular response ([address] )  Active Problems:    GLO (acute kidney injury) ([facility] [address] )    Hypokalemia    Acute exacerbation of CHF (congestive heart failure) ([address] )    Asthma    Cognitive deficits    Type 2 diabetes mellitus, without long-term current use of insulin ([facility])    Hyperlipidemia    Leg swelling      * Atrial fibrillation with rapid ventricular response ([facility] [address] )  Assessment & Plan   Presented with A fib with RVR   Patient reports he is running out of Eliquis for a few weeks  He did not recall taking lopressor at home   EKG showed Afib with RVR   [id number]= 2 (HTN, DM)   ECHO on Feb showed normal EF and no significant valvular disease  Repeat Echo on this admission EF 40%  Julieann Frankel Cardiology consulted   Rate is not controlled   Increased metoprolol to 100 mg tid   Not started cardizem due to low EF   Discontinue digoxin due to hyperkalemia   IV metoprolol prn for HR >120   Cannot undergo chemical or JET/cardioversion as patient is not compliance with medications, and underlying cognitive disability  Acute exacerbation of CHF (congestive heart failure) ([facility])  Assessment & Plan  Wt Readings from Last 3 Encounters:   05/14/22 121 kg (267 lb 10 2 oz)   02/25/22 116 kg (255 lb)   02/26/15 110 kg (242 lb 4 oz)     · ECHO on Feb showed normal EF and no significant valvular disease  Repeat Echo on this admission EF 40%    · Likely tachyarrhythmia mediated  · Patient appears to be volume overloaded  · Cardiology consulted  · Nephrology started IV lasix 80 mg bid on 5/14/22  · On metoprolol tartrate for A Fib  · Not on ACEI/ARB due to GLO  · Daily weights  · Is & Os: -6L over 24 hours  Weights down to 242 from 265 lb  · Salt and fluid restriction         Hypokalemia  Assessment & Plan  · K 3 1 this am  · Hypokalemia due to diuresis  · Replaced potassium and mag  Keep K>4 and Mg >2  · Monitor BMP    GLO (acute kidney injury) ([facility] [address] )  Assessment & Plan  · Creatinine on admission 1 2  · Cr 2 35 > 3 > 3 2 > 2 5  · Given lasix due to volume overloaded state  · Avoid nephrotoxin and hypotension  · UA showed 1-2 RBC, 3-4 WBC and multiple hyaline casts  Urine eosinophil 0%  · Bladder scan: <30 cc  · Renal US normal  · ESR and CRP are elevated  · Patient appears to be volume overloaded  · He received 2 doses of IV vancomycin on admission  · Likely prerenal due to cardiorenal vs AIN due to medications (s/p vancomycin 2 doses) Unlikely post renal as no obstruction noted on US  · Nephrology is following  · Follow up with [facility]     Leg swelling  Assessment & Plan  · Chronic  L>R  VAS negative for DVT  · He was given IV vancomycin 2 doses for possible cellulitis  · Likely chronic venous stasis dermatitis  · His albumin on admission was 3 7  · TSH high 5 4, Free T 4 normal 1 08  · Lasix was given however creatinine worsen  · WBC up to 13 86 today  No fever  Some redness and petechiae noted on LLE  Warm to touch  · Monitor clinically, fever and WBC  · Continue IV ceftriaxone, tolerating well, no reaction  Procalcitonin 1 73  Hyperlipidemia  Assessment & Plan  · Continue statin     Type 2 diabetes mellitus, without long-term current use of insulin [facility])  Assessment & Plan  Lab Results   Component Value Date    HGBA1C 6 7 (H) 02/25/2022       Recent Labs     05/15/22  1606 05/15/22  2120 05/16/22  0638 05/16/22  1053   POCGLU 133 145* 111 128       Blood Sugar Average: Last 72 hrs:  (P) 143 4     · Monitor BG   · Holding home med metformin  · SSI and accucheck  · Hypoglycemic protocol    Cognitive deficits  Assessment & Plan  · Baseline   Able to communicate clearly  · Live at group home  · He is relatively independent with ADLs  He uses the bus to go to the appointment  However he could not make an office appointment on his own  Will help to set up an appointment upon discharge  Asthma  Assessment & Plan  Stable on albuterol as needed    Hyponatremia-resolved as of 2022  Assessment & Plan  · Na 131 >> 127 >>137  · Patient looks volume overloaded  · Serum osm 295  Urine osm 297  Urine Na 19  · Nephrology consult  · TSH 5 4 and free T4 1 08  Lipid panel: LDL 52, HDL 17, TG 99  · Continue to monitor Na  · Hypervolemic isotonic hyponatremia in the setting of acute CHF exacerbation  · Na improved with diuresis  · Fluid restriction 1 2 L per day      Disposition: Continue med surg with tele  Monitor GLO  SUBJECTIVE     Patient seen and examined  No acute events overnight  Patient reports he is feeling well and sleeping well  No complaints overnight  Denies fever, chills, N/V, constipation, diarrhea  Last BM yesterday night  OBJECTIVE     Vitals:    05/15/22 2353 22 0534 22 0744 22 1030   BP: 131/86  128/85 127/87   BP Location:   Right arm Right arm   Pulse: 100  105 104   Resp: 18  17 17   Temp: 98 3 °F (36 8 °C)  98 °F (36 7 °C) 97 9 °F (36 6 °C)   TempSrc:   Oral Oral   SpO2: 95%  (!) 88% 91%   Weight:  110 kg (242 lb 11 2 oz)     Height:          Temperature:   Temp (24hrs), Av °F (36 7 °C), Min:97 8 °F (36 6 °C), Max:98 3 °F (36 8 °C)    Temperature: 97 9 °F (36 6 °C)  Intake & Output:  I/O        0701  05/15 0700 05/15 0701   0700  0701   0700    P  O  358 360 300    IV Piggyback 196 7  50    Total Intake(mL/kg) 554 7 (4 7) 360 (3 3) 350 (3 2)    Urine (mL/kg/hr) 5650 (2) 6700 (2 5) 650 (1 5)    Stool 0      Total Output 5650 6700 650    Net -509 3 -5421 -300           Unmeasured Stool Occurrence 1 x          Weights:   IBW (Ideal Body Weight): 73 kg    Body mass index is 34 82 kg/m²    Weight (last 2 days)     Date/Time Weight    22 0534 110 (242 7)    05/15/22 0600 117 (257 06)    22 0256 121 (267 64)        Physical Exam  Vitals and nursing note reviewed  Constitutional:       General: He is not in acute distress  Appearance: He is well-developed  HENT:      Head: Normocephalic and atraumatic  Eyes:      Conjunctiva/sclera: Conjunctivae normal    Neck:      Vascular: JVD (12cm, to the ear) present  Cardiovascular:      Rate and Rhythm: Normal rate  Rhythm irregular  Heart sounds: No murmur heard  Pulmonary:      Effort: Pulmonary effort is normal  No respiratory distress  Breath sounds: Normal breath sounds  Abdominal:      General: Bowel sounds are normal       Palpations: Abdomen is soft  Tenderness: There is no abdominal tenderness  There is no right CVA tenderness or left CVA tenderness  Musculoskeletal:      Cervical back: Neck supple  Right lower le+ Pitting Edema present  Left lower le+ Pitting Edema present  Skin:     General: Skin is warm and dry  Neurological:      Mental Status: He is alert and oriented to person, place, and time  Psychiatric:         Mood and Affect: Mood normal          Behavior: Behavior normal        LABORATORY DATA     Labs: I have personally reviewed pertinent reports    Results from last 7 days   Lab Units 22  0532 05/15/22  0605 22  0242   WBC Thousand/uL 9 35 10 06 12 14*   HEMOGLOBIN g/dL 13 4 12 7 12 8   HEMATOCRIT % 44 4 42 7 44 1   PLATELETS Thousands/uL 143* 151 210   NEUTROS PCT % 73 77* 83*   MONOS PCT % 13* 12 10      Results from last 7 days   Lab Units 22  0532 05/15/22  0605 22  0242 22  0000 05/10/22  1323   POTASSIUM mmol/L 3 1* 3 2* 5 7*   < > 3 7   CHLORIDE mmol/L 95* 95* 97*   < > 103   CO2 mmol/L 37* 28 27   < > 25   BUN mg/dL 43* 45* 43*   < > 17   CREATININE mg/dL 2 50* 3 11* 3 22*   < > 1 20   CALCIUM mg/dL 9 6 9 2 8 6   < > 9 3   ALK PHOS U/L  -- --  90  --  94   ALT U/L  --   --  31  --  27   AST U/L  --   --  109*  --  29    < > = values in this interval not displayed  Results from last 7 days   Lab Units 05/16/22  0532 05/15/22  0605 05/14/22  0242   MAGNESIUM mg/dL 1 8 2 0 2 2     Results from last 7 days   Lab Units 05/14/22  0242   PHOSPHORUS mg/dL 4 9*      Results from last 7 days   Lab Units 05/12/22  0449 05/11/22  0236 05/10/22  2021 05/10/22  1325   INR   --   --   --  1 39*   PTT seconds 40* 70* 68* 35               IMAGING & DIAGNOSTIC TESTING     Radiology Results: I have personally reviewed pertinent reports  XR chest 1 view portable    Result Date: 5/10/2022  Impression: Borderline cardiomegaly  Mild vascular congestion  Bilateral pleural effusions  Workstation performed: [id number]     XR chest pa & lateral    Result Date: 5/13/2022  Impression: Mild interstitial pulmonary edema and basilar loculated effusions  Workstation performed: [id number]     US kidney and bladder    Result Date: 5/13/2022  Impression: Normal  Workstation performed: [id number]     Other Diagnostic Testing: I have personally reviewed pertinent reports      ACTIVE MEDICATIONS     Current Facility-Administered Medications   Medication Dose Route Frequency    acetaminophen (TYLENOL) tablet 650 mg  650 mg Oral Q6H PRN    albuterol (PROVENTIL HFA,VENTOLIN HFA) inhaler 2 puff  2 puff Inhalation Q4H PRN    apixaban (ELIQUIS) tablet 5 mg  5 mg Oral BID    calcium carbonate (TUMS) chewable tablet 500 mg  500 mg Oral Daily PRN    cefTRIAXone (ROCEPHIN) 2,000 mg in dextrose 5 % 50 mL IVPB  2,000 mg Intravenous Q24H    insulin lispro (HumaLOG) 100 units/mL subcutaneous injection 1-6 Units  1-6 Units Subcutaneous TID AC    insulin lispro (HumaLOG) 100 units/mL subcutaneous injection 1-6 Units  1-6 Units Subcutaneous HS    metoprolol (LOPRESSOR) injection 5 mg  5 mg Intravenous Q6H PRN    metoprolol tartrate (LOPRESSOR) tablet 100 mg  100 mg Oral TID    potassium chloride (K-DUR,KLOR-CON) CR tablet 40 mEq  40 mEq Oral Daily    pravastatin (PRAVACHOL) tablet 80 mg  80 mg Oral Daily With Dinner    [START ON 5/17/2022] torsemide (DEMADEX) tablet 40 mg  40 mg Oral Daily       VTE Pharmacologic Prophylaxis: Apixaban (ELIQUIS)  VTE Mechanical Prophylaxis: foot pump applied    Portions of the record may have been created with voice recognition software  Occasional wrong word or "sound a like" substitutions may have occurred due to the inherent limitations of voice recognition software    Read the chart carefully and recognize, using context, where substitutions have occurred   ==  Chucky Peraza MD  [address]  Internal Medicine Residency PGY-2

## 2022-05-16 NOTE — CASE MANAGEMENT
Case Management Discharge Planning Note    Patient name Anna Michaels  Location [address] [place] [address] [address] MRN [id number]  : 1975 Date 2022       Current Admission Date: 5/10/2022  Current Admission Diagnosis:Atrial fibrillation with rapid ventricular response [facility])   Patient Active Problem List    Diagnosis Date Noted    Hypokalemia 2022    Acute exacerbation of CHF (congestive heart failure) ([place] [address] ) 2022    GLO (acute kidney injury) ([place] [address] ) 2022    Hyperlipidemia 05/10/2022    Leg swelling 05/10/2022    Hypertension 2022    Asthma 2022    Atrial fibrillation with rapid ventricular response ([place] [address] ) 2022    Elevated liver enzymes 2022    Elevated d-dimer 2022    Seroma due to trauma ([place] [address] ) 2022    Bilateral pleural effusion 2022    Hiatal hernia 2022    Dyspnea on minimal exertion 2022    Obesity 2022    Cognitive deficits 2022    Cardiomegaly 2022    Diverticulosis of colon without diverticulitis 2022    Type 2 diabetes mellitus, without long-term current use of insulin ([place] [address] ) 2022      LOS (days): 6  Geometric Mean LOS (GMLOS) (days): 2 40  Days to GMLOS:-3 7     OBJECTIVE:  Risk of Unplanned Readmission Score: 15 83         Current admission status: Inpatient   Preferred Pharmacy:   [facility] - [address]  Phone: [phone] Fax: [phone]    [facility] [id number], [address] [facility] [address] [place], [address], [address] [facility] [address]  Phone: [phone] Fax: [address] [facility] [address] [place] [address] [address] [facility] [address] [address]  Phone: [phone] Fax: [phone]    Primary Care Provider: Nohelia Brooks MD (Inactive)    Primary Insurance: MEDICARE  Secondary Insurance: [address]    DISCHARGE DETAILS:  Other Referral/Resources/Interventions Provided:  Referral Comments: Per SOD-C- [facility]/ Patient will need assistance for transport to appointments on [address] [address] at discharge  CM to follow up

## 2022-05-16 NOTE — PHYSICAL THERAPY NOTE
PHYSICAL THERAPY NOTE          Patient Name: Federico CRAMERXOCaroY Date: 5/16/2022 05/16/22 1355   PT Last Visit   PT Visit Date 05/16/22   Note Type   Note Type Treatment   Pain Assessment   Pain Assessment Tool 0-10   Pain Score No Pain   Restrictions/Precautions   Weight Bearing Precautions Per Order No   Other Precautions Cognitive  (baseline cog impairment)   General   Chart Reviewed Yes   Response to Previous Treatment Patient with no complaints from previous session  Family/Caregiver Present No   Cognition   Overall Cognitive Status Impaired   Arousal/Participation Alert   Attention Within functional limits   Orientation Level Oriented X4   Memory Within functional limits   Following Commands Follows one step commands without difficulty   Comments Pt with baseline cog impairments   Subjective   Subjective Pt pleasant and agreeable to participate in therapy session  Bed Mobility   Supine to Sit 6  Modified independent   Additional items HOB elevated   Sit to Supine 6  Modified independent   Additional items HOB elevated   Additional Comments Supine in bed upon PT arrival   Pt left supine in bed with all needs in reach  Transfers   Sit to Stand 5  Supervision   Stand to Sit 5  Supervision   Additional Comments without AD   Ambulation/Elevation   Gait pattern Improper Weight shift  (mild unsteadiness)   Gait Assistance 5  Supervision   Assistive Device None   Distance 120 ft x 2   Balance   Static Sitting Good   Dynamic Sitting Fair +   Static Standing Fair   Dynamic Standing Fair   Ambulatory Fair   Activity Tolerance   Activity Tolerance Patient tolerated treatment well   Nurse Made Aware RN cleared pt to be seen by PT   Assessment   Prognosis Good   Assessment Pt seen for PT treatment session with focus on bed mobility, functional transfers, functional mobility  Pt making good progress toward goals this session    Pt able to ambulate household+ distances without hands on assist and no LOB at this time  Pt demonstrates adequate force production for STS and bed mobility without assist   Pt left supine in bed with all needs in reach  Pt denies any concerns regarding mobility upon DC  PT to DC pt as pt has no further acute skilled PT needs and recommending HHPT  The patient's AM-PAC Basic Mobility Inpatient Short Form Raw Score is 20  A Raw score of greater than 16 suggests the patient may benefit from discharge to home  Please also refer to the recommendation of the Physical Therapist for safe discharge planning     Barriers to Discharge None   Goals   Patient Goals to get better   Plan   Progress Discontinue PT   Recommendation   PT Discharge Recommendation Home with home health rehabilitation   Equipment Recommended   (none)   AM-PAC Basic Mobility Inpatient   Turning in Bed Without Bedrails 4   Lying on Back to Sitting on Edge of Flat Bed 4   Moving Bed to Chair 3   Standing Up From Chair 3   Walk in Room 3   Climb 3-5 Stairs 3   Basic Mobility Inpatient Raw Score 20   Basic Mobility Standardized Score 43 99   Highest Level Of Mobility   JH-HLM Goal 6: Walk 10 steps or more   JH-HLM Achieved 7: Walk 25 feet or more     Rebecca Ley, PT, DPT

## 2022-05-16 NOTE — PROGRESS NOTES
Follow up Consultation    Nephrology   Karthik Luo 56 y o  male MRN: [id number]  Unit/Bed#: [facility] 508-01 Encounter: [id number]      Physician Requesting Consult: Radha Morales MD        ASSESSMENT/PLAN:  71-year-old male with multiple comorbidities including hypertension, diabetes, AFib, documented cognitive impairment admitted with palpitations found to have AFib with RVR  Nephrology following for acute kidney injury  Acute kidney injury :  GLO multifactorial most likely secondary to cardiorenal syndrome in light of hemodynamic perturbations in light of AFib with RVR and volume overload  After review of records In [facility] as well as Care everywhere it appears that the patient has a baseline Creatinine of 1-1 1 mg/dL  patient was admitted with a creatinine of 1 2 mg/dL on 05/10/2022  Peak creatinine this hospitalization at 3 22 on 05/14/2022  patient's creatinine today is at 2 50 mg/dL  Renal ultrasound with no hydronephrosis  No indication for dialysis  check BMP in a m  Await renal recovery  Optimize hemodynamic status to avoid delay in renal recovery  Place on a renal diet when allowed diet order  Avoid nephrotoxins, adjust meds to appropriate GFR  Strict I/O  Daily weights  Urinary retention protocol if patient does not have a Sellers  will need to set up patient for follow up with Nephrology as an outpatient post hospitalization  for nephrology as an outpatient patient follows up with no nephrologist    Blood pressure/hypertension:  current medications:  Lasix 80 mg IV b i d  metoprolol 100 mg p o  T i d   recommendations:  DC IV Lasix and start torsemide 40 mg p o  Q day from tomorrow  Optimize hemodynamics  Maintain MAP > 65mmHg  Avoid BP fluctuations      H/H/anemia:  most recent hemoglobin at 13 4 grams/deciliter  maintain hemoglobin greater than 8 grams/deciliter    Acid-base electrolytes:    Electrolytes:    Hyponatremia:   Most recent sodium at  Remains stable  Continue fluid restriction 1 2 L per day    Hypokalemia:  Likely secondary to diuresis  Most recent potassium 3 1 mEq  Supplement and recheck  K-Dur 40 mEq p o  Q day    Acid-base:    Most recent bicarb at 37 likely secondary to diuresis  We will decrease diuretics    Other medical problems:  Proteinuria: Most recent protein creatinine ratio 750 mg  CHF exacerbation:  Management per primary team   Most recent echo with EF of 40%  Currently on Lasix 80 mg IV b i d  DC IV Lasix and start torsemide 40 mg p o  Q day from tomorrow      Thanks for the consult  Will continue to follow  Please call with questions/ concerns  Above-mentioned orders and Plan in terms of acute kidney injury was discussed with the team in [address] MD[facility], 12:15 PM              Objective :   Patient seen and examined in his room no overnight events blood pressure stable remains afebrile urine output 6 7 L last 24 hours  Overall reports he is feeling much better  Thus far diuresis this a m  1 2 L  Happy that his renal parameters are improving  Weight decreased by 15 lb since yesterday       PHYSICAL EXAM  /87 (BP Location: Right arm)   Pulse 104   Temp 97 9 °F (36 6 °C) (Oral)   Resp 17   Ht 5' 10" (1 778 m)   Wt 110 kg (242 lb 11 2 oz)   SpO2 91%   BMI 34 82 kg/m²   Temp (24hrs), Av °F (36 7 °C), Min:97 8 °F (36 6 °C), Max:98 3 °F (36 8 °C)        Intake/Output Summary (Last 24 hours) at 2022 1215  Last data filed at 2022 1100  Gross per 24 hour   Intake 470 ml   Output 5735 ml   Net -5265 ml       I/O last 24 hours: In: 710 [P O :660; IV Piggyback:50]  Out: 7850 [Urine:7850]      Current Weight: Weight - Scale: 110 kg (242 lb 11 2 oz)  First Weight: Weight - Scale: 124 kg (272 lb 14 9 oz)  Physical Exam  Vitals reviewed  Constitutional:       General: He is not in acute distress  Appearance: Normal appearance  He is obese  He is not ill-appearing, toxic-appearing or diaphoretic     HENT:      Head: Normocephalic and atraumatic  Mouth/Throat:      Mouth: Mucous membranes are moist       Pharynx: No oropharyngeal exudate  Eyes:      General: No scleral icterus  Conjunctiva/sclera: Conjunctivae normal    Cardiovascular:      Rate and Rhythm: Normal rate  Heart sounds: Normal heart sounds  No murmur heard  No friction rub  Pulmonary:      Effort: Pulmonary effort is normal  No respiratory distress  Breath sounds: Normal breath sounds  No stridor  No wheezing  Abdominal:      General: There is no distension  Palpations: Abdomen is soft  There is no mass  Tenderness: There is no abdominal tenderness  Musculoskeletal:         General: Swelling present  Cervical back: Normal range of motion and neck supple  No rigidity  Comments: Plus two edema bilateral extremities   Skin:     General: Skin is warm  Coloration: Skin is not jaundiced  Neurological:      General: No focal deficit present  Mental Status: He is alert and oriented to person, place, and time  Psychiatric:         Mood and Affect: Mood normal          Behavior: Behavior normal              Review of Systems   Constitutional: Negative for chills and fatigue  HENT: Negative for congestion  Respiratory: Negative for cough and shortness of breath  Cardiovascular: Positive for leg swelling  Gastrointestinal: Negative for abdominal pain, constipation, diarrhea, nausea and vomiting  Genitourinary: Negative for dysuria  Musculoskeletal: Negative for back pain  Skin: Negative for rash  Neurological: Negative for headaches  Psychiatric/Behavioral: Negative for agitation and confusion  All other systems reviewed and are negative        Scheduled Meds:  Current Facility-Administered Medications   Medication Dose Route Frequency Provider Last Rate    acetaminophen  650 mg Oral Q6H PRN Ирина Lam MD      albuterol  2 puff Inhalation Q4H PRN Ирина Lam MD      apixaban 5 mg Oral BID Ewa Hobbs MD      calcium carbonate  500 mg Oral Daily PRN Ewa Hobbs MD      cefTRIAXone  2,000 mg Intravenous Q24H Ewa Hobbs MD 2,000 mg (05/16/22 1155)    furosemide  80 mg Intravenous BID Boni Tucker MD      insulin lispro  1-6 Units Subcutaneous TID AC Chucky Russ MD      insulin lispro  1-6 Units Subcutaneous HS Ei Olivia Russ MD      metoprolol  5 mg Intravenous Q6H PRN Ewa Hobbs MD      metoprolol tartrate  100 mg Oral TID Ewa Hobbs MD      potassium chloride  40 mEq Oral Q3H Jasmit Merlyn,       potassium chloride  40 mEq Oral Daily Ewa Hobbs MD      pravastatin  80 mg Oral Daily With Iwona Ocampo MD         PRN Meds:   acetaminophen    albuterol    calcium carbonate    metoprolol    Continuous Infusions:       Invasive Devices: Invasive Devices  Report    Peripheral Intravenous Line  Duration           Peripheral IV 05/14/22 Distal;Right;Ventral (anterior) Forearm 1 day                  LABORATORY:    Results from last 7 days   Lab Units 05/16/22  0532 05/15/22  0605 05/14/22  0242 05/13/22  0509 05/12/22  0449 05/11/22  0000 05/10/22  1323   WBC Thousand/uL 9 35 10 06 12 14* 13 86*  --   --  8 40   HEMOGLOBIN g/dL 13 4 12 7 12 8 12 7  --   --  13 4   HEMATOCRIT % 44 4 42 7 44 1 44 5  --   --  45 9   PLATELETS Thousands/uL 143* 151 210 205  --   --  220   POTASSIUM mmol/L 3 1* 3 2* 5 7* 4 6 4 6 3 9 3 7   CHLORIDE mmol/L 95* 95* 97* 99* 102 104 103   CO2 mmol/L 37* 28 27 25 22 20* 25   BUN mg/dL 43* 45* 43* 35* 28* 19 17   CREATININE mg/dL 2 50* 3 11* 3 22* 3 02* 2 35* 1 23 1 20   CALCIUM mg/dL 9 6 9 2 8 6 8 8 8 7 8 7 9 3   MAGNESIUM mg/dL 1 8 2 0 2 2  --   --  2 1  --    PHOSPHORUS mg/dL  --   --  4 9*  --   --   --   --       rest all reviewed    RADIOLOGY:  XR chest pa & lateral   Final Result by Delmy Chavarria MD (05/13 1427)   Mild interstitial pulmonary edema and basilar loculated effusions        Workstation performed: [id number]         US kidney and bladder   Final Result by Vonnie Milton MD (05/13 2290)      Normal              Workstation performed: [id number]         VAS lower limb venous duplex study, complete bilateral   Final Result by Walt Anne MD (05/10 1952)      XR chest 1 view portable   Final Result by Alex Barclay MD (05/10 2636)      Borderline cardiomegaly  Mild vascular congestion  Bilateral pleural effusions  Workstation performed: [id number]           Rest all reviewed    Portions of the record may have been created with voice recognition software  Occasional wrong word or "sound a like" substitutions may have occurred due to the inherent limitations of voice recognition software  Read the chart carefully and recognize, using context, where substitutions have occurred  If you have any questions, please contact the dictating provider

## 2022-05-17 ENCOUNTER — TELEPHONE (OUTPATIENT)
Dept: INTERNAL MEDICINE CLINIC | Facility: CLINIC | Age: 47
End: 2022-05-17

## 2022-05-17 PROBLEM — I50.21 ACUTE HFREF (HEART FAILURE WITH REDUCED EJECTION FRACTION) (HCC): Status: ACTIVE | Noted: 2022-05-14

## 2022-05-17 PROBLEM — L03.116 CELLULITIS OF LEFT LOWER EXTREMITY: Status: ACTIVE | Noted: 2022-05-10

## 2022-05-17 LAB
ANION GAP SERPL CALCULATED.3IONS-SCNC: 4 MMOL/L (ref 4–13)
BASOPHILS # BLD AUTO: 0.07 THOUSANDS/ΜL (ref 0–0.1)
BASOPHILS NFR BLD AUTO: 1 % (ref 0–1)
BUN SERPL-MCNC: 35 MG/DL (ref 5–25)
CALCIUM SERPL-MCNC: 9.1 MG/DL (ref 8.3–10.1)
CHLORIDE SERPL-SCNC: 94 MMOL/L (ref 100–108)
CO2 SERPL-SCNC: 37 MMOL/L (ref 21–32)
CREAT SERPL-MCNC: 2.32 MG/DL (ref 0.6–1.3)
EOSINOPHIL # BLD AUTO: 0.48 THOUSAND/ΜL (ref 0–0.61)
EOSINOPHIL NFR BLD AUTO: 5 % (ref 0–6)
ERYTHROCYTE [DISTWIDTH] IN BLOOD BY AUTOMATED COUNT: 17.3 % (ref 11.6–15.1)
GFR SERPL CREATININE-BSD FRML MDRD: 32 ML/MIN/1.73SQ M
GLUCOSE SERPL-MCNC: 113 MG/DL (ref 65–140)
GLUCOSE SERPL-MCNC: 133 MG/DL (ref 65–140)
HCT VFR BLD AUTO: 43.9 % (ref 36.5–49.3)
HGB BLD-MCNC: 13.2 G/DL (ref 12–17)
IMM GRANULOCYTES # BLD AUTO: 0.03 THOUSAND/UL (ref 0–0.2)
IMM GRANULOCYTES NFR BLD AUTO: 0 % (ref 0–2)
LYMPHOCYTES # BLD AUTO: 0.84 THOUSANDS/ΜL (ref 0.6–4.47)
LYMPHOCYTES NFR BLD AUTO: 9 % (ref 14–44)
MAGNESIUM SERPL-MCNC: 2.3 MG/DL (ref 1.6–2.6)
MCH RBC QN AUTO: 23.3 PG (ref 26.8–34.3)
MCHC RBC AUTO-ENTMCNC: 30.1 G/DL (ref 31.4–37.4)
MCV RBC AUTO: 78 FL (ref 82–98)
MONOCYTES # BLD AUTO: 1.24 THOUSAND/ΜL (ref 0.17–1.22)
MONOCYTES NFR BLD AUTO: 14 % (ref 4–12)
NEUTROPHILS # BLD AUTO: 6.24 THOUSANDS/ΜL (ref 1.85–7.62)
NEUTS SEG NFR BLD AUTO: 71 % (ref 43–75)
NRBC BLD AUTO-RTO: 0 /100 WBCS
PLATELET # BLD AUTO: 118 THOUSANDS/UL (ref 149–390)
POTASSIUM SERPL-SCNC: 3 MMOL/L (ref 3.5–5.3)
RBC # BLD AUTO: 5.66 MILLION/UL (ref 3.88–5.62)
SODIUM SERPL-SCNC: 135 MMOL/L (ref 136–145)
WBC # BLD AUTO: 8.9 THOUSAND/UL (ref 4.31–10.16)

## 2022-05-17 PROCEDURE — 83735 ASSAY OF MAGNESIUM: CPT | Performed by: INTERNAL MEDICINE

## 2022-05-17 PROCEDURE — 99232 SBSQ HOSP IP/OBS MODERATE 35: CPT | Performed by: INTERNAL MEDICINE

## 2022-05-17 PROCEDURE — 80048 BASIC METABOLIC PNL TOTAL CA: CPT | Performed by: INTERNAL MEDICINE

## 2022-05-17 PROCEDURE — 85025 COMPLETE CBC W/AUTO DIFF WBC: CPT | Performed by: INTERNAL MEDICINE

## 2022-05-17 PROCEDURE — 82948 REAGENT STRIP/BLOOD GLUCOSE: CPT

## 2022-05-17 RX ORDER — POTASSIUM CHLORIDE 14.9 MG/ML
20 INJECTION INTRAVENOUS ONCE
Status: COMPLETED | OUTPATIENT
Start: 2022-05-17 | End: 2022-05-17

## 2022-05-17 RX ORDER — CEPHALEXIN 500 MG/1
500 CAPSULE ORAL EVERY 8 HOURS SCHEDULED
Status: DISCONTINUED | OUTPATIENT
Start: 2022-05-17 | End: 2022-05-18 | Stop reason: HOSPADM

## 2022-05-17 RX ORDER — POTASSIUM CHLORIDE 20 MEQ/1
20 TABLET, EXTENDED RELEASE ORAL ONCE
Status: COMPLETED | OUTPATIENT
Start: 2022-05-17 | End: 2022-05-17

## 2022-05-17 RX ORDER — METOPROLOL TARTRATE 50 MG/1
150 TABLET, FILM COATED ORAL EVERY 12 HOURS SCHEDULED
Status: DISCONTINUED | OUTPATIENT
Start: 2022-05-17 | End: 2022-05-18 | Stop reason: HOSPADM

## 2022-05-17 RX ORDER — POTASSIUM CHLORIDE 14.9 MG/ML
20 INJECTION INTRAVENOUS
Status: COMPLETED | OUTPATIENT
Start: 2022-05-17 | End: 2022-05-17

## 2022-05-17 RX ADMIN — METOPROLOL TARTRATE 150 MG: 50 TABLET, FILM COATED ORAL at 09:34

## 2022-05-17 RX ADMIN — METOPROLOL TARTRATE 150 MG: 50 TABLET, FILM COATED ORAL at 22:17

## 2022-05-17 RX ADMIN — POTASSIUM CHLORIDE 20 MEQ: 1500 TABLET, EXTENDED RELEASE ORAL at 12:24

## 2022-05-17 RX ADMIN — POTASSIUM CHLORIDE 20 MEQ: 14.9 INJECTION, SOLUTION INTRAVENOUS at 12:24

## 2022-05-17 RX ADMIN — CEPHALEXIN 500 MG: 500 CAPSULE ORAL at 09:35

## 2022-05-17 RX ADMIN — APIXABAN 5 MG: 5 TABLET, FILM COATED ORAL at 17:31

## 2022-05-17 RX ADMIN — POTASSIUM CHLORIDE 20 MEQ: 14.9 INJECTION, SOLUTION INTRAVENOUS at 06:44

## 2022-05-17 RX ADMIN — TORSEMIDE 40 MG: 20 TABLET ORAL at 09:35

## 2022-05-17 RX ADMIN — APIXABAN 5 MG: 5 TABLET, FILM COATED ORAL at 09:34

## 2022-05-17 RX ADMIN — PRAVASTATIN SODIUM 80 MG: 80 TABLET ORAL at 17:31

## 2022-05-17 RX ADMIN — ACETAMINOPHEN 650 MG: 325 TABLET, FILM COATED ORAL at 22:20

## 2022-05-17 RX ADMIN — POTASSIUM CHLORIDE 40 MEQ: 1500 TABLET, EXTENDED RELEASE ORAL at 09:34

## 2022-05-17 RX ADMIN — CEPHALEXIN 500 MG: 500 CAPSULE ORAL at 14:37

## 2022-05-17 RX ADMIN — CEPHALEXIN 500 MG: 500 CAPSULE ORAL at 22:17

## 2022-05-17 RX ADMIN — POTASSIUM CHLORIDE 20 MEQ: 14.9 INJECTION, SOLUTION INTRAVENOUS at 14:55

## 2022-05-17 NOTE — TELEPHONE ENCOUNTER
Still inpatient but when discharged please verify insurance and offer TCM appt at [facility] if appropriate, does have a PCP listed already

## 2022-05-17 NOTE — PROGRESS NOTES
INTERNAL MEDICINE RESIDENCY PROGRESS NOTE     Name: Sandrine   Age & Sex: 56 y o  male   MRN: [id number]  Unit/Bed#: [facility] 508-01   Encounter: [id number]  Team: SOD Team B     PATIENT INFORMATION     Name: Sandrine   Age & Sex: 56 y o  male   MRN: [id number]  Hospital Stay Days: 7    ASSESSMENT/PLAN     Principal Problem:    Atrial fibrillation with rapid ventricular response ([address] )  Active Problems:    GLO (acute kidney injury) ([address] )    Hypokalemia    Acute HFrEF (heart failure with reduced ejection fraction) ([facility])    Asthma    Cognitive deficits    Type 2 diabetes mellitus, without long-term current use of insulin ([facility])    Hyperlipidemia    Cellulitis of left lower extremity      * Atrial fibrillation with rapid ventricular response ([address] )  Assessment & Plan   Presented with A fib with RVR   Patient reports he is running out of Eliquis for a few weeks  He did not recall taking lopressor at home   [id number]= 2 (HTN, DM)   ECHO on Feb showed normal EF and no significant valvular disease  Repeat Echo on this admission EF 40%  Gisselle Splinter [facility] consulted   Not started cardizem due to low EF   Discontinue digoxin due to hyperkalemia   IV metoprolol prn for HR >120   Cannot undergo chemical or JET/cardioversion as there is a concern about his medication compliance due to underlying cognitive impairment  Acute HFrEF (heart failure with reduced ejection fraction) ([facility])  Assessment & Plan  Wt Readings from Last 3 Encounters:   05/14/22 121 kg (267 lb 10 2 oz)   02/25/22 116 kg (255 lb)   02/26/15 110 kg (242 lb 4 oz)     · ECHO on Feb showed normal EF and no significant valvular disease  Repeat Echo on this admission EF 40%  · Likely tachyarrhythmia mediated in the setting of A fib with RVR    · Patient appears to be volume overloaded  · Cardiology consulted  · Nephrology started IV lasix 80 mg bid on 5/14/22 and transitioned to po torsemide on 5/17/22  · On metoprolol tartrate for A Fib  · Not on ACEI/ARB due to GLO  · Daily weights  · Is & Os  Net -3 2 L over 24 hours  · Weights down to 242 from 265 lb  · Salt and fluid restriction         Hypokalemia  Assessment & Plan  · K 3 1 this am  · Hypokalemia due to diuresis  · Replaced potassium and mag  Keep K>4 and Mg >2  · Monitor [facility]    GLO (acute kidney injury) ([facility] )  Assessment & Plan  · Creatinine on admission 1 2  Baseline appears to be around 1 2   · Cr 1 2 > 2 35 > 3 > 3 2 > 2 5 > 2 3  · UA showed 1-2 RBC, 3-4 WBC and multiple hyaline casts  Urine eosinophil 0%  · Bladder scan: <30 cc  · Renal US normal, no hydronephrosis, no obstruction  · Patient appears to be volume overloaded  · He received 2 doses of IV vancomycin on admission  Urine eosinophil 0%  · Likely prerenal due to cardiorenal in the setting of severe volume overload vs AIN due to medications (s/p vancomycin 2 doses) Unlikely post renal as no obstruction noted on US  · Nephrology is following  · Avoid nephrotoxin and hypotension  · Kidney function slowly improving with IV diuresis  · Transition to oral torsemide 40 mg daily starting from 05/17  · Follow up with [facility]     Cellulitis of left lower extremity  Assessment & Plan  · Patient was noted to have erythematous rash on left thigh  · He reports of worsening lower extremity swelling, left more than right  VAS negative for DVT  · He was given IV vancomycin 2 doses due to penicillin allergy on admission  Vancomycin was discontinued due to low suspicion for cellulitis however patient developed low-grade fever 99 5, spreading rash on the left thigh, warm to touch  Patient also had increase in leukocytosis  CRP, ESR elevated  Procalcitonin 1 75   · Restarted with IV ceftriaxone with clinical improvement  Patient has been afebrile, leukocytosis resolved  Procalcitonin down to 0 41  · Transition to oral Keflex today to be completed for the total of 5 days course of antibiotics      Hyperlipidemia  Assessment & Plan  · Continue statin     Type 2 diabetes mellitus, without long-term current use of insulin [facility])  Assessment & Plan  Lab Results   Component Value Date    HGBA1C 6 7 (H) 2022       Recent Labs     05/15/22  1606 05/15/22  2120 22  0638 22  1053   POCGLU 133 145* 111 128       Blood Sugar Average: Last 72 hrs:  (P) 143 4     · Monitor BG   · Holding home med metformin  · SSI and accucheck  · Hypoglycemic protocol    Cognitive deficits  Assessment & Plan  · Baseline  Able to communicate clearly  · Live at group home  · He is relatively independent with ADLs  He uses the bus to go to the appointment  However he could not make an office appointment on his own  Will help to set up an appointment upon discharge  Asthma  Assessment & Plan  Stable on albuterol as needed    Hyponatremia-resolved as of 2022  Assessment & Plan  · Na 131 >> 127 >>137  · Patient looks volume overloaded  · Serum osm 295  Urine osm 297  Urine Na 19  · Nephrology consult  · TSH 5 4 and free T4 1 08  Lipid panel: LDL 52, HDL 17, TG 99  · Continue to monitor Na  · Hypervolemic isotonic hyponatremia in the setting of acute CHF exacerbation  · Na improved with diuresis  · Fluid restriction 1 2 L per day      Disposition: Continue med surg with tele  Monitor GLO  SUBJECTIVE     Patient seen and examined  No acute events overnight  Patient complains of burning at the IV site with infusion of potassium chloride  No other complaints  Denies fever, chills, N/V, constipation, diarrhea  Last BM yesterday night      OBJECTIVE     Vitals:    22 0540 22 0541 22 0745 22 1114   BP:   136/97 133/96   BP Location:       Pulse:    91   Resp:    16   Temp:   98 1 °F (36 7 °C) (!) 97 4 °F (36 3 °C)   TempSrc:       SpO2:    90%   Weight: 110 kg (242 lb 4 8 oz) 110 kg (242 lb 4 8 oz)     Height:          Temperature:   Temp (24hrs), Av 9 °F (36 6 °C), Min:97 4 °F (36 3 °C), Max:98 1 °F (36 7 °C)    Temperature: (!) 97 4 °F (36 3 °C)  Intake & Output:  I/O       05/15 07 07 07 07 07 07    P  O  360 640 416    IV Piggyback  188  3     Total Intake(mL/kg) 360 (3 3) 828 3 (7 5) 416 (3 8)    Urine (mL/kg/hr) 6700 (2 5) 4075 (1 5) 200 (1)    Stool       Total Output 6700 4075 200    Net -6340 -3246 7 +216               Weights:   IBW (Ideal Body Weight): 73 kg    Body mass index is 34 77 kg/m²  Weight (last 2 days)     Date/Time Weight    22 0541 110 (242 3)    22 0540 110 (242 3)    22 0534 110 (242 7)    05/15/22 0600 117 (257 06)        Physical Exam  Vitals and nursing note reviewed  Constitutional:       General: He is not in acute distress  Appearance: He is well-developed  HENT:      Head: Normocephalic and atraumatic  Eyes:      Conjunctiva/sclera: Conjunctivae normal    Neck:      Vascular: JVD (11 cm) present  Cardiovascular:      Rate and Rhythm: Normal rate  Rhythm irregular  Heart sounds: No murmur heard  Pulmonary:      Effort: Pulmonary effort is normal  No respiratory distress  Breath sounds: Normal breath sounds  Abdominal:      Palpations: Abdomen is soft  Tenderness: There is no abdominal tenderness  Musculoskeletal:      Cervical back: Neck supple  Right lower le+ Pitting Edema present  Left lower le+ Pitting Edema present  Skin:     General: Skin is warm and dry  Neurological:      Mental Status: He is alert and oriented to person, place, and time  Psychiatric:         Mood and Affect: Mood normal          Behavior: Behavior normal        LABORATORY DATA     Labs: I have personally reviewed pertinent reports    Results from last 7 days   Lab Units 22  0135 22  0532 05/15/22  0605   WBC Thousand/uL 8 90 9 35 10 06   HEMOGLOBIN g/dL 13 2 13 4 12 7   HEMATOCRIT % 43 9 44 4 42 7   PLATELETS Thousands/uL 118* 143* 151   NEUTROS PCT % 71 73 77*   MONOS PCT % 14* 13* 12      Results from last 7 days   Lab Units 05/17/22  0135 05/16/22  0532 05/15/22  0605 05/14/22  0242   POTASSIUM mmol/L 3 0* 3 1* 3 2* 5 7*   CHLORIDE mmol/L 94* 95* 95* 97*   CO2 mmol/L 37* 37* 28 27   BUN mg/dL 35* 43* 45* 43*   CREATININE mg/dL 2 32* 2 50* 3 11* 3 22*   CALCIUM mg/dL 9 1 9 6 9 2 8 6   ALK PHOS U/L  --   --   --  90   ALT U/L  --   --   --  31   AST U/L  --   --   --  109*     Results from last 7 days   Lab Units 05/17/22  0135 05/16/22  0532 05/15/22  0605   MAGNESIUM mg/dL 2 3 1 8 2 0     Results from last 7 days   Lab Units 05/14/22  0242   PHOSPHORUS mg/dL 4 9*      Results from last 7 days   Lab Units 05/12/22  0449 05/11/22  0236 05/10/22  2021   PTT seconds 40* 70* 68*               IMAGING & DIAGNOSTIC TESTING     Radiology Results: I have personally reviewed pertinent reports  XR chest 1 view portable    Result Date: 5/10/2022  Impression: Borderline cardiomegaly  Mild vascular congestion  Bilateral pleural effusions  Workstation performed: [id number]     XR chest pa & lateral    Result Date: 5/13/2022  Impression: Mild interstitial pulmonary edema and basilar loculated effusions  Workstation performed: [id number]     US kidney and bladder    Result Date: 5/13/2022  Impression: Normal  Workstation performed: [id number]     Other Diagnostic Testing: I have personally reviewed pertinent reports      ACTIVE MEDICATIONS     Current Facility-Administered Medications   Medication Dose Route Frequency    acetaminophen (TYLENOL) tablet 650 mg  650 mg Oral Q6H PRN    albuterol (PROVENTIL HFA,VENTOLIN HFA) inhaler 2 puff  2 puff Inhalation Q4H PRN    apixaban (ELIQUIS) tablet 5 mg  5 mg Oral BID    calcium carbonate (TUMS) chewable tablet 500 mg  500 mg Oral Daily PRN    cephalexin (KEFLEX) capsule 500 mg  500 mg Oral Q8H [facility]    metoprolol (LOPRESSOR) injection 5 mg  5 mg Intravenous Q6H PRN    metoprolol tartrate (LOPRESSOR) tablet 150 mg  150 mg Oral Q12H GLENYS    potassium chloride (K-DUR,KLOR-CON) CR tablet 40 mEq  40 mEq Oral Daily    potassium chloride 20 mEq IVPB (premix)  20 mEq Intravenous Q2H    pravastatin (PRAVACHOL) tablet 80 mg  80 mg Oral Daily With Dinner    torsemide (DEMADEX) tablet 40 mg  40 mg Oral Daily       VTE Pharmacologic Prophylaxis: Apixaban (Eliquis)  VTE Mechanical Prophylaxis: foot pump applied    Portions of the record may have been created with voice recognition software  Occasional wrong word or "sound a like" substitutions may have occurred due to the inherent limitations of voice recognition software    Read the chart carefully and recognize, using context, where substitutions have occurred   ==  Chucky Handy MD  [address] [facility]  Internal Medicine Residency PGY-2

## 2022-05-17 NOTE — PROGRESS NOTES
Spiritual Care Progress Note    2022  Patient: Andres Mills : 1975  Admission Date & Time: 5/10/2022 1304  MRN: [id number] CSN: [id number]       visited patient during unit rounds   provided introduction to spiritual care and inquired about patient's emotional and physical wellbeing  Patient noted some discomfort in his arm due to potassium intake and named his father as a support system  Patient expressed humor and gratitude during the visit  Spiritual care will remain available               Chaplaincy Interventions Utilized:   Empowerment: Provided chaplaincy education    Exploration: Explored emotional needs & resources    Relationship Building: Cultivated a relationship of care and support      Chaplaincy Outcomes Achieved:  Expressed gratitude      Spiritual Coping Strategies Utilized:   Spiritual meaning     22 1000   Clinical Encounter Type   Visited With Patient   Routine Visit Introduction

## 2022-05-17 NOTE — PROGRESS NOTES
Follow up Consultation    Nephrology   Venessa Kerr 56 y o  male MRN: [id number]  Unit/Bed#: [facility] 508-01 Encounter: [id number]      Physician Requesting Consult: Yemi Weathers MD        ASSESSMENT/PLAN:  43-year-old male with multiple comorbidities including hypertension, diabetes, AFib, documented cognitive impairment admitted with palpitations found to have AFib with RVR  Nephrology following for acute kidney injury  Acute kidney injury :  GLO multifactorial most likely secondary to cardiorenal syndrome in light of hemodynamic perturbations in light of AFib with RVR and volume overload  [facility] as well as Care everywhere it appears that the patient has a baseline Creatinine of 1-1 1 mg/dL  patient was admitted with a creatinine of 1 2 mg/dL on 05/10/2022  Peak creatinine this hospitalization at 3 22 on 05/14/2022  patient's creatinine today is at 2 32 mg/dL, stable and improving  Renal ultrasound with no hydronephrosis  No indication for dialysis  check BMP in a m  Await renal recovery  Optimize hemodynamic status to avoid delay in renal recovery  Place on a renal diet when allowed diet order  Avoid nephrotoxins, adjust meds to appropriate GFR  Strict I/O  Daily weights  Urinary retention protocol if patient does not have a Sellers  will need to set up patient for follow up with Nephrology as an outpatient post hospitalization  for nephrology as an outpatient patient follows up with no nephrologist  If renal parameters continue to improve in her stable in next 24 hours patient stable for discharge from renal standpoint will need close outpatient follow-up  Blood pressure/hypertension:  current medications: Torsemide 40 mg p o  Q day  metoprolol 100 mg p o  T i d   recommendations:  No changes for now  Optimize hemodynamics  Maintain MAP > 65mmHg  Avoid BP fluctuations      H/H/anemia:  most recent hemoglobin at 13 2 grams/deciliter  maintain hemoglobin greater than 8 grams/deciliter    Acid-base electrolytes:    Electrolytes:    Hyponatremia:   Most recent sodium at 135 mEq  Remains stable  Continue fluid restriction 1 2 L per day    Hypokalemia:  Likely secondary to diuresis  Most recent potassium 3 0 mEq  Will supplement with additional potassium today  K-Dur 40 mEq p o  Q day    Acid-base:    Most recent bicarb at 37 likely secondary to diuresis  Changed over to p o  Diuretics from today    Other medical problems:  Proteinuria: Most recent protein creatinine ratio 750 mg  CHF exacerbation:  Management per primary team   Most recent echo with EF of 40%  Started on torsemide 40 mg p o  Q day from today      Thanks for the consult  Will continue to follow  Please call with questions/ concerns  Above-mentioned orders and Plan in terms of acute kidney injury was discussed with the team in [address] MD[facility] 11:29 AM              Objective :   Patient seen and examined in his room no overnight events hemodynamically stable urine output close to 4 L last 24 hours very happy to hear renal parameters continue to improve thankful for all the care information that he has gotten  Standing weight appears to be stable from yesterday however he did have significant volume loss  PHYSICAL EXAM  /96   Pulse 91   Temp (!) 97 4 °F (36 3 °C)   Resp 16   Ht 5' 10" (1 778 m)   Wt 110 kg (242 lb 4 8 oz)   SpO2 90%   BMI 34 77 kg/m²   Temp (24hrs), Av 9 °F (36 6 °C), Min:97 4 °F (36 3 °C), Max:98 1 °F (36 7 °C)        Intake/Output Summary (Last 24 hours) at 2022 1129  Last data filed at 2022 0901  Gross per 24 hour   Intake 894 33 ml   Output 3325 ml   Net -2430 67 ml       I/O last 24 hours: In: 1244 3 [P O :1056; IV Piggyback:188 3]  Out: 4475 [Urine:4475]      Current Weight: Weight - Scale: 110 kg (242 lb 4 8 oz)  First Weight: Weight - Scale: 124 kg (272 lb 14 9 oz)  Physical Exam  Vitals and nursing note reviewed     Constitutional: General: He is not in acute distress  Appearance: Normal appearance  He is obese  He is not ill-appearing, toxic-appearing or diaphoretic  HENT:      Head: Normocephalic and atraumatic  Mouth/Throat:      Mouth: Mucous membranes are moist       Pharynx: Oropharynx is clear  Eyes:      General: No scleral icterus  Conjunctiva/sclera: Conjunctivae normal    Cardiovascular:      Rate and Rhythm: Normal rate  Heart sounds: Normal heart sounds  No murmur heard  No friction rub  Pulmonary:      Effort: Pulmonary effort is normal  No respiratory distress  Breath sounds: Normal breath sounds  No stridor  No wheezing  Abdominal:      General: There is no distension  Palpations: Abdomen is soft  There is no mass  Tenderness: There is no abdominal tenderness  Musculoskeletal:         General: Swelling present  Cervical back: Normal range of motion and neck supple  No rigidity  Comments: Trace edema bilateral extremities   Skin:     General: Skin is warm  Coloration: Skin is not jaundiced  Neurological:      General: No focal deficit present  Mental Status: He is alert and oriented to person, place, and time  Mental status is at baseline  Psychiatric:         Mood and Affect: Mood normal          Behavior: Behavior normal              Review of Systems   Constitutional: Negative for appetite change, chills, fatigue and fever  HENT: Negative for congestion  Respiratory: Negative for cough, shortness of breath and wheezing  Cardiovascular: Positive for leg swelling  Gastrointestinal: Negative for abdominal pain, constipation, diarrhea, nausea and vomiting  Genitourinary: Negative for hematuria  Musculoskeletal: Negative for back pain  Neurological: Negative for dizziness and headaches  Psychiatric/Behavioral: Negative for agitation and confusion  All other systems reviewed and are negative        Scheduled Meds:  Current Facility-Administered Medications   Medication Dose Route Frequency Provider Last Rate    acetaminophen  650 mg Oral Q6H PRN Alirio Osorio MD      albuterol  2 puff Inhalation Q4H PRN Alirio Osorio MD      apixaban  5 mg Oral BID Shelby Landry MD      calcium carbonate  500 mg Oral Daily PRN Shelby Landry MD      cephalexin  500 mg Oral Q8H [address] Ei Rhys Luu MD      metoprolol  5 mg Intravenous Q6H PRN Shelby Landry MD      metoprolol tartrate  150 mg Oral Q12H [address] Ei Rhys Luu MD      potassium chloride  40 mEq Oral Daily Shelby Landry MD      pravastatin  80 mg Oral Daily With Bing Robledo MD      torsemide  40 mg Oral Daily Francia Anders MD         PRN Meds:   acetaminophen    albuterol    calcium carbonate    metoprolol    Continuous Infusions:       Invasive Devices:      Invasive Devices  Report    Peripheral Intravenous Line  Duration           Peripheral IV 05/14/22 Distal;Right;Ventral (anterior) Forearm 2 days                  LABORATORY:    Results from last 7 days   Lab Units 05/17/22  0135 05/16/22  0532 05/15/22  0605 05/14/22  0242 05/13/22  0509 05/12/22  0449 05/11/22  0000 05/10/22  1323   WBC Thousand/uL 8 90 9 35 10 06 12 14* 13 86*  --   --  8 40   HEMOGLOBIN g/dL 13 2 13 4 12 7 12 8 12 7  --   --  13 4   HEMATOCRIT % 43 9 44 4 42 7 44 1 44 5  --   --  45 9   PLATELETS Thousands/uL 118* 143* 151 210 205  --   --  220   POTASSIUM mmol/L 3 0* 3 1* 3 2* 5 7* 4 6 4 6 3 9 3 7   CHLORIDE mmol/L 94* 95* 95* 97* 99* 102 104 103   CO2 mmol/L 37* 37* 28 27 25 22 20* 25   BUN mg/dL 35* 43* 45* 43* 35* 28* 19 17   CREATININE mg/dL 2 32* 2 50* 3 11* 3 22* 3 02* 2 35* 1 23 1 20   CALCIUM mg/dL 9 1 9 6 9 2 8 6 8 8 8 7 8 7 9 3   MAGNESIUM mg/dL 2 3 1 8 2 0 2 2  --   --  2 1  --    PHOSPHORUS mg/dL  --   --   --  4 9*  --   --   --   --       rest all reviewed    RADIOLOGY:  XR chest pa & lateral   Final Result by Court Osborne MD (05/13 1979)   Mild interstitial pulmonary edema and basilar loculated effusions  Workstation performed: [id number]         US kidney and bladder   Final Result by Annie Montano MD (05/13 0554)      Normal              Workstation performed: [id number]         VAS lower limb venous duplex study, complete bilateral   Final Result by Shivani Flower MD (05/10 8422)      XR chest 1 view portable   Final Result by Donald Mayen MD (05/10 1526)      Borderline cardiomegaly  Mild vascular congestion  Bilateral pleural effusions  Workstation performed: [id number]           Rest all reviewed    Portions of the record may have been created with voice recognition software  Occasional wrong word or "sound a like" substitutions may have occurred due to the inherent limitations of voice recognition software  Read the chart carefully and recognize, using context, where substitutions have occurred  If you have any questions, please contact the dictating provider

## 2022-05-18 ENCOUNTER — TELEPHONE (OUTPATIENT)
Dept: NEPHROLOGY | Facility: CLINIC | Age: 47
End: 2022-05-18

## 2022-05-18 ENCOUNTER — HOME HEALTH ADMISSION (OUTPATIENT)
Dept: HOME HEALTH SERVICES | Facility: HOME HEALTHCARE | Age: 47
End: 2022-05-18
Payer: MEDICARE

## 2022-05-18 VITALS
BODY MASS INDEX: 33.04 KG/M2 | WEIGHT: 230.8 LBS | HEIGHT: 70 IN | OXYGEN SATURATION: 91 % | HEART RATE: 91 BPM | TEMPERATURE: 97.9 F | DIASTOLIC BLOOD PRESSURE: 88 MMHG | SYSTOLIC BLOOD PRESSURE: 126 MMHG | RESPIRATION RATE: 18 BRPM

## 2022-05-18 LAB
ANION GAP SERPL CALCULATED.3IONS-SCNC: 3 MMOL/L (ref 4–13)
BUN SERPL-MCNC: 34 MG/DL (ref 5–25)
CALCIUM SERPL-MCNC: 9.2 MG/DL (ref 8.3–10.1)
CHLORIDE SERPL-SCNC: 95 MMOL/L (ref 100–108)
CO2 SERPL-SCNC: 41 MMOL/L (ref 21–32)
CREAT SERPL-MCNC: 1.81 MG/DL (ref 0.6–1.3)
GFR SERPL CREATININE-BSD FRML MDRD: 43 ML/MIN/1.73SQ M
GLUCOSE SERPL-MCNC: 112 MG/DL (ref 65–140)
PHOSPHATE SERPL-MCNC: 4 MG/DL (ref 2.7–4.5)
POTASSIUM SERPL-SCNC: 3.4 MMOL/L (ref 3.5–5.3)
SODIUM SERPL-SCNC: 139 MMOL/L (ref 136–145)

## 2022-05-18 PROCEDURE — 80048 BASIC METABOLIC PNL TOTAL CA: CPT | Performed by: INTERNAL MEDICINE

## 2022-05-18 PROCEDURE — 99232 SBSQ HOSP IP/OBS MODERATE 35: CPT | Performed by: INTERNAL MEDICINE

## 2022-05-18 PROCEDURE — 84100 ASSAY OF PHOSPHORUS: CPT | Performed by: INTERNAL MEDICINE

## 2022-05-18 PROCEDURE — 99239 HOSP IP/OBS DSCHRG MGMT >30: CPT | Performed by: INTERNAL MEDICINE

## 2022-05-18 RX ORDER — METOPROLOL TARTRATE 75 MG/1
150 TABLET, FILM COATED ORAL EVERY 12 HOURS SCHEDULED
Qty: 120 TABLET | Refills: 1 | Status: SHIPPED | OUTPATIENT
Start: 2022-05-18 | End: 2022-07-13

## 2022-05-18 RX ORDER — POTASSIUM CHLORIDE 20 MEQ/1
20 TABLET, EXTENDED RELEASE ORAL DAILY
Qty: 30 TABLET | Refills: 0 | Status: SHIPPED | OUTPATIENT
Start: 2022-05-18 | End: 2022-06-15 | Stop reason: SDUPTHER

## 2022-05-18 RX ORDER — POTASSIUM CHLORIDE 20 MEQ/1
40 TABLET, EXTENDED RELEASE ORAL ONCE
Status: COMPLETED | OUTPATIENT
Start: 2022-05-18 | End: 2022-05-18

## 2022-05-18 RX ORDER — TORSEMIDE 20 MG/1
20 TABLET ORAL DAILY
Qty: 30 TABLET | Refills: 1 | Status: SHIPPED | OUTPATIENT
Start: 2022-05-18 | End: 2022-07-28 | Stop reason: SDUPTHER

## 2022-05-18 RX ORDER — TORSEMIDE 20 MG/1
20 TABLET ORAL DAILY
Status: DISCONTINUED | OUTPATIENT
Start: 2022-05-18 | End: 2022-05-18 | Stop reason: HOSPADM

## 2022-05-18 RX ADMIN — CEPHALEXIN 500 MG: 500 CAPSULE ORAL at 05:40

## 2022-05-18 RX ADMIN — METOPROLOL TARTRATE 150 MG: 50 TABLET, FILM COATED ORAL at 08:20

## 2022-05-18 RX ADMIN — POTASSIUM CHLORIDE 40 MEQ: 1500 TABLET, EXTENDED RELEASE ORAL at 08:20

## 2022-05-18 RX ADMIN — APIXABAN 5 MG: 5 TABLET, FILM COATED ORAL at 08:20

## 2022-05-18 RX ADMIN — TORSEMIDE 20 MG: 20 TABLET ORAL at 08:20

## 2022-05-18 RX ADMIN — POTASSIUM CHLORIDE 40 MEQ: 1500 TABLET, EXTENDED RELEASE ORAL at 08:21

## 2022-05-18 NOTE — TELEPHONE ENCOUNTER
----- Message from Leonel Mcahado MD sent at 5/18/2022 12:13 PM EDT -----  Regarding: ashley  Please schedule the patient for hospital discharge follow-up for acute kidney injury to be seen in 3 weeks  Please send the patient orders for renal function panel 5/24 and again prior to the visit  Patient usually follows up with no nephrologist as an outpatient      Stalin gimenez

## 2022-05-18 NOTE — DISCHARGE INSTRUCTIONS
Acute Kidney Injury (GLO)  You have been diagnosed with Acute Kidney Injury (GLO)  The following information has been developed to provide you with information about GLO and treatment  What is Acute Kidney Injury (GLO)? GLO occurs when kidney function decreases over a short period of time  This condition causes a buildup of waste products in the blood and can cause fluid to build up causing swelling in the legs and shortness of breath  Sometimes called Acute Kidney Failure or Acute Renal Failure, GLO is often reversible if it is found and treated quickly  How do you know if you have GLO? GLO is diagnosed by assessing kidney function  This is done by obtaining a blood test to measure the blood level of creatinine  Decreased urine output can sometimes also indicate GLO  Who is at risk for GLO? GLO can happen to anyone but usually happens to people who are already sick and may be in the hospital  People are at higher risk for GLO if they have any of the following:  age 72 years or older  high or low blood pressure  underlying kidney disease (e g , Chronic Kidney Disease (CKD)  peripheral vascular disease (hardening of arteries)  chronic diseases such as liver disease, heart disease and diabetes  a single kidney    What are the symptoms of GLO? You may or may not have the symptoms to suggest you have kidney injury until the GLO has progressed  Some of the symptoms are listed below:  Not making enough urine  Increased swelling in legs   Feeling tired  Trouble breathing or shortness of breath  Nausea  New or worsening confusion    What causes GLO?   The causes are divided into three categories:  Not enough blood flowing to the kidneys (e g , low blood pressure, bleeding, diarrhea, dehydration)  Injury directly to the kidneys (e g , blood clots, severe infections such as sepsis, medicine toxicity, IV contrast dye used for cardiac catheterization or CT scans)  Blockage to the tubes (ureters) that drain the urine from the kidneys (e g , enlarged prostate, kidney stones, blood clots)    What is the treatment for GLO? The treatment for GLO depends on correcting what caused it  Treatment usually involves removing the cause and measures to prevent further injury to the kidneys  This may require the use of intravenous fluids or medications and/or temporary dialysis  Dialysis is a process using a machine that does the job of the kidneys to remove waste and help correct the electrolyte and fluid balance while the kidneys are recovering  If dialysis is needed to treat GLO, the doctor will assess daily to see if the kidneys are showing signs of recovery  The daily assessments determine how long dialysis needs to continue  Depending on the cause and the extent of damage, an episode of GLO may resolve in a few days to several weeks to several months  What are the long term effects of having an episode of GLO? People who have one episode of GLO are at an increased risk of having another episode of GLO as well as other health problems such as kidney disease, stroke, and heart disease  In a small number of people who had unrecognized kidney disease, an GLO episode may result in Chronic Kidney Disease (CKD) which requires lifelong monitoring and treatment  How do you prevent future episodes of GLO? Make sure to follow up with the kidney doctor after hospital discharge and obtain blood work to reassess and monitor kidney function  If you have diabetes, keep your blood sugar in goal range and keep appointments with your diabetes specialist    If you have high blood pressure, have your blood pressure checked regularly to make sure it is in target range  If you take blood pressure medicine called ACEIs or ARBs (e g , Lisinopril, Enalapril, Diovan, Losartan), your doctor may tell you to skip a dose or two if you have severe dehydration and your blood pressure is running low    Avoid using medicines such as NSAIDs (Nonsteroidal Anti-inflammatory Drugs) and Jacobsen-2 Inhibitors (a type of NSAID) that may be harmful to kidney function  These may include the medicines listed in the table that follows  Examples of NSAIDS and Jacobsen-2 Inhibitors   Talk to your doctor or healthcare provider before stopping any medicine ordered for you  Celecoxib (CELEBREX) Ketoprofen (ORUDIS, ORUVAIL)   Diclofenac (VOLTAREN, CATAFLAM) Ketorolac (TORADOL)   Diflunisal (DOLOBID) Meloxicam (MOBIC)   Etodolac (LODINE) Nabumetone (RELAFEN)   Fenoprofen (NALFON) Naproxen (ALEVE, NAPROSYN,    NAPRELAN, ANAPROX)   Flurbiprofen (ANSAID) Oxaprozin (DAYPRO)   Ibuprofen (MOTRIN, ADVIL) Piroxicam (FELDENE)   Indomethacin (INDOCIN) Sulindac (CLINORIL)    Tolmetin (Alexy Dense)     Is there a special diet for people with GLO? People with GLO and/or other kidney disease often have high potassium and phosphorus levels in their blood  To protect the kidneys from further injury and to avoid complications, most doctors recommend following a healthy diet choosing foods low potassium and low phosphorus  Limiting dietary potassium to 2 5 grams/day and phosphorus to 800 milligrams/day is recommended  A dietitian can help you with learning more about this type of diet  The tables on the following page may help you to choose lower potassium and phosphorus foods  The following websites are also good sources of information:  [employer] at  org/nutrition/Kidney-Disease-Stages-1-4   [employer]   https://www niddk nih gov/health-information/kidney-disease/chronic-kidney-disease-ckd/eating-nutrition  https://[facility] org/health/articles/15641-renal-diet-basics  [employer] com cy    If you have any questions or concerns about your condition, please contact your doctor or healthcare provider    These tables may help you to choose lower potassium and phosphorus foods    AVOID these higher phosphorus* foods: CHOOSE these lower phosphorus* foods:   Milk, pudding , yogurt made from animals and from many soy varieties Rice milk (unfortified), nondairy creamer   Hard cheeses, ricotta, cottage cheese, fat-free cream cheese Regular and low-fat cream cheese   Ice cream, frozen yogurt Sherbet, frozen fruit pops, sorbet   Soups made with milk, dried peas, beans, lentils or other high phosphorus ingredients Soups made with broth, are water-based, or other lower phosphorus ingredients   Whole grains, including whole-grain breads, crackers, cereal, rice and pasta, corn tortillas Refined grains, including white bread, crackers, cereals, rice and pasta   Quick breads, biscuits, cornbread, muffins, pancakes, waffles, granola, wheat germ Homemade refined (white) dinner rolls, bagels, English muffins, sugar cookies, shortbread cookies, francesca food cake   Dried peas (split, black-eyed), beans (black, garbanzo, lima, kidney, navy, chisholm), lentils Green peas (canned, frozen), green beans, wax beans   Organ meats, walleye, [place], sardines Lean beef, pork, lamb, poultry, other fish   Nuts and seeds Popcorn   Peanut butter, other nut butters; tofu, veggie or soy burgers Jam, jelly, honey   Chocolate, including chocolate drinks Carob (chocolate-flavored) candy, hard candy,  gumdrops   Jayro, pepper-type sodas, flavored javed, bottled teas, beer Lemon-lime soda, ginger ale or root beer, plain water, cream soda, grape soda   *Always read labels and avoid foods with ingredients containing "phos"  AVOID these higher potassium foods: CHOOSE these lower potassium foods:      Milk (fat free, low fat, whole, buttermilk, Soy), yogurt    Regular and low-fat cream cheese      Beans (white, [place]), [place] sprouts, spinach Swiss       chard, broccoli, avocado, artichoke, potatoes, sweet      potatoes, tomatoes/tomato sauce, beet greens      Green beans, alfalfa sprouts, bamboo shoots (canned), cabbage, carrots, cauliflower, corn, cucumber, eggplant,      endive, lettuce, mushrooms, onions, radishes,  watercress,       water chestnuts (canned), rice, peas      Halibut, tuna, cod, snapper, tuna fish, turkey    Egg, lean beef, pork, lamb, shellfish, chicken       Banana, papaya, orange, cantaloupe, dates, raisins and      other dried fruit, pomegranate, avocado      Apple, applesauce, blackberries, raspberries, pears,     watermelon, cucumbers, blueberries, cranberries,       peaches      Almonds, peanuts, hazelnuts, [place], cashew, mixed,      seeds (sunflower, pumpkin)     Homemade refined (white) dinner rolls, bagels,      English muffins, flour tortilla, crackers, brii      crackers, popcorn, pretzels, spaghetti or macaroni,       hummus      Tomato or vegetable juice, prune juice    Papaya, cipriano, or pear nectar, cranberry juice cocktail      Molasses

## 2022-05-18 NOTE — DISCHARGE SUMMARY
INTERNAL MEDICINE RESIDENCY DISCHARGE SUMMARY     Sandrine   56 y o  male  MRN: [id number]  Room/Bed: [facility] 50/[facility] 508-01     [address] [place] [address] MED SURG/SD   Encounter: [id number]    Principal Problem:    Atrial fibrillation with rapid ventricular response ([address] )  Active Problems:    GLO (acute kidney injury) ([address] )    Hypokalemia    Acute HFrEF (heart failure with reduced ejection fraction) ([facility])    Asthma    Cognitive deficits    Type 2 diabetes mellitus, without long-term current use of insulin ([facility])    Hyperlipidemia    Cellulitis of left lower extremity      * Atrial fibrillation with rapid ventricular response ([address] )  Assessment & Plan   Presented with A fib with RVR   Patient reports he is running out of Eliquis for a few weeks  He did not recall taking lopressor at home   [id number]= 2 (HTN, DM)   ECHO on Feb showed normal EF and no significant valvular disease  Repeat Echo on this admission EF 40%  [facility] Cardiology consulted   Not started cardizem due to low EF   Discontinue digoxin due to hyperkalemia   IV metoprolol prn for HR >120   Cannot undergo chemical or JET/cardioversion as there is a concern about his medication compliance due to underlying cognitive impairment   Currently rate controlled with metoprolol tartrate 150 mg bid   Eliquis price checked  Continue eliquis 5 mg bid   Follow up with cardiology on 5/25    Acute HFrEF (heart failure with reduced ejection fraction) ([facility])  Assessment & Plan  Wt Readings from Last 3 Encounters:   05/14/22 121 kg (267 lb 10 2 oz)   02/25/22 116 kg (255 lb)   02/26/15 110 kg (242 lb 4 oz)     · ECHO on Feb showed normal EF and no significant valvular disease  Repeat Echo on this admission EF 40%  · Likely tachyarrhythmia mediated in the setting of A fib with RVR    · Patient appears to be volume overloaded  · Cardiology consulted  · Nephrology started IV lasix 80 mg bid on 5/14/22 and transitioned to po torsemide on 5/17/22  · On metoprolol tartrate for A Fib  · Not on ACEI/ARB due to GLO  · Daily weights  · Is & Os  Net -2 9 L over 24 hours  · Weights down to 242 from 265 lb  · Salt and fluid restriction         Hypokalemia  Assessment & Plan  · K 3 4 this am  · Hypokalemia due to diuresis  · Replaced potassium and mag  Keep K>4 and Mg >2  · Monitor BMP    GLO (acute kidney injury) ([facility] )  Assessment & Plan  · Creatinine on admission 1 2  Baseline appears to be around 1 2   · Cr 1 2 > 2 35 > 3 > 3 2 > 2 5 > 2 3 > 1 8  · UA showed 1-2 RBC, 3-4 WBC and multiple hyaline casts  Urine eosinophil 0%  · Bladder scan: <30 cc  · Renal US normal, no hydronephrosis, no obstruction  · Patient appears to be volume overloaded  · He received 2 doses of IV vancomycin on admission  Urine eosinophil 0%  · Likely prerenal due to cardiorenal in the setting of severe volume overload vs AIN due to medications (s/p vancomycin 2 doses) Unlikely post renal as no obstruction noted on US  · Nephrology is following  · Avoid nephrotoxin and hypotension  · Kidney function slowly improving with IV diuresis  · Continue to improve with diuresis  · Transition to po torsemide  Continue po torsemide 20 mg qd upon discharge    Cellulitis of left lower extremity  Assessment & Plan  · Patient was noted to have erythematous rash on left thigh  · He reports of worsening lower extremity swelling, left more than right  VAS negative for DVT  · He was given IV vancomycin 2 doses due to penicillin allergy on admission  Vancomycin was discontinued due to low suspicion for cellulitis however patient developed low-grade fever 99 5, spreading rash on the left thigh, warm to touch  Patient also had increase in leukocytosis  CRP, ESR elevated  Procalcitonin 1 75   · Restarted with IV ceftriaxone with clinical improvement  Patient has been afebrile, leukocytosis resolved  Procalcitonin down to 0 41    · Completed total of 5 days course of antibiotics  Hyperlipidemia  Assessment & Plan  · Continue statin     Type 2 diabetes mellitus, without long-term current use of insulin [facility])  Assessment & Plan  Lab Results   Component Value Date    HGBA1C 6 7 (H) 02/25/2022       Recent Labs     05/16/22  1053 05/16/22  1625 05/16/22  2037 05/17/22  0638   POCGLU 128 123 135 113       Blood Sugar Average: Last 72 hrs:  (P) 228 [id number]     · Monitor BG   · Holding home med metformin  · SSI and accucheck  · Hypoglycemic protocol    Cognitive deficits  Assessment & Plan  · Baseline  Able to communicate clearly  · Live at group home  · He is relatively independent with ADLs  He uses the bus to go to the appointment  However he could not make an office appointment on his own  Will help to set up an appointment upon discharge  Asthma  Assessment & Plan  Stable on albuterol as needed    Hyponatremia-resolved as of 5/16/2022  Assessment & Plan  · Na 131 >> 127 >>137  · Patient looks volume overloaded  · Serum osm 295  Urine osm 297  Urine Na 19  · Nephrology consult  · TSH 5 4 and free T4 1 08  Lipid panel: LDL 52, HDL 17, TG 99  · Continue to monitor Na  · Hypervolemic isotonic hyponatremia in the setting of acute CHF exacerbation  · Na improved with diuresis  · Fluid restriction 1 2 L per day      [address]     Per H&P written by Dr Luis Main on 5/10/22:  "Manjeet Ernst is a 56 y o  male with a PMH of type 2 diabetes, atrial fibrillation, hypertension, hyperlipidemia, cognitive deficit who presents with palpitation  Patient lives in [place] [address] due to cognitive deficit  Patient was diagnosed with AFib in February 2022, started on metoprolol tartrate 50 mg b i d  And Eliquis 5 mg b i d   For the past month patient did not refill his medications  He woke up this morning and he felt his heart was racing  He denies any headache, dizziness, lightheadedness, chest pain, nausea, vomiting diarrhea constipation    Denies any weakness or numbness in his arms or legs  He noticed swelling of his left leg "    Patient was initially admitted to [facility] then transferred to [facility] for Afib with RVR secondary to medication noncompliance  Echo showed a decrease in ejection fraction from normal at previous admission to 40% this admission  [id number] =2  Metoprolol tartrate was restarted, heparin and cardizem drips were started  Telemetry monitoring was started  Rate was not controlled with caridzem so it was D/C'd and digoxin was started  Digoxin was D/C'd after 3 days due to hyperkalemia  Metoprolol was titrated up to 150mg BID  Patient cannot undergo JET/cardioversion as patient is not compliant with medications and underlying cognitive disability  Patient was diuresed on lasix and salt and fluid restrictions for volume overload  Transitioned to PO torsemide  Patient presented with leg swelling concerning for cellulitis  He received 2 doses vancomycin in the ED  Thought to be chronic venous stasis dermatitis so vancomycin was discontinued  Redness and petechiae were noted 5/13 and empiric treatment with IV ceftriaxone was started  Patient was transitioned to PO keflex to complete the 5 day course of antibiotics  Patient developed GLO on 5/12 with creatinine increase from 1 2 on admission to 2 35  Nephrology was consulted  Renal US was normal  ESR and CRP elevated  Patient seemed volume overloaded but Cr worsened with lasix  Cr peaked at 3 2  Resolved in the following days with diuresis  Set up the appointment with cardiology and PCP in 1-2 weeks after discharge  CM helped with [facility]  DISCHARGE INFORMATION     Physical Exam  Constitutional:       General: He is not in acute distress  Appearance: He is obese  HENT:      Head: Normocephalic and atraumatic  Neck:      Vascular: JVD present  Cardiovascular:      Rate and Rhythm: Normal rate  Rhythm irregular  Heart sounds: No murmur heard    Pulmonary:      Effort: Pulmonary effort is normal  No respiratory distress  Breath sounds: Normal breath sounds  Abdominal:      General: There is no distension  Tenderness: There is no abdominal tenderness  Musculoskeletal:      Cervical back: Neck supple  Right lower le+ Pitting Edema present  Left lower le+ Pitting Edema present  Skin:     General: Skin is warm and dry  Neurological:      Mental Status: He is alert and oriented to person, place, and time  Psychiatric:         Mood and Affect: Mood normal          Behavior: Behavior normal            FOLLOW-UP     PCP Outpatient Follow-up:  Dr Corine Hayden on     Consulting Providers Follow-up:  Cardiology  Nephrology    Active Issues Requiring Follow-up:   Afib   CHF  GLO on CKD    Discharge Statement:   I spent 1 hour minutes discharging the patient  This time was spent on the day of discharge  I had direct contact with the patient on the day of discharge  Additional documentation is required if more than 30 minutes were spent on discharge  Portions of the record may have been created with voice recognition software  Occasional wrong word or "sound a like" substitutions may have occurred due to the inherent limitations of voice recognition software    Read the chart carefully and recognize, using context, where substitutions have occurred     ==  Chucky Murphy MD  [facility]  Internal Medicine Resident PGY-2

## 2022-05-18 NOTE — CASE MANAGEMENT
Case Management Discharge Planning Note    Patient name Sophy Potts  [facility] [address] [facility] [id number]  : 1975 Date 2022       Current Admission Date: 5/10/2022  Current Admission Diagnosis:Atrial fibrillation with rapid ventricular response [facility])   Patient Active Problem List    Diagnosis Date Noted    Hypokalemia 2022    Acute HFrEF (heart failure with reduced ejection fraction) ([facility] ) 2022    GLO (acute kidney injury) ([place] [address] ) 2022    Hyperlipidemia 05/10/2022    Cellulitis of left lower extremity 05/10/2022    Hypertension 2022    Asthma 2022    Atrial fibrillation with rapid ventricular response ([place] [address] ) 2022    Elevated liver enzymes 2022    Elevated d-dimer 2022    Seroma due to trauma ([place] [address] ) 2022    Bilateral pleural effusion 2022    Hiatal hernia 2022    Dyspnea on minimal exertion 2022    Obesity 2022    Cognitive deficits 2022    Cardiomegaly 2022    Diverticulosis of colon without diverticulitis 2022    Type 2 diabetes mellitus, without long-term current use of insulin ([place] [address] ) 2022      LOS (days): 8  Geometric Mean LOS (GMLOS) (days): 3 50  Days to GMLOS:-4 5     OBJECTIVE:  Risk of Unplanned Readmission Score: 16 48         Current admission status: Inpatient   Preferred Pharmacy:    [facility] [place] - [address]  Phone: [phone] Fax: [phone]    [id number], [address] [facility] [address], [address], [address] [place] [address]  Phone: [phone] Fax: [address] [facility], [address] [facility] [address] [facility] [address] [place] [address] [facility] [address] [address]  Phone: [phone] Fax: [phone]    Primary Care Provider: No primary care provider on file      Primary Insurance: MEDICARE  Secondary Insurance: [address] [employer]    DISCHARGE DETAILS: [facility] made aware of dc home today  Requested [address]         Is the patient interested in [facility] at discharge?: Yes  [address] requested[de-identified] Medical Social Work, Nursing, Physical [address] Name[de-identified] [facility] Provider[de-identified] PCP  Home Health Services Needed[de-identified] Evaluate Functional Status and Safety  Homebound Criteria Met[de-identified] Uses an Assist Device (i e  cane, walker, etc)  Supporting Clincal Findings[de-identified] Limited Endurance    Patient is written for dc home today and message was sent to [facility]  Sent in basket message to request OP Caremanager  Patient's meds were picked up at [facility] $38 38  Patient was provided LYFT transport to home  CM called Phil Morrison at [facility], [phone], and left message for her to call CM about patient's dc home

## 2022-05-18 NOTE — PROGRESS NOTES
Follow up Consultation    Nephrology   Cintia Duffy 56 y o  male MRN: [id number]  Unit/Bed#: [facility] 508-01 Encounter: [id number]      Physician Requesting Consult: Jacqueline uFnez MD        ASSESSMENT/PLAN:  76-year-old male with multiple comorbidities including hypertension, diabetes, AFib, documented cognitive impairment admitted with palpitations found to have AFib with RVR  Nephrology following for acute kidney injury  Acute kidney injury :  GLO multifactorial most likely secondary to cardiorenal syndrome in light of hemodynamic perturbations in light of AFib with RVR and volume overload  After review of records [facility] as well as Care everywhere it appears that the patient has a baseline Creatinine of 1-1 1 mg/dL  patient was admitted with a creatinine of 1 2 mg/dL on 05/10/2022  Peak creatinine this hospitalization at 3 22 on 05/14/2022  patient's creatinine today is at 1 81 mg/dL, stable and continues to improve  Renal ultrasound with no hydronephrosis  No indication for dialysis  check BMP in a m still in hospital   Await renal recovery  Optimize hemodynamic status to avoid delay in renal recovery  Place on a renal diet when allowed diet order  Avoid nephrotoxins, adjust meds to appropriate GFR  Strict I/O  Daily weights  Urinary retention protocol if patient does not have a Sellers  will need to set up patient for follow up with Nephrology as an outpatient post hospitalization  for nephrology as an outpatient patient follows up with no nephrologist  Stable for discharge from renal standpoint medically cleared  Message sent to the office to arrange for outpatient follow-up in 2-3 weeks  Check BMP on 05/24/2022    Blood pressure/hypertension:  current medications: Torsemide 40 mg p o  Q day  metoprolol 100 mg p o  b i d   recommendations:  Decrease torsemide to 20 mg p o  Q day  Optimize hemodynamics  Maintain MAP > 65mmHg  Avoid BP fluctuations      H/H/anemia:  most recent hemoglobin at 13 2 grams/deciliter  maintain hemoglobin greater than 8 grams/deciliter    Acid-base electrolytes:    Electrolytes:    Hyponatremia:   Most recent sodium at 139 mEq  Remains stable  Continue fluid restriction 1 2 L per day    Hypokalemia:  Likely secondary to diuresis  Most recent potassium 3 4 mEq  Will supplement with additional potassium today  Continue K-Dur 40 mEq p o  Q day upon discharge    Acid-base:    Most recent bicarb at 41 likely secondary to diuresis  Decrease torsemide dosage    Other medical problems:  Proteinuria: Most recent protein creatinine ratio 750 mg  CHF exacerbation:  Management per primary team   Most recent echo with EF of 40%  Decrease torsemide 20 mg p o  Q day      Thanks for the consult  Will continue to follow  Please call with questions/ concerns  Above-mentioned orders and Plan in terms of acute kidney injury was discussed with the team in depth be a Tiger text    Justin Wiley MD, FASN, 2022, 12:10 PM              Objective :   Patient seen and examined in his room no overnight events happy to hear renal parameters are stable improving and he can be discharged today urine output close to 2 9 L last 24 hours  PHYSICAL EXAM  /85   Pulse 100   Temp 97 9 °F (36 6 °C)   Resp 18   Ht 5' 10" (1 778 m)   Wt 105 kg (230 lb 12 8 oz)   SpO2 90%   BMI 33 12 kg/m²   Temp (24hrs), Av 8 °F (36 6 °C), Min:97 3 °F (36 3 °C), Max:98 2 °F (36 8 °C)        Intake/Output Summary (Last 24 hours) at 2022 1210  Last data filed at 2022 0801  Gross per 24 hour   Intake 1046 66 ml   Output 2300 ml   Net -1253 34 ml       I/O last 24 hours: In: 1582 7 [P O :1176; IV Piggyback:406 7]  Out: 3150 [Urine:3150]      Current Weight: Weight - Scale: 105 kg (230 lb 12 8 oz)  First Weight: Weight - Scale: 124 kg (272 lb 14 9 oz)  Physical Exam  Vitals and nursing note reviewed  Constitutional:       General: He is not in acute distress  Appearance: Normal appearance   He is normal weight  He is not ill-appearing, toxic-appearing or diaphoretic  HENT:      Head: Normocephalic and atraumatic  Mouth/Throat:      Mouth: Mucous membranes are moist       Pharynx: Oropharynx is clear  No oropharyngeal exudate  Eyes:      General: No scleral icterus  Conjunctiva/sclera: Conjunctivae normal    Cardiovascular:      Rate and Rhythm: Normal rate  Heart sounds: Normal heart sounds  No murmur heard  No friction rub  Pulmonary:      Effort: Pulmonary effort is normal  No respiratory distress  Breath sounds: Normal breath sounds  No stridor  No wheezing  Abdominal:      General: There is no distension  Palpations: Abdomen is soft  There is no mass  Tenderness: There is no abdominal tenderness  Musculoskeletal:         General: No swelling  Cervical back: Normal range of motion and neck supple  No rigidity  Skin:     General: Skin is warm  Coloration: Skin is not jaundiced  Neurological:      General: No focal deficit present  Mental Status: He is alert and oriented to person, place, and time  Psychiatric:         Mood and Affect: Mood normal          Behavior: Behavior normal              Review of Systems   Constitutional: Negative for appetite change, chills, fatigue and fever  HENT: Negative for congestion  Respiratory: Negative for cough, shortness of breath and wheezing  Cardiovascular: Negative for leg swelling  Gastrointestinal: Negative for abdominal pain, constipation, diarrhea, nausea and vomiting  Genitourinary: Negative for dysuria  Musculoskeletal: Negative for back pain  Neurological: Negative for headaches  Psychiatric/Behavioral: Negative for agitation and confusion  All other systems reviewed and are negative        Scheduled Meds:  Current Facility-Administered Medications   Medication Dose Route Frequency Provider Last Rate    acetaminophen  650 mg Oral Q6H PRN Roseline Geller MD      albuterol  2 puff Inhalation Q4H PRN Verenice Landa MD      apixaban  5 mg Oral BID Kushal Vail MD      calcium carbonate  500 mg Oral Daily PRN Kushal Vail MD      cephalexin  500 mg Oral Q8H [facility] Ei Seth Soriano MD      metoprolol  5 mg Intravenous Q6H PRN Kushal Vail MD      metoprolol tartrate  150 mg Oral Q12H [facility] Ei Seth Soriano MD      potassium chloride  40 mEq Oral Daily Kushal Vail MD      pravastatin  80 mg Oral Daily With Ann-Marie Perez MD      torsemide  20 mg Oral Daily Gladis Serrano MD         PRN Meds:   acetaminophen    albuterol    calcium carbonate    metoprolol    Continuous Infusions:       Invasive Devices: Invasive Devices  Report    Peripheral Intravenous Line  Duration           Peripheral IV 05/14/22 Distal;Right;Ventral (anterior) Forearm 3 days                  LABORATORY:    Results from last 7 days   Lab Units 05/18/22  0455 05/17/22  0135 05/16/22  0532 05/15/22  0605 05/14/22  0242 05/13/22  0509 05/12/22  0449   WBC Thousand/uL  --  8 90 9 35 10 06 12 14* 13 86*  --    HEMOGLOBIN g/dL  --  13 2 13 4 12 7 12 8 12 7  --    HEMATOCRIT %  --  43 9 44 4 42 7 44 1 44 5  --    PLATELETS Thousands/uL  --  118* 143* 151 210 205  --    POTASSIUM mmol/L 3 4* 3 0* 3 1* 3 2* 5 7* 4 6 4 6   CHLORIDE mmol/L 95* 94* 95* 95* 97* 99* 102   CO2 mmol/L 41* 37* 37* 28 27 25 22   BUN mg/dL 34* 35* 43* 45* 43* 35* 28*   CREATININE mg/dL 1 81* 2 32* 2 50* 3 11* 3 22* 3 02* 2 35*   CALCIUM mg/dL 9 2 9 1 9 6 9 2 8 6 8 8 8 7   MAGNESIUM mg/dL  --  2 3 1 8 2 0 2 2  --   --    PHOSPHORUS mg/dL 4 0  --   --   --  4 9*  --   --       rest all reviewed    RADIOLOGY:  XR chest pa & lateral   Final Result by Maxwell Ro MD (05/13 1429)   Mild interstitial pulmonary edema and basilar loculated effusions        Workstation performed: [id number]         US kidney and bladder   Final Result by Jennifer Nj MD (05/13 0754)      Normal              Workstation performed: [id number]         VAS lower limb venous duplex study, complete bilateral   Final Result by Nury Ramos MD (05/10 3262)      XR chest 1 view portable   Final Result by Abi Schaffer MD (05/10 5426)      Borderline cardiomegaly  Mild vascular congestion  Bilateral pleural effusions  Workstation performed: [id number]           Rest all reviewed    Portions of the record may have been created with voice recognition software  Occasional wrong word or "sound a like" substitutions may have occurred due to the inherent limitations of voice recognition software  Read the chart carefully and recognize, using context, where substitutions have occurred  If you have any questions, please contact the dictating provider

## 2022-05-18 NOTE — DISCHARGE INSTR - AVS FIRST PAGE
Please take your medications regularly as prescribed  For your heart rate: take metoprolol 150 mg 2 times a day and Eliquis (blood thinner) 5 mg 2 times a day  For your heart and excess water: take water pill torsemide 20 mg once a day with potassium supplement 20 mg once a day  For diabetes: metformin 500 mg once a day    For cholesterol: Crestor 10 mg once a day  You have an appointment with cardiology on 5/25/2022 at 2:20 pm at MEDICAL/DENTAL FACILITY AT [address]  You have an appointment with your PCP on 6/1/2022 at 2:20 pm at [facility], [address] [place] [address]  Nephrology (kidney doctor) office will contact you for follow up appointment

## 2022-05-18 NOTE — CASE MANAGEMENT
Case Management Progress Note    Patient name Jeanie Dasilva  Location [facility] 508/[facility] 370-25 MRN [id number]  : 1975 Date 2022       LOS (days): 8  Geometric Mean LOS (GMLOS) (days): 3 50  Days to GMLOS:-4 4        OBJECTIVE:        Current admission status: Inpatient  Preferred Pharmacy:   [address], [address] [facility] [address] [place] [address]  Phone: [phone] Fax: [phone]    [id number], [address] [facility], [address] [place] [address]  Phone: [phone] Fax: Enrique Morris 1, [facility] [place] [address] [facility] [address] [place] [address] [facility] 77 [address]  Phone: [phone] Fax: [phone]    Primary Care Provider: Camacho Hampton MD (Inactive)    Primary Insurance: MEDICARE  Secondary Insurance: [id number] NOTE: Discussed patient with [facility] and they are checking cost of Eliquis  ECIN referral made to [facility]

## 2022-05-18 NOTE — CASE MANAGEMENT
Case Management Discharge Planning Note    Patient name Keyana Bonilla  [facility] [address] [facility] [id number]  : 1975 Date 2022       Current Admission Date: 5/10/2022  Current Admission Diagnosis:Atrial fibrillation with rapid ventricular response [facility])   Patient Active Problem List    Diagnosis Date Noted    Hypokalemia 2022    Acute HFrEF (heart failure with reduced ejection fraction) ([facility] ) 2022    GLO (acute kidney injury) ([place] [address] ) 2022    Hyperlipidemia 05/10/2022    Cellulitis of left lower extremity 05/10/2022    Hypertension 2022    Asthma 2022    Atrial fibrillation with rapid ventricular response ([place] [address] ) 2022    Elevated liver enzymes 2022    Elevated d-dimer 2022    Seroma due to trauma ([place] [address] ) 2022    Bilateral pleural effusion 2022    Hiatal hernia 2022    Dyspnea on minimal exertion 2022    Obesity 2022    Cognitive deficits 2022    Cardiomegaly 2022    Diverticulosis of colon without diverticulitis 2022    Type 2 diabetes mellitus, without long-term current use of insulin ([place] [address] ) 2022      LOS (days): 8  Geometric Mean LOS (GMLOS) (days): 3 50  Days to GMLOS:-4 4     OBJECTIVE:  Risk of Unplanned Readmission Score: 15 93         Current admission status: Inpatient   Preferred Pharmacy:    [facility], [place] - [address]  Phone: [phone] Fax: [phone]    [id number] [id number], [address] [facility] [place], [place] [address], [address] [facility] [address]  Phone: [phone] Fax: [address] [facility], [facility] [address] [facility] [address] [address]  Phone: [phone] Fax: [phone]    Primary Care Provider: Kasandra Stewart MD (Inactive)    Primary Insurance: MEDICARE  Secondary Insurance: [address] [employer]ALTH[employer]ICES    DISCHARGE DETAILS:    Other Referral/Resources/Interventions Provided:  Financial Resources Provided: Indigent Transportation  Referral Comments: Pt will need transport at d/c  Per resident pt stable for d/c today   Cm had pt sign lyft waiver

## 2022-05-19 ENCOUNTER — PATIENT OUTREACH (OUTPATIENT)
Dept: CASE MANAGEMENT | Facility: OTHER | Age: 47
End: 2022-05-19

## 2022-05-19 ENCOUNTER — HOME CARE VISIT (OUTPATIENT)
Dept: HOME HEALTH SERVICES | Facility: HOME HEALTHCARE | Age: 47
End: 2022-05-19
Payer: MEDICARE

## 2022-05-19 VITALS
HEIGHT: 71 IN | SYSTOLIC BLOOD PRESSURE: 124 MMHG | HEART RATE: 76 BPM | RESPIRATION RATE: 18 BRPM | WEIGHT: 230 LBS | BODY MASS INDEX: 32.2 KG/M2 | DIASTOLIC BLOOD PRESSURE: 84 MMHG | TEMPERATURE: 97.1 F

## 2022-05-19 PROCEDURE — 400013 VN SOC

## 2022-05-19 PROCEDURE — G0299 HHS/HOSPICE OF RN EA 15 MIN: HCPCS

## 2022-05-19 PROCEDURE — 10330081 VN NO-PAY CLAIM PROCEDURE

## 2022-05-19 NOTE — CASE COMMUNICATION
[facility] has admitted your patient to [address] [place] [address] with the following disciplines:        Carolyn Ada Carolyn Ada No glucophage or crestor in the home  Per [facility] they were end dated however also still on DC summary that states pt should be taking them  Please call to [facility] [place] [facility] if pt should be taking them  Please clarify Fluid Restriction for specific amount    SN 1 wk 6  pt refuses more then weekly visits  pt refuses PT and OT servi sal  Primary focus of home health care afib   Patient stated goals of care to be sure my medications are good   Anticipated visit pattern and next visit date see above next visit 5 24 for blood work per epic   Carolyn Ada renal panel bmp and cbcd  See medication list - BSN

## 2022-05-19 NOTE — PROGRESS NOTES
I called [facility] to verify they received a referral for this patient  I spoke with intake and they do have a referral with a tentative start date for tomorrow  Intravitreal Injections:

## 2022-05-19 NOTE — PROGRESS NOTES
ADT alert received with patient discharge  Patient was admitted to [facility] with Afib with RVR as principal problem  Active problems heart failure, diabetes, hyperlipidemia and cellulitis of the left leg  Patient was discharged 5/18 with [facility] for home health, medical social work OT and physical therapy  Patient has an appointment at [facility] to establish care on 6/1  I called David Pope at [facility] and left a message on her voicemail with my contact information requesting a return call  The only phone number in demographics is patients fathers

## 2022-05-19 NOTE — TELEPHONE ENCOUNTER
I spoke to the patients father and he advised that he will await to speak to the patient himself   given he does not drive and will call us back to schedule after

## 2022-05-20 ENCOUNTER — PATIENT OUTREACH (OUTPATIENT)
Dept: CASE MANAGEMENT | Facility: OTHER | Age: 47
End: 2022-05-20

## 2022-05-20 NOTE — PROGRESS NOTES
I called patient's father and advised him of the appointments for Kierra  He is scheduled to see cardiology 5/25 and establish care at [facility] on 6/1  I reviewed the note of the skilled nurse on 5/49 with [facility]

## 2022-05-24 ENCOUNTER — LAB REQUISITION (OUTPATIENT)
Dept: LAB | Facility: HOSPITAL | Age: 47
End: 2022-05-24
Payer: MEDICARE

## 2022-05-24 ENCOUNTER — HOME CARE VISIT (OUTPATIENT)
Dept: HOME HEALTH SERVICES | Facility: HOME HEALTHCARE | Age: 47
End: 2022-05-24
Payer: MEDICARE

## 2022-05-24 VITALS
DIASTOLIC BLOOD PRESSURE: 80 MMHG | RESPIRATION RATE: 16 BRPM | SYSTOLIC BLOOD PRESSURE: 118 MMHG | OXYGEN SATURATION: 99 % | TEMPERATURE: 97.9 F | HEART RATE: 92 BPM

## 2022-05-24 DIAGNOSIS — R00.2 PALPITATIONS: ICD-10-CM

## 2022-05-24 LAB
ALBUMIN SERPL BCP-MCNC: 3.4 G/DL (ref 3.5–5)
ANION GAP SERPL CALCULATED.3IONS-SCNC: 6 MMOL/L (ref 4–13)
ANION GAP SERPL CALCULATED.3IONS-SCNC: 7 MMOL/L (ref 4–13)
BASOPHILS # BLD AUTO: 0.06 THOUSANDS/ΜL (ref 0–0.1)
BASOPHILS NFR BLD AUTO: 1 % (ref 0–1)
BUN SERPL-MCNC: 16 MG/DL (ref 5–25)
BUN SERPL-MCNC: 16 MG/DL (ref 5–25)
CALCIUM ALBUM COR SERPL-MCNC: 9.8 MG/DL (ref 8.3–10.1)
CALCIUM SERPL-MCNC: 9.3 MG/DL (ref 8.3–10.1)
CALCIUM SERPL-MCNC: 9.4 MG/DL (ref 8.3–10.1)
CHLORIDE SERPL-SCNC: 96 MMOL/L (ref 100–108)
CHLORIDE SERPL-SCNC: 97 MMOL/L (ref 100–108)
CO2 SERPL-SCNC: 32 MMOL/L (ref 21–32)
CO2 SERPL-SCNC: 33 MMOL/L (ref 21–32)
CREAT SERPL-MCNC: 1.36 MG/DL (ref 0.6–1.3)
CREAT SERPL-MCNC: 1.41 MG/DL (ref 0.6–1.3)
EOSINOPHIL # BLD AUTO: 0.35 THOUSAND/ΜL (ref 0–0.61)
EOSINOPHIL NFR BLD AUTO: 4 % (ref 0–6)
ERYTHROCYTE [DISTWIDTH] IN BLOOD BY AUTOMATED COUNT: 19.3 % (ref 11.6–15.1)
GFR SERPL CREATININE-BSD FRML MDRD: 59 ML/MIN/1.73SQ M
GFR SERPL CREATININE-BSD FRML MDRD: 61 ML/MIN/1.73SQ M
GLUCOSE SERPL-MCNC: 104 MG/DL (ref 65–140)
GLUCOSE SERPL-MCNC: 107 MG/DL (ref 65–140)
HCT VFR BLD AUTO: 42.6 % (ref 36.5–49.3)
HGB BLD-MCNC: 12.5 G/DL (ref 12–17)
IMM GRANULOCYTES # BLD AUTO: 0.05 THOUSAND/UL (ref 0–0.2)
IMM GRANULOCYTES NFR BLD AUTO: 1 % (ref 0–2)
LYMPHOCYTES # BLD AUTO: 1.17 THOUSANDS/ΜL (ref 0.6–4.47)
LYMPHOCYTES NFR BLD AUTO: 14 % (ref 14–44)
MCH RBC QN AUTO: 23.3 PG (ref 26.8–34.3)
MCHC RBC AUTO-ENTMCNC: 29.3 G/DL (ref 31.4–37.4)
MCV RBC AUTO: 80 FL (ref 82–98)
MONOCYTES # BLD AUTO: 1 THOUSAND/ΜL (ref 0.17–1.22)
MONOCYTES NFR BLD AUTO: 12 % (ref 4–12)
NEUTROPHILS # BLD AUTO: 5.67 THOUSANDS/ΜL (ref 1.85–7.62)
NEUTS SEG NFR BLD AUTO: 68 % (ref 43–75)
NRBC BLD AUTO-RTO: 0 /100 WBCS
PHOSPHATE SERPL-MCNC: 2.3 MG/DL (ref 2.7–4.5)
PLATELET # BLD AUTO: 201 THOUSANDS/UL (ref 149–390)
PMV BLD AUTO: 11.2 FL (ref 8.9–12.7)
POTASSIUM SERPL-SCNC: 3.9 MMOL/L (ref 3.5–5.3)
POTASSIUM SERPL-SCNC: 4.3 MMOL/L (ref 3.5–5.3)
RBC # BLD AUTO: 5.36 MILLION/UL (ref 3.88–5.62)
SODIUM SERPL-SCNC: 135 MMOL/L (ref 136–145)
SODIUM SERPL-SCNC: 136 MMOL/L (ref 136–145)
WBC # BLD AUTO: 8.3 THOUSAND/UL (ref 4.31–10.16)

## 2022-05-24 PROCEDURE — 80069 RENAL FUNCTION PANEL: CPT | Performed by: INTERNAL MEDICINE

## 2022-05-24 PROCEDURE — 85025 COMPLETE CBC W/AUTO DIFF WBC: CPT | Performed by: INTERNAL MEDICINE

## 2022-05-24 PROCEDURE — G0300 HHS/HOSPICE OF LPN EA 15 MIN: HCPCS

## 2022-05-25 ENCOUNTER — OFFICE VISIT (OUTPATIENT)
Dept: CARDIOLOGY CLINIC | Facility: CLINIC | Age: 47
End: 2022-05-25
Payer: MEDICARE

## 2022-05-25 ENCOUNTER — PATIENT OUTREACH (OUTPATIENT)
Dept: CASE MANAGEMENT | Facility: OTHER | Age: 47
End: 2022-05-25

## 2022-05-25 ENCOUNTER — EPISODE CHANGES (OUTPATIENT)
Dept: CASE MANAGEMENT | Facility: OTHER | Age: 47
End: 2022-05-25

## 2022-05-25 VITALS
HEIGHT: 70 IN | DIASTOLIC BLOOD PRESSURE: 82 MMHG | HEART RATE: 118 BPM | SYSTOLIC BLOOD PRESSURE: 120 MMHG | WEIGHT: 235.1 LBS | BODY MASS INDEX: 33.66 KG/M2

## 2022-05-25 DIAGNOSIS — E11.00 TYPE 2 DIABETES MELLITUS WITH HYPEROSMOLARITY WITHOUT COMA, WITHOUT LONG-TERM CURRENT USE OF INSULIN (HCC): ICD-10-CM

## 2022-05-25 DIAGNOSIS — I42.0 DILATED CARDIOMYOPATHY (HCC): ICD-10-CM

## 2022-05-25 DIAGNOSIS — N18.2 CKD (CHRONIC KIDNEY DISEASE), STAGE II: ICD-10-CM

## 2022-05-25 DIAGNOSIS — I48.92 ATRIAL FLUTTER, UNSPECIFIED TYPE (HCC): Primary | ICD-10-CM

## 2022-05-25 DIAGNOSIS — I50.22 CHRONIC HFREF (HEART FAILURE WITH REDUCED EJECTION FRACTION) (HCC): ICD-10-CM

## 2022-05-25 PROCEDURE — 99215 OFFICE O/P EST HI 40 MIN: CPT | Performed by: NURSE PRACTITIONER

## 2022-05-25 PROCEDURE — 93000 ELECTROCARDIOGRAM COMPLETE: CPT | Performed by: NURSE PRACTITIONER

## 2022-05-25 NOTE — PROGRESS NOTES
Cardiology Follow Up    [place]  1975  [id number]  [facility] [address]  [phone]    1  Atrial flutter, unspecified type (HCC)  metoprolol tartrate (LOPRESSOR) 25 mg tablet    Cardioversion    POCT ECG    POCT ECG   2  Chronic HFrEF (heart failure with reduced ejection fraction) (HCC)  POCT ECG   3  Dilated cardiomyopathy ([address] )     4  CKD (chronic kidney disease), stage II     5  Type 2 diabetes mellitus with hyperosmolarity without coma, without long-term current use of insulin ([facility])         Interval History:   Mr Do Has was admitted to [facility] on 5/10 - 5/18/22 with atrial fibrillation with  BPM   He presented to the ED with a one day history of palpitations  He had been  Compliant taking metoprolol tartrate and Eliquis for the past month  He reported worsening shortness of breath and was subsequently taken to the emergency room by his father  He presented with lower extremity edema  Chest x-ray showed mild vascular congestion  Cardiology was consulted  GLO on admission felt to be cardiorenal   renal function improved with diuresis  5/10/22 TTE: LV  Not well visualized even with the use of contrast   Left ventricular cavity size normal wall thickness normal left ventricular ejection fraction estimated be 40%  Systolic function mildly reduced  Unable to asses diastolic function due to atrial fibrillation  Left atrium dilated  Mild pulmonic valve regurgitation  IV lasix 80mg BID and transitioned to Torsemide  He was treated with IV metoprolol for rate controlled  He was placed on metoprolol tartrate 150 mg b i d   Eliquis 5 mg b i d  started  Discharge weight 267 lb    Kierra presents to our office for a recent hospitalization follow up visit  His weight is  235 pounds at home and in the office    According to Kierra he is eating a low sodium diet  He denies CP, palpitations, lightheadedness or dizziness  VNA every Monday    According to Kierra this past Monday his HR was 90 BPM      5/24/22  35 potassium 0 3 BUN and 69 creatinine 0 3      Medical History   Paroxysmal Atrial Fibrillation [id number]= 2 ( HTN, DM)   Chronic HFrEF LVEF 40%   CM LVEF 40% most likely due to AF with RVR  Hyperlipidemia  CKD III baseline creat 1 2   DM2 HgbA1C 6 7 on 2/25/22  Cognitive deficit resides in a group home [facility]      Patient Active Problem List   Diagnosis    Hypertension    Asthma    Atrial fibrillation with rapid ventricular response (HCC)    Elevated liver enzymes    Elevated d-dimer    Seroma due to trauma (HCC)    Bilateral pleural effusion    Hiatal hernia    Dyspnea on minimal exertion    Obesity    Cognitive deficits    Cardiomegaly    Diverticulosis of colon without diverticulitis    Type 2 diabetes mellitus, without long-term current use of insulin (HCC)    Hyperlipidemia    Cellulitis of left lower extremity    GLO (acute kidney injury) ([facility] )    Hypokalemia    Acute HFrEF (heart failure with reduced ejection fraction) ([facility] )     Past Medical History:   Diagnosis Date    A-fib ([facility] )     Hypertension     Hyponatremia 5/12/2022     Social History     Socioeconomic History    Marital status: Single     Spouse name: Not on file    Number of children: Not on file    Years of education: Not on file    Highest education level: Not on file   Occupational History    Not on file   Tobacco Use    Smoking status: Never Smoker    Smokeless tobacco: Never Used   Substance and Sexual Activity    Alcohol use: Never    Drug use: Not Currently    Sexual activity: Not on file   Other Topics Concern    Not on file   Social History Narrative    Not on file     Social Determinants of Health     Financial Resource Strain: Not on file   Food Insecurity: No Food Insecurity    Worried About Running Out of Food in the Last Year: Never true   Mirtha Rivas Ran Out of Food in the Last Year: Never true   Transportation Needs: No Transportation Needs    Lack of Transportation (Medical): No    Lack of Transportation (Non-Medical): No   Physical Activity: Not on file   Stress: Not on file   Social Connections: Not on file   Intimate Partner Violence: Not on file   Housing Stability: Low Risk     Unable to Pay for Housing in the Last Year: No    Number of Places Lived in the Last Year: 1    Unstable Housing in the Last Year: No      No family history on file  No past surgical history on file      Current Outpatient Medications:     albuterol (PROVENTIL HFA,VENTOLIN HFA) 90 mcg/act inhaler, Inhale 2 puffs every 4 (four) hours as needed for wheezing or shortness of breath, Disp: 8 g, Rfl: 0    apixaban (Eliquis) 5 mg, Take 1 tablet (5 mg total) by mouth in the morning and 1 tablet (5 mg total) in the evening , Disp: 60 tablet, Rfl: 1    metFORMIN (GLUCOPHAGE-XR) 500 mg 24 hr tablet, Take 1 tablet (500 mg total) by mouth daily with dinner, Disp: 30 tablet, Rfl: 0    metoprolol tartrate 75 MG TABS, Take 2 tablets (150 mg total) by mouth every 12 (twelve) hours, Disp: 120 tablet, Rfl: 1    potassium chloride (K-DUR,KLOR-CON) 20 mEq tablet, Take 1 tablet (20 mEq total) by mouth in the morning , Disp: 30 tablet, Rfl: 0    rosuvastatin (CRESTOR) 10 MG tablet, Take 1 tablet (10 mg total) by mouth daily, Disp: 30 tablet, Rfl: 0    torsemide (DEMADEX) 20 mg tablet, Take 1 tablet (20 mg total) by mouth in the morning , Disp: 30 tablet, Rfl: 1  Allergies   Allergen Reactions    Penicillins Other (See Comments)     unknown       Labs:  Lab Requisition on 05/24/2022   Component Date Value    Sodium 05/24/2022 135 (A)    Potassium 05/24/2022 3 9     Chloride 05/24/2022 97 (A)    CO2 05/24/2022 32     ANION GAP 05/24/2022 6     BUN 05/24/2022 16     Creatinine 05/24/2022 1 36 (A)    Glucose 05/24/2022 107     Calcium 05/24/2022 9 4     eGFR 05/24/2022 61     WBC 05/24/2022 8 30     RBC 05/24/2022 5 36     Hemoglobin 05/24/2022 12 5     Hematocrit 05/24/2022 42 6     MCV 05/24/2022 80 (A)    MCH 05/24/2022 23 3 (A)    MCHC 05/24/2022 29 3 (A)    RDW 05/24/2022 19 3 (A)    MPV 05/24/2022 11 2     Platelets 19/56/0223 201     nRBC 05/24/2022 0     Neutrophils Relative 05/24/2022 68     Immat GRANS % 05/24/2022 1     Lymphocytes Relative 05/24/2022 14     Monocytes Relative 05/24/2022 12     Eosinophils Relative 05/24/2022 4     Basophils Relative 05/24/2022 1     Neutrophils Absolute 05/24/2022 5 67     Immature Grans Absolute 05/24/2022 0 05     Lymphocytes Absolute 05/24/2022 1 17     Monocytes Absolute 05/24/2022 1 00     Eosinophils Absolute 05/24/2022 0 35     Basophils Absolute 05/24/2022 0 06     Albumin 05/24/2022 3 4 (A)    Calcium 05/24/2022 9 3     Corrected Calcium 05/24/2022 9 8     Phosphorus 05/24/2022 2 3 (A)    Glucose 05/24/2022 104     BUN 05/24/2022 16     Creatinine 05/24/2022 1 41 (A)    Sodium 05/24/2022 136     Potassium 05/24/2022 4 3     Chloride 05/24/2022 96 (A)    CO2 05/24/2022 33 (A)    ANION GAP 05/24/2022 7     eGFR 05/24/2022 59    No results displayed because visit has over 200 results  Imaging: XR chest 1 view portable    Result Date: 5/10/2022  Narrative: CHEST INDICATION:   afib rvr  COMPARISON:  None EXAM PERFORMED/VIEWS:  XR CHEST PORTABLE FINDINGS: Cardiomediastinal silhouette appears borderline enlarged with mild vascular congestion and bilateral pleural effusions  No pneumothorax Osseous structures appear within normal limits for patient age  Impression: Borderline cardiomegaly  Mild vascular congestion  Bilateral pleural effusions  Workstation performed: [id number]     XR chest pa & lateral    Result Date: 5/13/2022  Narrative: CHEST INDICATION:   worsening hypoxia   COMPARISON:  5/10/2022 EXAM PERFORMED/VIEWS:  XR CHEST PA & LATERAL FINDINGS: Cardiomediastinal silhouette appears unremarkable  Small pleural effusions appear partially loculated laterally  Mild interstitial pulmonary edema demonstrated  Osseous structures appear within normal limits for patient age  Impression: Mild interstitial pulmonary edema and basilar loculated effusions  Workstation performed: [id number]     US kidney and bladder    Result Date: 5/13/2022  Narrative: RENAL ULTRASOUND INDICATION:   GLO  COMPARISON: 2/24/2022 TECHNIQUE:   Ultrasound of the retroperitoneum was performed with a curvilinear transducer utilizing volumetric sweeps and still imaging techniques  FINDINGS: KIDNEYS: Symmetric and normal size  Right kidney:  10 9 x 5 4 x 5 7 cm  Volume 174 3 mL Left kidney:  10 7 x 5 3 x 5 5 cm  Volume 164 8 mL Right kidney Normal echogenicity and contour  No mass is identified  No hydronephrosis  No shadowing calculi  No perinephric fluid collections  Left kidney Normal echogenicity and contour  No mass is identified  No hydronephrosis  No shadowing calculi  No perinephric fluid collections  URETERS: Nonvisualized  BLADDER: Normally distended  No focal thickening or mass lesions  Bilateral ureteral jets detected  Impression: Normal  Workstation performed: [id number]     VAS lower limb venous duplex study, complete bilateral    Result Date: 5/10/2022  Narrative:  [facility] REPORT CLINICAL: Indications: Patient presents with bilateral lower extremity edema x 3 days  Operative History: No prior cardiovascular surgeries Risk Factors The patient has history of HTN and Hyperlipidemia  He has no history of DVT  CONCLUSION:  Impression: RIGHT LOWER LIMB: No evidence of acute or chronic deep vein thrombosis  No evidence of superficial thrombophlebitis noted  Doppler evaluation shows a normal response to augmentation maneuvers  Popliteal arterial Doppler waveform is triphasic  Posterior tibial and anterior tibial arterial Doppler waveforms are biphasic    LEFT LOWER LIMB: No evidence of acute or chronic deep vein thrombosis  No evidence of superficial thrombophlebitis noted  Doppler evaluation shows a normal response to augmentation maneuvers  Popliteal, posterior tibial and anterior tibial arterial Doppler waveforms are triphasic  SIGNATURE: Electronically Signed by: Megha Obrien MD on 2022-05-10 06:42:44 PM    Echo follow up/limited w/ contrast if indicated    Result Date: 5/11/2022  Narrative: [place] Area: Left ventricle is not well visualized even with the use of contrast  Left ventricular cavity size is normal  Wall thickness is normal  The left ventricular ejection fraction is estimated at 40%  Systolic function is mildly reduced  There is significant beat to beat variability with afib  Wall motion cannot be accurately assessed  Unable to assess diastolic function due to atrial fibrillation    Left Atrium: The atrium is dilated    Pulmonic Valve: There is mild regurgitation  Very technically difficult study, even with the use of Definity contrast  LVEF was very variable with R-R interval with afib and tachycardia  Overall LVEF estimated at 40% visually  Review of Systems:  Review of Systems   Psychiatric/Behavioral: The patient is nervous/anxious  All other systems reviewed and are negative  Physical Exam:  Physical Exam  Vitals reviewed  Constitutional:       Appearance: He is obese  HENT:      Head: Normocephalic  Neck:      Comments: No JVD  Cardiovascular:      Rate and Rhythm: Tachycardia present  Rhythm irregular  Pulses: Normal pulses  Heart sounds: Normal heart sounds  Pulmonary:      Effort: Pulmonary effort is normal       Breath sounds: Normal breath sounds  Abdominal:      General: Bowel sounds are normal       Palpations: Abdomen is soft  Musculoskeletal:         General: Normal range of motion  Cervical back: Normal range of motion and neck supple  Comments: Trace yesica LE edema    Skin:     General: Skin is warm        Capillary Refill: Capillary refill takes less than 2 seconds  Neurological:      General: No focal deficit present  Mental Status: He is alert and oriented to person, place, and time  Psychiatric:         Mood and Affect: Mood normal          Behavior: Behavior normal          Discussion/Summary:  1  Atrial flutter with  BPM, [id number]= 2 ( HTN, DM) asymptomatic, reported HR 90 BPM this past Monday  Increase Metoprolol Tartrate from 150mg Q12 hours to Metoprolol Tartrate 150mg in am and 175mg in pm   Kierra denies missing Eliquis 5mg BID  Plan DCCV in the middle of June to ensure 30 days of continued Eliquis compliance  I have instructed Kierra to have VNA call our office for continued HR>110 BPM or SBP< 100  I have discussed  referral to EP given LVEF  Kierra is refusing at this time  2  Chronic HFrEF LVEF 40% NYHA class III stage C- weight 235 pounds,   BPM, /80, metoprolol tartrate from 150 mg q 12 hours 2 additional 25 mg metoprolol tartrate at bedtime equal 175 mg continue on K-Dur 20 mEq daily, torsemide 20 mg daily 2 g sodium diet and daily weight  3  NICM LVEF 40% secondary to AF with RVR  Increase metoprolol tartrate continue to monitor heart rate plan for DCCV in the near future  4  Dyslipidemia 5/14/22 TC 89, TC 99, HDL 17, LDL 52 -  Continue Crestor 10 mg daily heart healthy  5  CKD II baseline creat 1 2 GFR 59   6   DM2 HgbA1C 6 7 on 2/25/22  Continue metformin 100 mg daily

## 2022-05-25 NOTE — PROGRESS NOTES
BPCI episode closed  I updated the care coordination note  I did not remove myself from the care team since patient has not yet established care at [facility]  His appointment is June 1

## 2022-05-25 NOTE — PATIENT INSTRUCTIONS
Maintain a 2 gram daily sodium diet and 1500 ml daily fluid restriction  Check daily weights  If you gain 3 pounds in one day, 5 pounds in one week, or experience worsening shortness of breath or increasing lower leg swelling  Please call the heart failure office at [phone]  Please bring a  list of your current medications and daily weights to the office visit     Take Metoprolol tartrate 175mg at night  Continue on Metoprolol tartrate 150mg in am  Cardioversion on one month       Have visiting nurse call our office for SBP Less than 100 or HR > 110

## 2022-05-26 ENCOUNTER — HOME CARE VISIT (OUTPATIENT)
Dept: HOME HEALTH SERVICES | Facility: HOME HEALTHCARE | Age: 47
End: 2022-05-26
Payer: MEDICARE

## 2022-05-26 ENCOUNTER — TELEPHONE (OUTPATIENT)
Dept: INTERNAL MEDICINE CLINIC | Facility: CLINIC | Age: 47
End: 2022-05-26

## 2022-05-26 NOTE — PROGRESS NOTES
I sent a tiger text to [facility] nurse to ask when the  would see this patient  For some reason that part of the order "dropped" and she was contacting IT for assistance  I also asked staff to remind Kierra of his appointment June 1 to establish with a PCP at [facility]  Staff are notifying patient's father when his appointments with them are scheduled since patient does not have a phone

## 2022-05-27 ENCOUNTER — HOME CARE VISIT (OUTPATIENT)
Dept: HOME HEALTH SERVICES | Facility: HOME HEALTHCARE | Age: 47
End: 2022-05-27
Payer: MEDICARE

## 2022-05-31 ENCOUNTER — HOME CARE VISIT (OUTPATIENT)
Dept: HOME HEALTH SERVICES | Facility: HOME HEALTHCARE | Age: 47
End: 2022-05-31
Payer: MEDICARE

## 2022-05-31 VITALS
WEIGHT: 230 LBS | OXYGEN SATURATION: 98 % | RESPIRATION RATE: 16 BRPM | SYSTOLIC BLOOD PRESSURE: 120 MMHG | DIASTOLIC BLOOD PRESSURE: 60 MMHG | HEART RATE: 84 BPM | TEMPERATURE: 97.9 F | BODY MASS INDEX: 33 KG/M2

## 2022-05-31 PROCEDURE — G0300 HHS/HOSPICE OF LPN EA 15 MIN: HCPCS

## 2022-06-03 ENCOUNTER — PATIENT OUTREACH (OUTPATIENT)
Dept: CASE MANAGEMENT | Facility: OTHER | Age: 47
End: 2022-06-03

## 2022-06-03 ENCOUNTER — HOME CARE VISIT (OUTPATIENT)
Dept: HOME HEALTH SERVICES | Facility: HOME HEALTHCARE | Age: 47
End: 2022-06-03
Payer: MEDICARE

## 2022-06-03 VITALS
BODY MASS INDEX: 33 KG/M2 | OXYGEN SATURATION: 98 % | RESPIRATION RATE: 14 BRPM | WEIGHT: 230 LBS | TEMPERATURE: 97.6 F | DIASTOLIC BLOOD PRESSURE: 78 MMHG | HEART RATE: 99 BPM | SYSTOLIC BLOOD PRESSURE: 122 MMHG

## 2022-06-03 PROCEDURE — G0300 HHS/HOSPICE OF LPN EA 15 MIN: HCPCS

## 2022-06-03 NOTE — PROGRESS NOTES
Patient has an appointment with [facility] nurse  I tiger text to send him a message asking him to assist this patient rescheduling his PCP appointment at [facility]  Patient has cognitive deficits and no phone  Vivienne Ivey LPN stated he will assist Kierra

## 2022-06-07 ENCOUNTER — HOME CARE VISIT (OUTPATIENT)
Dept: HOME HEALTH SERVICES | Facility: HOME HEALTHCARE | Age: 47
End: 2022-06-07
Payer: MEDICARE

## 2022-06-07 VITALS
WEIGHT: 230 LBS | OXYGEN SATURATION: 96 % | BODY MASS INDEX: 33 KG/M2 | TEMPERATURE: 97.2 F | DIASTOLIC BLOOD PRESSURE: 88 MMHG | HEART RATE: 92 BPM | SYSTOLIC BLOOD PRESSURE: 124 MMHG

## 2022-06-07 PROCEDURE — G0299 HHS/HOSPICE OF RN EA 15 MIN: HCPCS

## 2022-06-08 ENCOUNTER — PATIENT OUTREACH (OUTPATIENT)
Dept: FAMILY MEDICINE CLINIC | Facility: CLINIC | Age: 47
End: 2022-06-08

## 2022-06-08 DIAGNOSIS — Z74.8 ASSISTANCE NEEDED WITH TRANSPORTATION: Primary | ICD-10-CM

## 2022-06-08 NOTE — LETTER
Date of evaluation 06/08/22          Gearld Sicard    Date of Birth 1975    Full address for Priti Harley [facility] [address], [address] [facility]     Full address for Drop off [facility] [place], [place]     Patient phone number [phone]([facility]) [phone] (patient)    Patient email [email or web address]  Jorge to Charge [phone]    1  Patient is an established patient, which is defined as any person who has selected and initiated contact to schedule an appointment or who has previously attended an appointment with the [facility] provider to or form which the patient is being transported  YES     2  Patient has no regular, reliable and appropriate means of transportation, either public or private (such as bus, family members, or friends) and does not have insurance that will pay for transportation services  YES    3  Is the transportation being provided solely between the site where medically necessary care is to be provided and the patients residence? Transportation may not be provided for purposes of going to a food bank, applying for government benefits, shopping, or other non-medical purposes  YES    4  Is the destination within 25 miles of the [facility] provider to or from which the patient is being transported, or within 50 miles if the patient resides in a rural area as defined in the [place] [employer] [place]? Mileage is measured as the crow flies (i e , the distance may be longer when driven)  YES    5  Patient is physically able to safely board a Lyft vehicle unassisted from curb to curb or has an adult  physically able to assist? Yes    6  Patient or patients designee is capable of scheduling return trips for [employer] via text message as needed? YES    7  Patient understands that they need to be ready at least 1 hour prior to the appointment time for     The patient also understands that they can wait up to 30 minutes after their sthomasappointment is complete for a ride home  YES    8  Patient has signed the Waiver and Release for [employer] Program? YES    9  For patients under 25years of age, is the patient accompanied by an adult who is responsible for the patient and has the patients parent or legal guardian signed the Waiver and Release for [employer] Program? n/a     10  If patient is a minor requiring a car seat, the patients parent or legal guardian understands they will need to supply a car seat for transportation  n/a  ** In addition to this Patient Qualifier Tool, all transportation must be provided in accordance with the [address]238)  ** In the event that [facility] chooses to provide transportation between a patients residence and other providers or suppliers of medical necessary care, [facility] may not limit the provisions of free transportation to providers or suppliers who are affiliated with [facility]  ** Inability to meet any criteria listed above will result in a denial of Lyft services pending review by the [facility] ETS  or [address] [phone]     Name of evaluator MIMA Collier  Job Title: Social Work Care Manager

## 2022-06-08 NOTE — PROGRESS NOTES
MIMA OLIVEROS received approval from  to set up [place] for patient appointment on 6/15 at 3:00pm  MIMA OLIVEROS called [employer] and set up transportation  Lyft ride is set up for a 1:45pm  time for 2:45pm arrival time for appointment and will contact patient at [phone] when they arrive for   Qualifier tool was also completed  MIMA OLIVEROS contacted patient's father, Mecca Desai, to inform him that [facility] ride was established  Patient's father will inform patient of this confirmed Lyft ride and will explain the process to him  MIMA OLIVEROS will continue to be available to provide support

## 2022-06-08 NOTE — PROGRESS NOTES
MIMA OLIVEROS received referral from visiting nurses to assist patient with transportation to appointment scheduled on 6/15/22 at [place] [address] for initial appointment to establish PCP  MIMA OLIVEROS contacted patient's father, Yemi oClmenares, about same  Patient's father, Yemi Colmenares, reported that patient is no longer taking the public bus due to health conditions and inability to walk far distances  He is currently residing at  in [place]  Patient's father reports that family lives one hour or longer away from patient and are not available to assist with appointments  MIMA OLIVEROS explained [employer] shared ride program to patient's father  MIMA OLIVEROS expressed that a [employer] under 65 disability application will need to be completed and an evaluation will need to be completed for patient  Patient's father noted that a copy of this application can be mailed to him as well as patient to assist him with the application process  Patient's father address is as follows, [address] [place] [address]  MIMA OLIVEROS will mail applications  MIMA OLIVEROS also informed patient's father that Lyft services are typically a one time service provided by [place] [address]  MIMA OLIVEROS explained that approval will be sought from  for [employer] to be utilized for 6/15/22 appointment until Kayla Chung process is completed  MIMA OLIVEROS will follow up with patient's father later on today to discuss Lyft process  MIMA OLIVEROS will continue to be available to provide support

## 2022-06-13 ENCOUNTER — TELEPHONE (OUTPATIENT)
Dept: NEPHROLOGY | Facility: CLINIC | Age: 47
End: 2022-06-13

## 2022-06-13 NOTE — TELEPHONE ENCOUNTER
Spoke to patient's father, Michael Godoman, who handles scheduling for his son  He opted out of scheduling at this time and agreed to closing the referral  He will call back if him and the patient change their minds   Closing referral

## 2022-06-14 ENCOUNTER — HOME CARE VISIT (OUTPATIENT)
Dept: HOME HEALTH SERVICES | Facility: HOME HEALTHCARE | Age: 47
End: 2022-06-14
Payer: MEDICARE

## 2022-06-14 ENCOUNTER — LAB REQUISITION (OUTPATIENT)
Dept: LAB | Facility: HOSPITAL | Age: 47
End: 2022-06-14
Payer: MEDICARE

## 2022-06-14 VITALS — TEMPERATURE: 95.8 F | DIASTOLIC BLOOD PRESSURE: 88 MMHG | SYSTOLIC BLOOD PRESSURE: 126 MMHG | HEART RATE: 88 BPM

## 2022-06-14 DIAGNOSIS — I50.21 ACUTE SYSTOLIC (CONGESTIVE) HEART FAILURE (HCC): ICD-10-CM

## 2022-06-14 DIAGNOSIS — E87.6 HYPOKALEMIA: ICD-10-CM

## 2022-06-14 DIAGNOSIS — I42.9 CARDIOMYOPATHY, UNSPECIFIED (HCC): ICD-10-CM

## 2022-06-14 LAB
ALBUMIN SERPL BCP-MCNC: 3.5 G/DL (ref 3.5–5)
ANION GAP SERPL CALCULATED.3IONS-SCNC: 8 MMOL/L (ref 4–13)
BASOPHILS # BLD AUTO: 0.06 THOUSANDS/ΜL (ref 0–0.1)
BASOPHILS NFR BLD AUTO: 1 % (ref 0–1)
BUN SERPL-MCNC: 12 MG/DL (ref 5–25)
CALCIUM SERPL-MCNC: 9.4 MG/DL (ref 8.3–10.1)
CHLORIDE SERPL-SCNC: 99 MMOL/L (ref 100–108)
CO2 SERPL-SCNC: 29 MMOL/L (ref 21–32)
CREAT SERPL-MCNC: 1.2 MG/DL (ref 0.6–1.3)
EOSINOPHIL # BLD AUTO: 0.52 THOUSAND/ΜL (ref 0–0.61)
EOSINOPHIL NFR BLD AUTO: 7 % (ref 0–6)
ERYTHROCYTE [DISTWIDTH] IN BLOOD BY AUTOMATED COUNT: 21.2 % (ref 11.6–15.1)
GFR SERPL CREATININE-BSD FRML MDRD: 72 ML/MIN/1.73SQ M
GLUCOSE SERPL-MCNC: 120 MG/DL (ref 65–140)
HCT VFR BLD AUTO: 42.5 % (ref 36.5–49.3)
HGB BLD-MCNC: 13.2 G/DL (ref 12–17)
IMM GRANULOCYTES # BLD AUTO: 0.04 THOUSAND/UL (ref 0–0.2)
IMM GRANULOCYTES NFR BLD AUTO: 1 % (ref 0–2)
LYMPHOCYTES # BLD AUTO: 1.32 THOUSANDS/ΜL (ref 0.6–4.47)
LYMPHOCYTES NFR BLD AUTO: 18 % (ref 14–44)
MCH RBC QN AUTO: 24.1 PG (ref 26.8–34.3)
MCHC RBC AUTO-ENTMCNC: 31.1 G/DL (ref 31.4–37.4)
MCV RBC AUTO: 78 FL (ref 82–98)
MONOCYTES # BLD AUTO: 0.85 THOUSAND/ΜL (ref 0.17–1.22)
MONOCYTES NFR BLD AUTO: 12 % (ref 4–12)
NEUTROPHILS # BLD AUTO: 4.62 THOUSANDS/ΜL (ref 1.85–7.62)
NEUTS SEG NFR BLD AUTO: 61 % (ref 43–75)
NRBC BLD AUTO-RTO: 0 /100 WBCS
PHOSPHATE SERPL-MCNC: 2.7 MG/DL (ref 2.7–4.5)
PLATELET # BLD AUTO: 187 THOUSANDS/UL (ref 149–390)
PMV BLD AUTO: 11.4 FL (ref 8.9–12.7)
POTASSIUM SERPL-SCNC: 4 MMOL/L (ref 3.5–5.3)
RBC # BLD AUTO: 5.47 MILLION/UL (ref 3.88–5.62)
SODIUM SERPL-SCNC: 136 MMOL/L (ref 136–145)
WBC # BLD AUTO: 7.41 THOUSAND/UL (ref 4.31–10.16)

## 2022-06-14 PROCEDURE — 85025 COMPLETE CBC W/AUTO DIFF WBC: CPT | Performed by: FAMILY MEDICINE

## 2022-06-14 PROCEDURE — G0299 HHS/HOSPICE OF RN EA 15 MIN: HCPCS

## 2022-06-14 PROCEDURE — 80069 RENAL FUNCTION PANEL: CPT | Performed by: FAMILY MEDICINE

## 2022-06-15 ENCOUNTER — PATIENT OUTREACH (OUTPATIENT)
Dept: FAMILY MEDICINE CLINIC | Facility: CLINIC | Age: 47
End: 2022-06-15

## 2022-06-15 ENCOUNTER — OFFICE VISIT (OUTPATIENT)
Dept: FAMILY MEDICINE CLINIC | Facility: CLINIC | Age: 47
End: 2022-06-15

## 2022-06-15 VITALS
RESPIRATION RATE: 21 BRPM | BODY MASS INDEX: 34.07 KG/M2 | SYSTOLIC BLOOD PRESSURE: 139 MMHG | HEART RATE: 73 BPM | OXYGEN SATURATION: 97 % | DIASTOLIC BLOOD PRESSURE: 98 MMHG | HEIGHT: 69 IN | WEIGHT: 230 LBS | TEMPERATURE: 97.7 F

## 2022-06-15 DIAGNOSIS — E11.9 TYPE 2 DIABETES MELLITUS, WITHOUT LONG-TERM CURRENT USE OF INSULIN (HCC): Primary | ICD-10-CM

## 2022-06-15 DIAGNOSIS — I48.91 ATRIAL FIBRILLATION WITH RAPID VENTRICULAR RESPONSE (HCC): ICD-10-CM

## 2022-06-15 DIAGNOSIS — I50.21 ACUTE HFREF (HEART FAILURE WITH REDUCED EJECTION FRACTION) (HCC): ICD-10-CM

## 2022-06-15 DIAGNOSIS — J45.909 ASTHMA: ICD-10-CM

## 2022-06-15 LAB — SL AMB POCT HEMOGLOBIN AIC: 6.5 (ref ?–6.5)

## 2022-06-15 PROCEDURE — 83036 HEMOGLOBIN GLYCOSYLATED A1C: CPT | Performed by: FAMILY MEDICINE

## 2022-06-15 PROCEDURE — 99214 OFFICE O/P EST MOD 30 MIN: CPT | Performed by: FAMILY MEDICINE

## 2022-06-15 RX ORDER — METFORMIN HYDROCHLORIDE 500 MG/1
500 TABLET, EXTENDED RELEASE ORAL
Qty: 30 TABLET | Refills: 0 | Status: SHIPPED | OUTPATIENT
Start: 2022-06-15 | End: 2022-07-15

## 2022-06-15 RX ORDER — ALBUTEROL SULFATE 90 UG/1
2 AEROSOL, METERED RESPIRATORY (INHALATION) EVERY 4 HOURS PRN
Qty: 8 G | Refills: 0 | Status: SHIPPED | OUTPATIENT
Start: 2022-06-15

## 2022-06-15 RX ORDER — POTASSIUM CHLORIDE 20 MEQ/1
20 TABLET, EXTENDED RELEASE ORAL DAILY
Qty: 30 TABLET | Refills: 0 | Status: SHIPPED | OUTPATIENT
Start: 2022-06-15 | End: 2022-07-28 | Stop reason: SDUPTHER
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
## 2022-06-15 NOTE — PROGRESS NOTES
Outpatient Care Manager Note    Per chart review patient admitted from 5/10 to 5/18 for diagnosis of atrial fibrillation with rapid ventricular response  Patient was in a bundle episode from 2/24 to 5/25 for cardiac arrhythmia  Patient is currently receiving [facility] home nursing and social work services  Patient reports he is taking all medications as prescribed  Patient should bring all medications to next office visit for review  Patient denies shortness of breath, chest pain, dizziness  //patient reports he does obtain a daily weight  Weight today is 230 lbs  Instructed to obtain a daily weight and record  Call Cardiologist for weight gain of 3 lbs in a day and 5 lbs in a week  Patient reports eating a healthy diet to include bananas, peas, string beans, carrots and chicken  Upcoming appointment is 7/13 at 2:30 with Pillo Echevarria MD     Patient does not have any further questions, concerns, or other needs at this time  Pt has my contact # and PCP office # if needed   Pt is agreeable for further outreach

## 2022-06-15 NOTE — ASSESSMENT & PLAN NOTE
Recent hospitalization in May 2022 for AFib with RVR  Following up with Cardiology  Currently on Eliquis 5 mg b i d  And metoprolol tartrate 150 mg a m ,175 mg p m  Has been seen by Cardiology as hospital follow-up  As a follow-up appointment in 2 months as well

## 2022-06-15 NOTE — ASSESSMENT & PLAN NOTE
Wt Readings from Last 3 Encounters:   06/15/22 104 kg (230 lb)   06/07/22 104 kg (230 lb)   06/03/22 104 kg (230 lb)     Echo during hospitalization in May 2022 showed EF of 40%  Patient following up with Cardiology  Currently on metoprolol tartrate 150 mg a m , 175 mg p m   On torsemide 20 mg daily and K-Dur 20 mEq daily  Patient euvolemic on exam today  Has follow-up with Cardiology in 2 months

## 2022-06-15 NOTE — ASSESSMENT & PLAN NOTE
Lab Results   Component Value Date    HGBA1C 6 5 06/15/2022     Well controlled, currently on metformin 500 mg daily  Refill sent to the pharmacy  Discussed healthy diet and increased physical activity

## 2022-06-17 ENCOUNTER — PATIENT OUTREACH (OUTPATIENT)
Dept: FAMILY MEDICINE CLINIC | Facility: CLINIC | Age: 47
End: 2022-06-17

## 2022-06-17 NOTE — PROGRESS NOTES
MIMA OLIVEROS contacted patient's father, Sagrario Cai, regarding transportation services for patient  Patient's father reported that patient successfully utilized [employer] for appointment on 6/15  MIMA OLIVEROS followed up regarding [employer] application  Patient's father reported that he received the application for [employer] in the mail to assist his son with the application  However, he reported that patient may actually be comfortable with attempting to ride [employer]  Patient's father noted that he will follow up with patient regarding his choice for transportation for moving forward and will follow up with MIMA OLIVEROS regarding decision  Patient's father denied any further needs at this time  MIMA OLIVEROS will continue to follow and will provide psychosocial support as necessary

## 2022-06-21 ENCOUNTER — HOME CARE VISIT (OUTPATIENT)
Dept: HOME HEALTH SERVICES | Facility: HOME HEALTHCARE | Age: 47
End: 2022-06-21
Payer: MEDICARE

## 2022-06-21 VITALS
WEIGHT: 227 LBS | BODY MASS INDEX: 33.91 KG/M2 | OXYGEN SATURATION: 98 % | DIASTOLIC BLOOD PRESSURE: 82 MMHG | HEART RATE: 88 BPM | SYSTOLIC BLOOD PRESSURE: 134 MMHG | TEMPERATURE: 96.6 F | RESPIRATION RATE: 24 BRPM

## 2022-06-21 PROCEDURE — 400013 VN SOC

## 2022-06-21 PROCEDURE — G0299 HHS/HOSPICE OF RN EA 15 MIN: HCPCS

## 2022-06-22 NOTE — CASE COMMUNICATION
Provided to: Patient and dc from a nursing services on 6 20 22 1  Post Discharge Services Scheduledled  none  2  Community Resources Info Provided: YES   [employer] bus info  3  DME and or Supply Company:na  4  Medication Adherences:yes  5  Barriers To Patient and or Caregiver Adherence   pt is mentally challenged  lives [facility]   pts Father is supportive

## 2022-06-24 ENCOUNTER — PATIENT OUTREACH (OUTPATIENT)
Dept: FAMILY MEDICINE CLINIC | Facility: CLINIC | Age: 47
End: 2022-06-24

## 2022-06-24 NOTE — PROGRESS NOTES
MIMA OLIVEROS contacted patient's father, Jeramie Rizvi, to follow up regarding [address] application  Patient's reported that patient has decided to utilize the [employer]  Patient is comfortable navigating the bus system  Patient's father denies any further assistance required as they will not be completing [employer] application  MIMA OLIVEROS will close out referral at this time  MIMA OLIVEROS will continue to be available to provide psychosocial support as necessary

## 2022-06-29 ENCOUNTER — PATIENT OUTREACH (OUTPATIENT)
Dept: FAMILY MEDICINE CLINIC | Facility: CLINIC | Age: 47
End: 2022-06-29

## 2022-06-29 NOTE — PROGRESS NOTES
Outpatient Care Manager Note    Spoke with father of patient today regarding son  He states he speaks to son who states he is feeling well  Father confirmed [facility] discharged patient on 6/21  Father is unsure if patient has a scale in the home and if patient weighs himself daily  Encourage father to instruct patent to obtain a daily weight  Confirmed upcoming appointment with John Graves MD on 7/13/22  Patient does not have any further questions, concerns, or other needs at this time  Pt has my contact # and PCP office # if needed   Pt is agreeable for further outreach

## 2022-07-13 ENCOUNTER — PATIENT OUTREACH (OUTPATIENT)
Dept: FAMILY MEDICINE CLINIC | Facility: CLINIC | Age: 47
End: 2022-07-13

## 2022-07-13 ENCOUNTER — OFFICE VISIT (OUTPATIENT)
Dept: FAMILY MEDICINE CLINIC | Facility: CLINIC | Age: 47
End: 2022-07-13

## 2022-07-13 VITALS
DIASTOLIC BLOOD PRESSURE: 90 MMHG | BODY MASS INDEX: 34.89 KG/M2 | OXYGEN SATURATION: 98 % | WEIGHT: 230.2 LBS | SYSTOLIC BLOOD PRESSURE: 146 MMHG | TEMPERATURE: 97.3 F | HEART RATE: 72 BPM | RESPIRATION RATE: 18 BRPM | HEIGHT: 68 IN

## 2022-07-13 DIAGNOSIS — E11.00 TYPE 2 DIABETES MELLITUS WITH HYPEROSMOLARITY WITHOUT COMA, WITHOUT LONG-TERM CURRENT USE OF INSULIN (HCC): ICD-10-CM

## 2022-07-13 DIAGNOSIS — I50.21 ACUTE HFREF (HEART FAILURE WITH REDUCED EJECTION FRACTION) (HCC): ICD-10-CM

## 2022-07-13 DIAGNOSIS — Z11.59 ENCOUNTER FOR HEPATITIS C SCREENING TEST FOR LOW RISK PATIENT: ICD-10-CM

## 2022-07-13 DIAGNOSIS — Z11.4 ENCOUNTER FOR SCREENING FOR HIV: ICD-10-CM

## 2022-07-13 DIAGNOSIS — I10 PRIMARY HYPERTENSION: ICD-10-CM

## 2022-07-13 DIAGNOSIS — E66.01 CLASS 2 SEVERE OBESITY DUE TO EXCESS CALORIES WITH SERIOUS COMORBIDITY AND BODY MASS INDEX (BMI) OF 35.0 TO 35.9 IN ADULT (HCC): ICD-10-CM

## 2022-07-13 DIAGNOSIS — Z00.00 MEDICARE ANNUAL WELLNESS VISIT, INITIAL: Primary | ICD-10-CM

## 2022-07-13 DIAGNOSIS — E78.5 HYPERLIPIDEMIA, UNSPECIFIED HYPERLIPIDEMIA TYPE: ICD-10-CM

## 2022-07-13 DIAGNOSIS — J45.20 MILD INTERMITTENT ASTHMA WITHOUT COMPLICATION: ICD-10-CM

## 2022-07-13 DIAGNOSIS — I48.91 ATRIAL FIBRILLATION WITH RAPID VENTRICULAR RESPONSE (HCC): ICD-10-CM

## 2022-07-13 PROCEDURE — 99213 OFFICE O/P EST LOW 20 MIN: CPT | Performed by: FAMILY MEDICINE

## 2022-07-13 PROCEDURE — G0438 PPPS, INITIAL VISIT: HCPCS | Performed by: FAMILY MEDICINE

## 2022-07-13 RX ORDER — FLUOXETINE HYDROCHLORIDE 20 MG/1
CAPSULE ORAL
COMMUNITY
Start: 2022-06-21

## 2022-07-13 RX ORDER — BUSPIRONE HYDROCHLORIDE 5 MG/1
TABLET ORAL
COMMUNITY
Start: 2022-06-21

## 2022-07-13 RX ORDER — ACETAMINOPHEN 500 MG
500 TABLET ORAL EVERY 6 HOURS PRN
COMMUNITY

## 2022-07-13 RX ORDER — BUDESONIDE AND FORMOTEROL FUMARATE DIHYDRATE 160; 4.5 UG/1; UG/1
2 AEROSOL RESPIRATORY (INHALATION) 2 TIMES DAILY PRN
Qty: 10.2 G | Refills: 2 | Status: SHIPPED | OUTPATIENT
Start: 2022-07-13

## 2022-07-13 NOTE — ASSESSMENT & PLAN NOTE
Stable on home Rosuvastatin 10mg daily  - Current asympt and denies any SE's from statin therapy  - ASCVD risk unable to be calculated  - Goal LDL < 100  - C/w moderate dose statin therapy  -  in lifestyle improvement including weight loss, diet, and exercise  - Rec annual FLP to assess improvement  - Consider starting Zetia if LDL fails to improve with max statin therapy    Lab Results   Component Value Date/Time    CHOLESTEROL 89 05/14/2022 02:42 AM    TRIG 99 05/14/2022 02:42 AM    TRIG 183 03/04/2015 03:16 PM    HDL 17 (L) 05/14/2022 02:42 AM    HDL 39 03/04/2015 03:16 PM    LDLCALC 52 05/14/2022 02:42 AM    LDLCALC 199 (H) 03/04/2015 03:16 PM

## 2022-07-13 NOTE — ASSESSMENT & PLAN NOTE
BMI Counseling: Body mass index is 35 kg/m²  The BMI is above normal  Nutrition recommendations include reducing portion sizes, decreasing overall calorie intake, 3-5 servings of fruits/vegetables daily, reducing fast food intake, consuming healthier snacks, decreasing soda and/or juice intake, moderation in carbohydrate intake, increasing intake of lean protein, reducing intake of saturated fat and trans fat and reducing intake of cholesterol  Exercise recommendations include moderate aerobic physical activity for 150 minutes/week, vigorous aerobic physical activity for 75 minutes/week, exercising 3-5 times per week, joining a gym and strength training exercises    - 5/12/22 TSH 5 39, FT4 1 08 wnl  - Referral to weight management

## 2022-07-13 NOTE — ASSESSMENT & PLAN NOTE
Pt states has been using Albuterol rescue inhaler approx x4-5 days per week  - Denies any known trigger, night symptoms, or ever hospitalized for asthma exacerbation  - No prior PFT per chart check  - Exam unremarkable  Lungs clear  No respiratory distress    - Will switch to Symbicort BID per [facility] guideline  - PFT ordered  - RTC as needed

## 2022-07-13 NOTE — PROGRESS NOTES
CLINIC VISIT NOTE - [address] 56 y o  male MRN: [id number]  Encounter: [id number],  Primary Care Provider: Seth Haque MD      Date: 07/13/22    Assessments & Plans:     Problem List Items Addressed This Visit        Endocrine    Type 2 diabetes mellitus, without long-term current use of insulin ([address] )     Well controlled on home Metformin 500mg daily  - Currently asympt and compliant with treatment plan  - Home BG  - Goal HgbA1c < 7%  BGs goals: Preprandial: 80-130mg/dL and Postprandial: <180mg/dL  - Screening:  - 6/14/22 BUN 12, sCr 1 2  - 5/12/22 Microalb:Cr wnl  - 7/13/22 DM foot exam unremarkable  - IRIS -- ordered 7/13/22   - Rec lifestyle improvement weight loss, follow a diabetic diet, and decrease high carbohydrates snacks  - Counseled the patient on compliance with medications, and exercise as tolerated  - C/w current regimen for now  - Referral to clinical pharmacist for assistance with education and management  - Daily home BG check and bring log to follow-up visit  - RTC in 3 months for HgbA1c recheck  - Consider uptitrating oral antihyperglycemics in HgbA1c persistently above goal    Lab Results   Component Value Date/Time    HGBA1C 6 5 06/15/2022 02:47 PM    HGBA1C 6 7 (H) 02/25/2022 05:47 AM    HGBA1C 6 6 (H) 02/24/2022 08:15 PM    HGBA1C 6 0 12/18/2019 01:48 PM              Relevant Orders    IRIS Diabetic eye exam       Respiratory    Asthma     Pt states has been using Albuterol rescue inhaler approx x4-5 days per week  - Denies any known trigger, night symptoms, or ever hospitalized for asthma exacerbation  - No prior PFT per chart check  - Exam unremarkable  Lungs clear  No respiratory distress    - Will switch to Symbicort BID per OCHOA guideline  - PFT ordered  - RTC as needed           Relevant Medications    budesonide-formoterol (Symbicort) 160-4 5 mcg/act inhaler       Cardiovascular and Mediastinum    Hypertension     Pt reports h/o HTN but was not on any antihypertensive meds  - Pt attributes today's elevated BP to [place] bus [place]  - Currently asympt  - Does not take BP at home  - Goal BP <140/90 per JNC-8 guideline  - BUN/Cr/eGFR on fdfds6/14/22 has been stable  -  on lifestylfdse improvement including weight loss, DASH diet and exercise  - Rec daily BP check at home and bring log to next visit  - RTC in 3 months for BP recheck  - Consider starting antihypertensive if BP persistently above goal    BP Readings from Last 5 Encounters:   07/13/22 152/90   06/21/22 134/82   06/15/22 139/98   06/14/22 126/88   06/07/22 124/88              Acute HFrEF (heart failure with reduced ejection fraction) (HCC)     Symptomatically on Torsemide 20mg daily, K-Dur 20mEq daily, and Metoprolol tartrate 100mg/175mg qAM/qHS  - Recent 9-days admission (5/10-5/18/22) for CHF exacerbation likely triggered by Afib with RVR secondary to medication noncompliance  Patient was diuresed with IV Lasix and later transitioned to PO torsemide  - 5/15/22 ECHO LVEF 40% (2/25/22 ECHO LVEF 60%), likely tachyarrhythmia mediated in the setting of A fib with RVR  - Patient f/u with outpatient cardiology (last visit 5/25/22)  - Exam euvolemic  - C/w current regiment  - Follow-up appt with Cardiology (next suggested appt 7/25/22, but patient need to confirm)    Wt Readings from Last 3 Encounters:   07/13/22 104 kg (230 lb 3 2 oz)   06/21/22 103 kg (227 lb)   06/15/22 104 kg (230 lb)              Atrial fibrillation with rapid ventricular response (HCC)     Rate controlled on Metoprolol tartrate 100mg qAM, 175mg qHS per cardiology  - Recent 9-days admission (5/10-5/18/22) for CHF exacerbation likely triggered by Afib with RVR secondary to medication noncompliance  CHADVASC 2  Metoprolol was restarted  Started on UFH gtt for A/C and Diltiazem gtt for rate ctrl   However, rate was not ctrl with Diltiazem and pt was started on Digoxin, which was dc'ed after 3 days due to Hyperkalemia  Pt was deemed non-candidate for JET/cardioversion as patient is not compliant with medications and underlying cognitive disability  Eventually Metoprolol was titrated up to 150mg BID and discharged on that dose  - C/w Eliquis 5mg BID for anticoagulation  - C/w Metoprolol tartrate 100mg qAM, 175mg qHS per cardiology for rate ctrl  - Follow-up with cardiology as suggested              Other    Hyperlipidemia     Stable on home Rosuvastatin 10mg daily  - Current asympt and denies any SE's from statin therapy  - ASCVD risk unable to be calculated  - Goal LDL < 100  - C/w moderate dose statin therapy  -  in lifestyle improvement including weight loss, diet, and exercise  - Rec annual FLP to assess improvement  - Consider starting Zetia if LDL fails to improve with max statin therapy    Lab Results   Component Value Date/Time    CHOLESTEROL 89 05/14/2022 02:42 AM    TRIG 99 05/14/2022 02:42 AM    TRIG 183 03/04/2015 03:16 PM    HDL 17 (L) 05/14/2022 02:42 AM    HDL 39 03/04/2015 03:16 PM    LDLCALC 52 05/14/2022 02:42 AM    LDLCALC 199 (H) 03/04/2015 03:16 PM              Obesity     BMI Counseling: Body mass index is 35 kg/m²  The BMI is above normal  Nutrition recommendations include reducing portion sizes, decreasing overall calorie intake, 3-5 servings of fruits/vegetables daily, reducing fast food intake, consuming healthier snacks, decreasing soda and/or juice intake, moderation in carbohydrate intake, increasing intake of lean protein, reducing intake of saturated fat and trans fat and reducing intake of cholesterol  Exercise recommendations include moderate aerobic physical activity for 150 minutes/week, vigorous aerobic physical activity for 75 minutes/week, exercising 3-5 times per week, joining a gym and strength training exercises    - 5/12/22 TSH 5 39, FT4 1 08 wnl  - Referral to weight management           Relevant Orders    Ambulatory Referral to Weight Management      Other Visit Diagnoses     Medicare annual wellness visit, initial    -  Primary    Encounter for screening for HIV        Relevant Orders    HIV 1/2 Antigen/Antibody (4th Generation) w Reflex [facility]    Encounter for hepatitis C screening test for low risk patient        Relevant Orders    Hepatitis C antibody          Follow-up: Return in about 2 months (around 9/13/2022) for HgbA1c recheck  Health Maintenance Due   Topic Date Due    Hepatitis C Screening  Never done    COVID-19 Vaccine (1) Never done    Pneumococcal Vaccine: Pediatrics (0 to 5 Years) and At-Risk Patients (6 to 59 Years) (1 - PCV) Never done    Diabetic Foot Exam  Never done    DM Eye Exam  Never done    HIV Screening  Never done    Colorectal Cancer Screening  Never done    Influenza Vaccine (1) 09/01/2022         Patient Active Problem List   Diagnosis    Hypertension    Asthma    Atrial fibrillation with rapid ventricular response ([address] )    Elevated liver enzymes    Elevated d-dimer    Seroma due to trauma (HCC)    Bilateral pleural effusion    Hiatal hernia    Dyspnea on minimal exertion    Obesity    Cognitive deficits    Cardiomegaly    Diverticulosis of colon without diverticulitis    Type 2 diabetes mellitus, without long-term current use of insulin (HCC)    Hyperlipidemia    Cellulitis of left lower extremity    GLO (acute kidney injury) ([address] )    Hypokalemia    Acute HFrEF (heart failure with reduced ejection fraction) ([address] )         Recent Visits:     Recent Visits  No visits were found meeting these conditions  Showing recent visits within past 7 days and meeting all other requirements  Today's Visits  Date Type Provider Dept   07/13/22 Office Visit MD Samir Morales 58 today's visits and meeting all other requirements  Future Appointments  No visits were found meeting these conditions    Showing future appointments within next 150 days and meeting all other requirements      Subjective Subjective:     Chief Complaint   Patient presents with   [facility] Wellness Visit     Patient has no concerns, feels good today       HPI:  56 y o  male presents for initial [address] [place] [address] visit  Pt states he has been compliant with his medications and denies any current symptoms or concerns  Review of System:     Review of Systems   Constitutional: Negative for activity change, chills, fatigue and fever  HENT: Negative for congestion, rhinorrhea and sore throat  Eyes: Negative for pain and discharge  Respiratory: Negative for cough, chest tightness and shortness of breath  Cardiovascular: Negative for chest pain, palpitations and leg swelling  Gastrointestinal: Negative for abdominal pain, blood in stool, constipation, diarrhea, nausea and vomiting  Endocrine: Negative for polydipsia, polyphagia and polyuria  Genitourinary: Negative for dysuria, frequency and hematuria  Musculoskeletal: Negative for arthralgias and myalgias  Skin: Negative for rash and wound  Neurological: Negative for seizures, syncope and headaches  Psychiatric/Behavioral: Negative for behavioral problems  Historical Information     History:     Past Medical History:   Diagnosis Date    [facility]     Hypertension     Hyponatremia 5/12/2022     No past surgical history on file  Social History   Social History     Substance and Sexual Activity   Alcohol Use Never     Social History     Substance and Sexual Activity   Drug Use Never     Social History     Tobacco Use   Smoking Status Never Smoker   Smokeless Tobacco Never Used       Meds/Allergies     Medications & Allergies:      Allergies   Allergen Reactions    Fish Allergy - Food Allergy Anaphylaxis    Penicillins Other (See Comments)     unknown       PTA Medications:  Current Outpatient Medications on File Prior to Visit   Medication Sig Dispense Refill    acetaminophen (TYLENOL) 500 mg tablet Take 500 mg by mouth every 6 (six) hours as needed      albuterol (PROVENTIL HFA,VENTOLIN HFA) 90 mcg/act inhaler Inhale 2 puffs every 4 (four) hours as needed for wheezing or shortness of breath 8 g 0    apixaban (Eliquis) 5 mg Take 1 tablet (5 mg total) by mouth in the morning and 1 tablet (5 mg total) in the evening  60 tablet 1    busPIRone (BUSPAR) 5 mg tablet       FLUoxetine (PROzac) 20 mg capsule       metFORMIN (GLUCOPHAGE-XR) 500 mg 24 hr tablet Take 1 tablet (500 mg total) by mouth daily with dinner 30 tablet 0    metoprolol tartrate 75 MG TABS Take 2 tablets (150 mg total) by mouth every 12 (twelve) hours 120 tablet 1    potassium chloride (K-DUR,KLOR-CON) 20 mEq tablet Take 1 tablet (20 mEq total) by mouth daily 30 tablet 0    rosuvastatin (CRESTOR) 10 MG tablet Take 1 tablet (10 mg total) by mouth daily 30 tablet 0    torsemide (DEMADEX) 20 mg tablet Take 1 tablet (20 mg total) by mouth in the morning  30 tablet 1    metoprolol tartrate (LOPRESSOR) 25 mg tablet Metoprolol Tartrate 175mg at bedtime and 150mg in am (Patient not taking: No sig reported) 30 tablet 3    [DISCONTINUED] rivaroxaban (Xarelto) 20 mg tablet Take 1 tablet (20 mg total) by mouth daily with breakfast 30 tablet 1     No current facility-administered medications on file prior to visit  Objective     Objective & Vitals:   Vitals: /90   Pulse 72   Temp (!) 97 3 °F (36 3 °C) (Temporal)   Resp 18   Ht 5' 8" (1 727 m)   Wt 104 kg (230 lb 3 2 oz)   SpO2 98%   BMI 35 00 kg/m²       Labs, Imagings, and other studies:   I have personally reviewed pertinent reports  No results found for this or any previous visit (from the past 24 hour(s))  Physical Exam   Physical Exam  Vitals and nursing note reviewed  Constitutional:       General: He is not in acute distress  Appearance: Normal appearance  He is obese  He is not ill-appearing, toxic-appearing or diaphoretic  HENT:      Head: Normocephalic and atraumatic        Right Ear: External ear normal  Left Ear: External ear normal       Nose: Nose normal       Mouth/Throat:      Pharynx: Oropharynx is clear  Eyes:      Extraocular Movements: Extraocular movements intact  Conjunctiva/sclera: Conjunctivae normal    Cardiovascular:      Rate and Rhythm: Normal rate  Rhythm irregular  Pulmonary:      Effort: Pulmonary effort is normal    Abdominal:      General: There is no distension  Musculoskeletal:         General: Normal range of motion  Cervical back: Normal range of motion  Skin:     General: Skin is warm  Coloration: Skin is not jaundiced or pale  Neurological:      General: No focal deficit present  Mental Status: He is alert  Mental status is at baseline     Psychiatric:         Mood and Affect: Mood normal          Behavior: Behavior normal                 Health Maintenance:     Health Maintenance Due   Topic Date Due    Hepatitis C Screening  Never done    COVID-19 Vaccine (1) Never done    Pneumococcal Vaccine: Pediatrics (0 to 5 Years) and At-Risk Patients (6 to 59 Years) (1 - PCV) Never done    Diabetic Foot Exam  Never done    DM Eye Exam  Never done    HIV Screening  Never done    Colorectal Cancer Screening  Never done    Influenza Vaccine (1) 09/01/2022         Future Labs & Appointments:     FUTURE LABS:  Lab Frequency Next Occurrence   Diagnostic Sleep Study Once 04/79/1963   Basic metabolic panel Once 58/15/6202   CBC and differential Once 05/25/2022   Cardioversion Once 06/16/2022   POCT ECG Once 06/16/2022   POCT ECG Once 06/16/2022       FUTURE CONFIRMED APPOINTMENTS:  Future Appointments   Date Time Provider Shane Pham   9/13/2022  2:50 PM Ronel Hyde MD [address]       Ronel Hyde MD  PGY-2, Family Medicine  07/13/22, 4:14 PM

## 2022-07-13 NOTE — ASSESSMENT & PLAN NOTE
Pt reports h/o HTN but was not on any antihypertensive meds  - Pt attributes today's elevated BP to [place] from [place] bus [place]  - Currently asympt  - Does not take BP at home  - Goal BP <140/90 per JNC-8 guideline  - BUN/Cr/eGFR on fdfds6/14/22 has been stable  -  on lifestylfdse improvement including weight loss, DASH diet and exercise  - Rec daily BP check at home and bring log to next visit  - RTC in 3 months for BP recheck  - Consider starting antihypertensive if BP persistently above goal    BP Readings from Last 5 Encounters:   07/13/22 152/90   06/21/22 134/82   06/15/22 139/98   06/14/22 126/88   06/07/22 124/88

## 2022-07-13 NOTE — ASSESSMENT & PLAN NOTE
Well controlled on home Metformin 500mg daily  - Currently asympt and compliant with treatment plan  - Home BG  - Goal HgbA1c < 7%  BGs goals: Preprandial: 80-130mg/dL and Postprandial: <180mg/dL  - Screening:  - 6/14/22 BUN 12, sCr 1 2  - 5/12/22 Microalb:Cr wnl  - 7/13/22 DM foot exam unremarkable  - IRIS -- ordered 7/13/22   - Rec lifestyle improvement weight loss, follow a diabetic diet, and decrease high carbohydrates snacks  - Counseled the patient on compliance with medications, and exercise as tolerated    - C/w current regimen for now  - Referral to clinical pharmacist for assistance with education and management  - Daily home BG check and bring log to follow-up visit  - RTC in 3 months for HgbA1c recheck  - Consider uptitrating oral antihyperglycemics in HgbA1c persistently above goal    Lab Results   Component Value Date/Time    HGBA1C 6 5 06/15/2022 02:47 PM    HGBA1C 6 7 (H) 02/25/2022 05:47 AM    HGBA1C 6 6 (H) 02/24/2022 08:15 PM    HGBA1C 6 0 12/18/2019 01:48 PM

## 2022-07-13 NOTE — ASSESSMENT & PLAN NOTE
Rate controlled on Metoprolol tartrate 100mg qAM, 175mg qHS per cardiology  - Recent 9-days admission (5/10-5/18/22) for CHF exacerbation likely triggered by Afib with RVR secondary to medication noncompliance  CHADVASC 2  Metoprolol was restarted  Started on UFH gtt for A/C and Diltiazem gtt for rate ctrl  However, rate was not ctrl with Diltiazem and pt was started on Digoxin, which was dc'ed after 3 days due to Hyperkalemia  Pt was deemed non-candidate for JET/cardioversion as patient is not compliant with medications and underlying cognitive disability  Eventually Metoprolol was titrated up to 150mg BID and discharged on that dose    - C/w Eliquis 5mg BID for anticoagulation  - C/w Metoprolol tartrate 100mg qAM, 175mg qHS per cardiology for rate ctrl  - Follow-up with cardiology as suggested

## 2022-07-13 NOTE — ASSESSMENT & PLAN NOTE
Symptomatically on Torsemide 20mg daily, K-Dur 20mEq daily, and Metoprolol tartrate 100mg/175mg qAM/qHS  - Recent 9-days admission (5/10-5/18/22) for CHF exacerbation likely triggered by Afib with RVR secondary to medication noncompliance  Patient was diuresed with IV Lasix and later transitioned to PO torsemide  - 5/15/22 ECHO LVEF 40% (2/25/22 ECHO LVEF 60%), likely tachyarrhythmia mediated in the setting of A fib with RVR  - Patient f/u with outpatient cardiology (last visit 5/25/22)     - Exam euvolemic  - C/w current regiment  - Follow-up appt with Cardiology (next suggested appt 7/25/22, but patient need to confirm)    Wt Readings from Last 3 Encounters:   07/13/22 104 kg (230 lb 3 2 oz)   06/21/22 103 kg (227 lb)   06/15/22 104 kg (230 lb)

## 2022-07-13 NOTE — PROGRESS NOTES
Outpatient Care Manager Note    CM called and spoke to father, Analia Spann, of patient to remind him to bring his medication to his appointment for review  Father will contact patient  CM met with patient in the office today  Patient brought his medication bottles and reconciliation performed  Per review of medications, patient did not bring Buspar 5 mg, Prozac 20 mg, Metformin  mg, Crestor 10 mg  Encouraged patient to have CM assist  with obtaining bubble packs for his medications  Patient declined at this time  Patient reports eating a low sodium diet  Patient weight 230 lbs  Patient does not have any further questions, concerns, or other needs at this time  Pt has my contact # and PCP office # if needed   Pt is agreeable for further outreach

## 2022-07-13 NOTE — PROGRESS NOTES
Kierra is here for his Initial Wellness visit  Health Risk Assessment:   Patient rates overall health as very good  Patient feels that their physical health rating is slightly better  Patient is satisfied with their life  Eyesight was rated as same  Hearing was rated as same  Patient feels that their emotional and mental health rating is slightly better  Patients states they are sometimes angry  Patient states they are never, rarely unusually tired/fatigued  Pain experienced in the last 7 days has been none  Patient states that he has experienced no weight loss or gain in last 6 months  Fall Risk Screening: In the past year, patient has experienced: history of falling in past year    Number of falls: 1  Injured during fall?: Yes    Feels unsteady when standing or walking?: No    Worried about falling?: No      Home Safety:  Patient does not have trouble with stairs inside or outside of their home  Patient has working smoke alarms and has working carbon monoxide detector  Home safety hazards include: none  Nutrition:   Current diet is Regular  Medications:   Patient is currently taking over-the-counter supplements  OTC medications include: Patient occasionally takes a multivitamin    Patient is able to manage medications  Activities of Daily Living (ADLs)/Instrumental Activities of Daily Living (IADLs):   Walk and transfer into and out of bed and chair?: Yes  Dress and groom yourself?: Yes    Bathe or shower yourself?: Yes    Feed yourself?  Yes  Do your laundry/housekeeping?: Yes  Manage your money, pay your bills and track your expenses?: Yes  Make your own meals?: Yes    Do your own shopping?: Yes    Previous Hospitalizations:   Any hospitalizations or ED visits within the last 12 months?: Yes    How many hospitalizations have you had in the last year?: 1-2    Advance Care Planning:       Comments: Father is POA (lives in [address]      Cardiovascular Screening: General: Screening Not Indicated and History Lipid Disorder      Diabetes Screening:     General: Screening Not Indicated and History Diabetes      Colorectal Cancer Screening:     General: Risks and Benefits Discussed and Screening Not Indicated      Prostate Cancer Screening:    General: Screening Not Indicated and Risks and Benefits Discussed      Osteoporosis Screening:    General: Risks and Benefits Discussed and Screening Not Indicated      Abdominal Aortic Aneurysm (AAA) Screening:        General: Risks and Benefits Discussed and Screening Not Indicated      Lung Cancer Screening:     General: Screening Not Indicated and Risks and Benefits Discussed      Hepatitis C Screening:    General: Risks and Benefits Discussed    Hep C Screening Accepted: Yes      Screening, Brief Intervention, and Referral to Treatment (SBIRT)    Screening  Typical number of drinks in a day: 0  Typical number of drinks in a week: 0  Interpretation: Low risk drinking behavior  AUDIT-C Screenin) How often did you have a drink containing alcohol in the past year? never  2) How many drinks did you have on a typical day when you were drinking in the past year? 0  3) How often did you have 6 or more drinks on one occasion in the past year? never    AUDIT-C Score: 0  Interpretation: Score 0-3 (male): Negative screen for alcohol misuse    Single Item Drug Screening:  How often have you used an illegal drug (including marijuana) or a prescription medication for non-medical reasons in the past year? never    Single Item Drug Screen Score: 0  Interpretation: Negative screen for possible drug use disorder    Other Counseling Topics:   Car/seat belt/driving safety, skin self-exam, sunscreen and calcium and vitamin D intake and regular weightbearing exercise

## 2022-07-14 ENCOUNTER — TRANSCRIBE ORDERS (OUTPATIENT)
Dept: NON INVASIVE DIAGNOSTICS | Facility: HOSPITAL | Age: 47
End: 2022-07-14

## 2022-07-14 ENCOUNTER — PATIENT OUTREACH (OUTPATIENT)
Dept: FAMILY MEDICINE CLINIC | Facility: CLINIC | Age: 47
End: 2022-07-14

## 2022-07-14 NOTE — PROGRESS NOTES
Outpatient Care Manager Note    CM called and left message for Cardiology Scheduling regarding Cardioversion and ECG to be performed on 6/16/22  Order was placed by Cardiology on 5/25/22  Return call from Cardiology statiing ECG and Cardioversion were not performed  Call to patient's father to notify him above was not performed  CM will assist patient in scheduling testing and follow up appointment with Cardiology  Call to Central Scheduling to confirm patient can obtain ECG and lab testing at the same time, walk-in at any [facility] sites  Call to Cardiology and Luiza Welch MD does not have any appointments available and will call patient's father, Dilip Toribio, when the office has his September schedule  Call to patient's father to advise him Cardiology will contact him to make an appointment for patient and he can have outstanding lab testing and ECG performed at the [facility]  Father requests an confirmation e-mail be sent to [email or web address]  Patient does not have any further questions, concerns, or other needs at this time  Pt has my contact # and PCP office # if needed   Pt is agreeable for further outreach

## 2022-07-28 ENCOUNTER — PATIENT OUTREACH (OUTPATIENT)
Dept: FAMILY MEDICINE CLINIC | Facility: CLINIC | Age: 47
End: 2022-07-28

## 2022-07-28 DIAGNOSIS — I48.91 A-FIB (HCC): ICD-10-CM

## 2022-07-28 DIAGNOSIS — I50.21 ACUTE HFREF (HEART FAILURE WITH REDUCED EJECTION FRACTION) (HCC): ICD-10-CM

## 2022-07-28 RX ORDER — POTASSIUM CHLORIDE 20 MEQ/1
20 TABLET, EXTENDED RELEASE ORAL DAILY
Qty: 90 TABLET | Refills: 0 | Status: SHIPPED | OUTPATIENT
Start: 2022-07-28

## 2022-07-28 RX ORDER — TORSEMIDE 20 MG/1
20 TABLET ORAL DAILY
Qty: 90 TABLET | Refills: 0 | Status: SHIPPED | OUTPATIENT
Start: 2022-07-28

## 2022-07-28 NOTE — PROGRESS NOTES
Outpatient Care Manager Note    Spoke with patient today and he reports everything is going good  Patient medications reviewed with patient and all medications are in the home  Patient requests refills for Eliquis 5 mg, Torsemide 20 mg and Potassium Chloride  20 mEq  Patient reports he is walking daily and checking his weight which ranges from 228 lbs to 230 lbs  He reports trying to eat a diabetic, low sodium diet  He denies and shortness of breath or lower extremity edema  Confirmed with patient outstanding lab testing and ECG  Confirmed upcoming appointment with Marely Krishnamurthy MD on 9/13  Patient does not have any further questions, concerns, or other needs at this time  Pt has my contact # and PCP office # if needed   Pt is agreeable for further outreach

## 2022-08-18 ENCOUNTER — PATIENT OUTREACH (OUTPATIENT)
Dept: FAMILY MEDICINE CLINIC | Facility: CLINIC | Age: 47
End: 2022-08-18

## 2022-08-18 NOTE — PROGRESS NOTES
Outpatient Care Manager Note    Called patient's father with no answer  Left message, this is Galindo Smith the Outpatient Nurse Care Manager at [address] [place] [address] calling to follow up with the patient  The patient has laboratory testing and an ECG to be obtained    Requested return phone call at [phone]

## 2022-09-07 ENCOUNTER — PATIENT OUTREACH (OUTPATIENT)
Dept: FAMILY MEDICINE CLINIC | Facility: CLINIC | Age: 47
End: 2022-09-07

## 2022-09-07 NOTE — PROGRESS NOTES
Outpatient Care Manager Note    Placed call to patient's father Liya Duarte, for him to notify patient of upcoming appointment with Dr Denise Ledesma at [place] [address] on 9/13 at 2:50  Left my contact information for return call from patient or father  Per chart review the patient was seen by Cardiology on 5/25  Patient was instructed to follow up around 7/25 and no appointment has been scheduled to date  Patient instructed to maintain a 1500 ml fluid restriction and a 2 mg low sodium diet  Check weight daily and notify Cardiology for a weight gain of 3 lbs in one day and 5 lbs in a week  Per chart patient is to obtain Hep C antibody and HIV 1/2 Antigen/Antibody test ordered by Dr Denise Ledesma on 7/13

## 2022-09-13 ENCOUNTER — TELEPHONE (OUTPATIENT)
Dept: FAMILY MEDICINE CLINIC | Facility: CLINIC | Age: 47
End: 2022-09-13

## 2022-09-13 NOTE — TELEPHONE ENCOUNTER
Patient father left a VM for Franchesca Crowley about rescheduling appointment  The patient father stated that the patient has been feeling fine  Also that the patient does not want to make appointment until late November

## 2022-09-16 ENCOUNTER — PATIENT OUTREACH (OUTPATIENT)
Dept: FAMILY MEDICINE CLINIC | Facility: CLINIC | Age: 47
End: 2022-09-16

## 2022-09-16 NOTE — PROGRESS NOTES
Outpatient Care Manager Note    Received voicemail message from Crystal Black, father of patient  He reports patient would like to be seen in office during the month of November  He reports the patient has all medications and is taking them as prescribed  Patient denies any shortness of breath  Will continue to contact patient's father and patient for outreach

## 2022-10-25 ENCOUNTER — PATIENT OUTREACH (OUTPATIENT)
Dept: FAMILY MEDICINE CLINIC | Facility: CLINIC | Age: 47
End: 2022-10-25

## 2022-10-25 NOTE — LETTER
Date: 10/25/22    Dear Callie Rod,   My name is Kenna Hernandez; I am a registered nurse care manager working with [address] [place] [address]  [phone]  I have not been able to reach you and would like to set a time that I can talk with you over the phone or in-person  My work is to help patients that have complex medical conditions get the care they need  This includes patients who may have been in the hospital or emergency room  I have enclosed information for you  Please call me with any questions you may have  I look forward to meeting with you    Sincerely,  Kenna Hernandez  [phone]  Outpatient Care Manager

## 2022-10-25 NOTE — PROGRESS NOTES
Outpatient Care Manager Note    Called patient with no answer  Left message, this is Nisha Herrmann the Outpatient Nurse Care Manager at [facility] office calling to follow up with you  Requested return phone call at [phone]       Unable to reach letter sent

## 2022-11-01 ENCOUNTER — PATIENT OUTREACH (OUTPATIENT)
Dept: FAMILY MEDICINE CLINIC | Facility: CLINIC | Age: 47
End: 2022-11-01

## 2022-11-01 NOTE — PROGRESS NOTES
Outpatient Care Manager Note    Patient referred to Complex Care Management for patient outreach  Unable to reach patient by telephone on 9/16 and 10/25, with letter sent on 10/25/22  Will close to Complex Care Management at this time  Please re-refer as needed 
None

## 2022-11-08 DIAGNOSIS — I50.21 ACUTE HFREF (HEART FAILURE WITH REDUCED EJECTION FRACTION) (HCC): ICD-10-CM

## 2022-11-08 RX ORDER — TORSEMIDE 20 MG/1
20 TABLET ORAL DAILY
Qty: 90 TABLET | Refills: 0 | Status: SHIPPED | OUTPATIENT
Start: 2022-11-08

## 2022-11-08 RX ORDER — POTASSIUM CHLORIDE 20 MEQ/1
TABLET, EXTENDED RELEASE ORAL
Qty: 90 TABLET | Refills: 0 | Status: SHIPPED | OUTPATIENT
Start: 2022-11-08

## 2023-02-17 DIAGNOSIS — J45.909 ASTHMA: ICD-10-CM

## 2023-02-17 RX ORDER — ALBUTEROL SULFATE 90 UG/1
2 AEROSOL, METERED RESPIRATORY (INHALATION) EVERY 4 HOURS PRN
Qty: 18 G | Refills: 0 | Status: SHIPPED | OUTPATIENT
Start: 2023-02-17

## 2023-03-14 DIAGNOSIS — Z71.89 COMPLEX CARE COORDINATION: Primary | ICD-10-CM

## 2023-03-17 ENCOUNTER — PATIENT OUTREACH (OUTPATIENT)
Dept: FAMILY MEDICINE CLINIC | Facility: CLINIC | Age: 48
End: 2023-03-17

## 2023-03-17 NOTE — PROGRESS NOTES
Outpatient Care Manager Note    RN CM received referral for Chronic Care Management outreach  Per chart review, patient medical history  Of Type 2 Diabetes Mellitus, Hypertension, Heart Failure with Ejection Fraction, Atrial Fibrillation and Obesity  Father, Altagracia Cazares is listed as the medical contact for patient and listed on communication consent  Called patient's fatherAltagracia with no answer  Left message, this is Zuleima Lira the Outpatient Nurse Care Manager at [address] [place] [address] office calling to follow up with you  Requested return phone call at [phone]

## 2023-03-27 PROBLEM — R74.8 ELEVATED LIVER ENZYMES: Status: RESOLVED | Noted: 2022-02-25 | Resolved: 2023-03-27

## 2023-03-27 PROBLEM — R17 JAUNDICE: Status: ACTIVE | Noted: 2023-03-27

## 2023-04-03 ENCOUNTER — ANESTHESIA (INPATIENT)
Dept: NON INVASIVE DIAGNOSTICS | Facility: HOSPITAL | Age: 48
End: 2023-04-03

## 2023-04-03 ENCOUNTER — ANESTHESIA EVENT (INPATIENT)
Dept: NON INVASIVE DIAGNOSTICS | Facility: HOSPITAL | Age: 48
End: 2023-04-03

## 2023-04-03 PROBLEM — R21 RASH: Status: ACTIVE | Noted: 2023-04-03

## 2023-04-03 RX ORDER — SODIUM CHLORIDE 9 MG/ML
INJECTION, SOLUTION INTRAVENOUS CONTINUOUS PRN
Status: DISCONTINUED | OUTPATIENT
Start: 2023-04-03 | End: 2023-04-03

## 2023-04-03 RX ORDER — KETAMINE HCL IN NACL, ISO-OSM 100MG/10ML
SYRINGE (ML) INJECTION AS NEEDED
Status: DISCONTINUED | OUTPATIENT
Start: 2023-04-03 | End: 2023-04-03

## 2023-04-03 RX ORDER — PROPOFOL 10 MG/ML
INJECTION, EMULSION INTRAVENOUS AS NEEDED
Status: DISCONTINUED | OUTPATIENT
Start: 2023-04-03 | End: 2023-04-03

## 2023-04-03 RX ORDER — PROPOFOL 10 MG/ML
INJECTION, EMULSION INTRAVENOUS CONTINUOUS PRN
Status: DISCONTINUED | OUTPATIENT
Start: 2023-04-03 | End: 2023-04-03

## 2023-04-03 RX ADMIN — TOPICAL ANESTHETIC 1 SPRAY: 200 SPRAY DENTAL; PERIODONTAL at 11:53

## 2023-04-03 RX ADMIN — Medication 20 MG: at 12:04

## 2023-04-03 RX ADMIN — PROPOFOL 40 MCG/KG/MIN: 10 INJECTION, EMULSION INTRAVENOUS at 11:58

## 2023-04-03 RX ADMIN — PROPOFOL 50 MG: 10 INJECTION, EMULSION INTRAVENOUS at 11:58

## 2023-04-03 RX ADMIN — Medication 20 MG: at 11:58

## 2023-04-03 RX ADMIN — SODIUM CHLORIDE: 0.9 INJECTION, SOLUTION INTRAVENOUS at 12:24

## 2023-04-03 RX ADMIN — SODIUM CHLORIDE: 0.9 INJECTION, SOLUTION INTRAVENOUS at 11:49

## 2023-04-03 NOTE — ANESTHESIA POSTPROCEDURE EVALUATION
Post-Op Assessment Note    CV Status:  Stable  Pain Score: 0    Pain management: adequate     Mental Status:  Alert and sleepy   Hydration Status:  Euvolemic   PONV Controlled:  Controlled   Airway Patency:  Patent      Post Op Vitals Reviewed: Yes      Staff: CRNA         No notable events documented      BP 98/60 (04/03/23 1231)    Temp     Pulse 104 (04/03/23 1231)   Resp   18   SpO2   99

## 2023-04-03 NOTE — ANESTHESIA PREPROCEDURE EVALUATION
Procedure:  JET    Relevant Problems   CARDIO   (+) Atrial fibrillation with rapid ventricular response (HCC)   (+) Dyspnea on minimal exertion   (+) Hyperlipidemia   (+) Hypertension      ENDO   (+) Type 2 diabetes mellitus, without long-term current use of insulin (HCC)      GI/HEPATIC   (+) Hiatal hernia      /RENAL   (+) GLO (acute kidney injury) ([facility] [address] )      MUSCULOSKELETAL   (+) Hiatal hernia      PULMONARY   (+) Asthma   (+) Bilateral pleural effusion   (+) Dyspnea on minimal exertion        Physical Exam    Airway    Mallampati score: II         Dental   No notable dental hx     Cardiovascular  Cardiovascular exam normal    Pulmonary  Pulmonary exam normal     Other Findings        Anesthesia Plan  ASA Score- 4     Anesthesia Type- IV sedation with anesthesia with ASA Monitors  Additional Monitors:   Airway Plan:           Plan Factors-    Induction- intravenous  Postoperative Plan-     Informed Consent- Anesthetic plan and risks discussed with patient